# Patient Record
Sex: FEMALE | Race: WHITE | NOT HISPANIC OR LATINO | Employment: FULL TIME | URBAN - METROPOLITAN AREA
[De-identification: names, ages, dates, MRNs, and addresses within clinical notes are randomized per-mention and may not be internally consistent; named-entity substitution may affect disease eponyms.]

---

## 2020-05-27 RX ORDER — ASPIRIN 81 MG/1
81 TABLET ORAL DAILY
COMMUNITY
End: 2021-01-22 | Stop reason: HOSPADM

## 2020-05-27 RX ORDER — CARBAMAZEPINE 200 MG/1
200 TABLET, EXTENDED RELEASE ORAL DAILY
COMMUNITY
End: 2020-12-22

## 2020-05-27 RX ORDER — OMEPRAZOLE 20 MG/1
20 CAPSULE, DELAYED RELEASE ORAL 2 TIMES DAILY
COMMUNITY
End: 2021-01-22 | Stop reason: HOSPADM

## 2020-05-27 RX ORDER — ATORVASTATIN CALCIUM 40 MG/1
40 TABLET, FILM COATED ORAL DAILY
COMMUNITY

## 2020-05-27 RX ORDER — METOPROLOL TARTRATE 100 MG/1
100 TABLET ORAL 2 TIMES DAILY
COMMUNITY

## 2020-05-27 RX ORDER — ZOLPIDEM TARTRATE 5 MG/1
5 TABLET ORAL NIGHTLY PRN
COMMUNITY

## 2020-05-27 RX ORDER — CLOPIDOGREL BISULFATE 75 MG/1
75 TABLET ORAL DAILY
COMMUNITY
End: 2021-01-22 | Stop reason: HOSPADM

## 2020-05-27 RX ORDER — HYDROCODONE BITARTRATE AND ACETAMINOPHEN 7.5; 325 MG/1; MG/1
1 TABLET ORAL EVERY 6 HOURS PRN
COMMUNITY
End: 2021-01-22 | Stop reason: HOSPADM

## 2020-05-27 RX ORDER — ISOSORBIDE MONONITRATE 60 MG/1
60 TABLET, EXTENDED RELEASE ORAL DAILY
COMMUNITY
End: 2022-11-08

## 2020-05-27 RX ORDER — AMITRIPTYLINE HYDROCHLORIDE 10 MG/1
10 TABLET, FILM COATED ORAL NIGHTLY
COMMUNITY
End: 2020-06-02

## 2020-05-27 RX ORDER — FLUOXETINE HYDROCHLORIDE 40 MG/1
40 CAPSULE ORAL 2 TIMES DAILY
COMMUNITY

## 2020-05-27 RX ORDER — ROPINIROLE 2 MG/1
2 TABLET, FILM COATED ORAL NIGHTLY
COMMUNITY

## 2020-06-02 ENCOUNTER — OFFICE VISIT (OUTPATIENT)
Dept: BARIATRICS/WEIGHT MGMT | Facility: CLINIC | Age: 55
End: 2020-06-02

## 2020-06-02 VITALS
RESPIRATION RATE: 18 BRPM | DIASTOLIC BLOOD PRESSURE: 64 MMHG | WEIGHT: 220.5 LBS | TEMPERATURE: 97.8 F | OXYGEN SATURATION: 99 % | BODY MASS INDEX: 35.44 KG/M2 | HEIGHT: 66 IN | SYSTOLIC BLOOD PRESSURE: 108 MMHG | HEART RATE: 78 BPM

## 2020-06-02 DIAGNOSIS — Z87.891 FORMER SMOKER: ICD-10-CM

## 2020-06-02 DIAGNOSIS — E66.01 MORBID OBESITY (HCC): ICD-10-CM

## 2020-06-02 DIAGNOSIS — R10.13 DYSPEPSIA: ICD-10-CM

## 2020-06-02 DIAGNOSIS — K21.9 GASTROESOPHAGEAL REFLUX DISEASE, ESOPHAGITIS PRESENCE NOT SPECIFIED: ICD-10-CM

## 2020-06-02 DIAGNOSIS — E78.5 HYPERLIPIDEMIA, UNSPECIFIED HYPERLIPIDEMIA TYPE: ICD-10-CM

## 2020-06-02 DIAGNOSIS — I10 HYPERTENSION, UNSPECIFIED TYPE: Primary | ICD-10-CM

## 2020-06-02 DIAGNOSIS — E66.9 DIABETES MELLITUS TYPE 2 IN OBESE (HCC): ICD-10-CM

## 2020-06-02 DIAGNOSIS — E11.69 DIABETES MELLITUS TYPE 2 IN OBESE (HCC): ICD-10-CM

## 2020-06-02 DIAGNOSIS — R53.83 FATIGUE, UNSPECIFIED TYPE: ICD-10-CM

## 2020-06-02 PROCEDURE — 99204 OFFICE O/P NEW MOD 45 MIN: CPT | Performed by: PHYSICIAN ASSISTANT

## 2020-06-02 RX ORDER — LISINOPRIL 5 MG/1
5 TABLET ORAL DAILY
COMMUNITY

## 2020-06-02 RX ORDER — LORATADINE 10 MG/1
10 TABLET ORAL DAILY PRN
COMMUNITY

## 2020-06-02 RX ORDER — NITROGLYCERIN 0.4 MG/1
0.4 TABLET SUBLINGUAL
COMMUNITY

## 2020-06-02 RX ORDER — MONTELUKAST SODIUM 10 MG/1
10 TABLET ORAL NIGHTLY PRN
COMMUNITY

## 2020-06-02 NOTE — PROGRESS NOTES
[facility] BARIATRIC SURGERY  [address]  [address]  [facility] [id number] [phone]      Patient  Name:  Alee Wu  :  1965      Date of Visit: 2020      Chief Complaint:  weight gain; unable to maintain weight loss    History of Present Illness:  Alee Wu is a 54 y.o. female who presents today for evaluation, education and consultation regarding metabolic and bariatric surgery. The patient is interested in sleeve gastrectomy with Dr. Mcnamara.  Referred by Dr. Fernández.      Alee has been overweight for at least 30 years.  Previous diet attempts include: Low Carbohydrate, Low Fat and Cabbage Soup; Weight Watchers.  The most weight Alee lost was 13 pounds on a low fat diet but was unable to maintain that weight loss.  Her maximum lifetime weight is 237 pounds.    As above, patient has been overweight for many years, with numerous unsuccessful dietary/weight loss attempts.  She now has obesity related comorbidities and as such has decided to pursue metabolic and bariatric surgery.  All past medical, surgical, social and family history have been obtained and discussed today, as pertinent to bariatric surgery.     Past Medical History:   Diagnosis Date   • Aspirin long-term use    • CAD (coronary artery disease)     s/p stenting , on ASA + Plavix, follows w/ Dr. Walters   • Chronic back pain     on Norco daily, denies NSAIDS/steroids   • Chronic narcotic use     managed by PCP   • Depression    • Diabetes (CMS/[facility])     dx , never on insulin, A1C unknown   • Dyspepsia    • Dyspnea on exertion    • Edema    • Fatigue    • Fatty liver     follows w/ Dr. Fernández in [place]   • Former smoker     quit    • GERD (gastroesophageal reflux disease)     controlled on Omeprazole BID, recent EGD 3/2020 w/ Dr. Fernández   • Hyperlipidemia    • Hypertension    • Insomnia    • Joint pain     rheumatology eval (P)   • MI (myocardial infarction) (CMS/[facility])    • Morbid obesity  (CMS/[facility])    • Neuropathy     on Amitriptyline + Carbamazapine   • RLS (restless legs syndrome)    • Sleep apnea     CPAP compliant     Past Surgical History:   Procedure Laterality Date   •  SECTION     •  SECTION     • COLONOSCOPY     • CORONARY ANGIOPLASTY WITH STENT PLACEMENT     • CORONARY ARTERY BYPASS GRAFT     • ENDOMETRIAL ABLATION     • ENDOSCOPY      w/ Dr. Fernández   • KNEE SURGERY Right 2015   • LAPAROSCOPIC CHOLECYSTECTOMY  2016    dysfunction, no stones       No Known Allergies    Current Outpatient Medications:   •  aspirin 81 MG EC tablet, Take 81 mg by mouth Daily., Disp: , Rfl:   •  atorvastatin (LIPITOR) 40 MG tablet, Take 40 mg by mouth Daily., Disp: , Rfl:   •  carBAMazepine XR (TEGretol  XR) 200 MG 12 hr tablet, Take 200 mg by mouth Daily., Disp: , Rfl:   •  clopidogrel (PLAVIX) 75 MG tablet, Take 75 mg by mouth Daily., Disp: , Rfl:   •  FLUoxetine (PROzac) 40 MG capsule, Take 40 mg by mouth 2 (Two) Times a Day., Disp: , Rfl:   •  HYDROcodone-acetaminophen (NORCO) 7.5-325 MG per tablet, Take 1 tablet by mouth Every 6 (Six) Hours As Needed for Moderate Pain ., Disp: , Rfl:   •  isosorbide mononitrate (IMDUR) 60 MG 24 hr tablet, Take 60 mg by mouth Daily. For chest pain., Disp: , Rfl:   •  lisinopril (PRINIVIL,ZESTRIL) 5 MG tablet, Take 5 mg by mouth Daily., Disp: , Rfl:   •  loratadine (CLARITIN) 10 MG tablet, Take 10 mg by mouth Daily., Disp: , Rfl:   •  metFORMIN (GLUCOPHAGE) 1000 MG tablet, Take 1,000 mg by mouth 2 (Two) Times a Day With Meals., Disp: , Rfl:   •  metoprolol tartrate (LOPRESSOR) 100 MG tablet, Take 100 mg by mouth 2 (Two) Times a Day., Disp: , Rfl:   •  montelukast (SINGULAIR) 10 MG tablet, Take 10 mg by mouth Every Night., Disp: , Rfl:   •  omeprazole (priLOSEC) 20 MG capsule, Take 20 mg by mouth 2 (Two) Times a Day., Disp: , Rfl:   •  rOPINIRole (REQUIP) 2 MG tablet, Take 2 mg by mouth Every Night. Restless leg., Disp: , Rfl:   •   zolpidem (AMBIEN) 5 MG tablet, Take 5 mg by mouth At Night As Needed for Sleep., Disp: , Rfl:   •  nitroglycerin (NITROSTAT) 0.4 MG SL tablet, Place 0.4 mg under the tongue Every 5 (Five) Minutes As Needed for Chest Pain. Take no more than 3 doses in 15 minutes., Disp: , Rfl:     Social History     Socioeconomic History   • Marital status:      Spouse name: Not on file   • Number of children: Not on file   • Years of education: Not on file   • Highest education level: Not on file   Tobacco Use   • Smoking status: Former Smoker     Packs/day: 2.50     Years: 30.00     Pack years: 75.00     Last attempt to quit: 2018     Years since quittin.4   • Smokeless tobacco: Never Used   Substance and Sexual Activity   • Alcohol use: Never     Frequency: Never   • Drug use: Never   Social History Narrative    Lives in [place], [place].   w/ 2 children (34yo and 21yo).  Unemployed.     Family History   Problem Relation Age of Onset   • Obesity Mother    • Hypertension Mother    • Heart disease Mother    • Hypertension Father    • Heart disease Father    • Heart attack Father    • Obesity Father    • Obesity Sister    • Diabetes Sister    • Hypertension Sister    • COPD Maternal Grandmother    • Obesity Maternal Grandmother    • Hypertension Maternal Grandmother    • Stroke Maternal Grandmother    • Cirrhosis Maternal Grandfather    • Hypertension Paternal Grandmother    • Obesity Paternal Grandmother    • Cancer Paternal Grandfather        Review of Systems:  Constitutional:  reports fatigue, weight gain and denies fevers, chills.  HEENT:  denies headache, ear pain or loss of hearing, blurred or double vision, nasal discharge or sore throat.  Cardiovascular:  reports HTN, HLD, CAD, hx heart disease, hx MI and denies chest pain, palpitations, hx DVT.  Respiratory:  reports dyspnea on exertion, sleep apnea and denies cough , wheezing, asthma, hx PE.  Gastrointestinal:  reports heartburn, liver disease and  denies dysphagia, nausea, vomiting, abdominal pain, IBS.  Genitourinary:  denies history of  frequent UTI, incontinence, hematuria, dysuria, polyuria, polydipsia, renal insufficiency.    Musculoskeletal:  reports joint pain, back pain, arthritis and denies fibromyalgia and autoimmune disease.  Neurological:  denies migraines, numbness /tingling, dizziness, confusion, seizure.  Psychiatric:  denies depressed mood, feeling anxious, bipolar disorder.  Endocrine:  reports diabetes and denies thyroid disease.  Hematologic:  denies bruising, bleeding disorder, hx anemia.  Skin:  denies rashes, hx MRSA.    Physical Exam:  Vital Signs:  Weight: 100 kg (220 lb 8 oz)   Body mass index is 36.14 kg/m².  Temp: 97.8 °F (36.6 °C)   Heart Rate: 78   BP: 108/64     Physical Exam   Constitutional: She is oriented to person, place, and time. She appears well-developed and well-nourished. She is cooperative.   HENT:   Head: Normocephalic and atraumatic.   wearing a mask   Eyes: Conjunctivae are normal. No scleral icterus.   Neck: Neck supple. No thyromegaly present.   Cardiovascular: Normal rate and regular rhythm.   No murmur heard.  Pulmonary/Chest: Effort normal and breath sounds normal. No respiratory distress. She has no wheezes. She has no rales.   Abdominal: Soft. Bowel sounds are normal. She exhibits no distension and no mass. There is no tenderness. No hernia.   scars: lap jacklyn + lower transverse   Musculoskeletal: Normal range of motion. She exhibits no edema.   Neurological: She is alert and oriented to person, place, and time. Gait normal.   Skin: Skin is warm and dry. No rash noted.   Psychiatric: She has a normal mood and affect. Judgment normal.   Vitals reviewed.        Assessment:    Alee Wu is a 54 y.o. female with medically complicated obesity pursuing sleeve gastrectomy.    Patient Active Problem List   Diagnosis   • Fatigue   • CAD (coronary artery disease)   • MI (myocardial infarction) (CMS/HCC)   •  Hypertension   • Hyperlipidemia   • Edema   • Sleep apnea   • Diabetes (CMS/HCC)   • Fatty liver   • Joint pain   • RLS (restless legs syndrome)   • Former smoker   • Depression   • Aspirin long-term use   • GERD (gastroesophageal reflux disease)   • Chronic narcotic use   • Insomnia   • Neuropathy   • Chronic back pain   • Dyspepsia   • Dyspnea on exertion   • Morbid obesity (CMS/HCC)       Metabolic and bariatric surgery is deemed medically necessary given the following obesity related comorbidities including sleep apnea, diabetes, hypertension, dyslipidemia, cardiovascular disease, back pain, GERD and fatty liver with current Weight: 100 kg (220 lb 8 oz) and Body mass index is 36.14 kg/m².    Plan:  Patient understands that bariatric surgery is not cosmetic surgery but rather a tool to help make a lifelong commitment to lifestyle changes including diet, exercise, behavior modifications, and healthy habits.  The patient has been educated today on those expected postoperative lifestyle changes.  Psychological and Nutritional consultations will be arranged prior to surgery.  Instructions on how to access [employer] (an internet based site w/ educational surgical videos) were given to the patient.  Recommended vitamin supplementation was reviewed.  The importance of avoiding ASA/ NSAIDS/ steroids/ tobacco/ hormones/ immunomodulators perioperatively was discussed in detail.  All questions/concerns have been addressed.      Further evaluation will include: CBC, CMP, Lipids, TSH, HgA1C, H.Pylori Stool, EKG, CXR, Pulmonary Function Testing, Gastric Emptying Study and EGD.    Will request GI record from Dr. Fernández for review.      Additional clearances needed prior to surgery will include: Cardiology.       Further input to follow pending the above.           JANUARY Blankenship

## 2020-06-14 ENCOUNTER — APPOINTMENT (OUTPATIENT)
Dept: PREADMISSION TESTING | Facility: HOSPITAL | Age: 55
End: 2020-06-14

## 2020-06-14 PROCEDURE — U0002 COVID-19 LAB TEST NON-CDC: HCPCS

## 2020-06-14 PROCEDURE — U0004 COV-19 TEST NON-CDC HGH THRU: HCPCS

## 2020-06-14 PROCEDURE — C9803 HOPD COVID-19 SPEC COLLECT: HCPCS

## 2020-06-15 LAB
REF LAB TEST METHOD: NORMAL
SARS-COV-2 RNA RESP QL NAA+PROBE: NOT DETECTED

## 2020-06-17 ENCOUNTER — HOSPITAL ENCOUNTER (OUTPATIENT)
Dept: PULMONOLOGY | Facility: HOSPITAL | Age: 55
Discharge: HOME | End: 2020-06-17
Admitting: PHYSICIAN ASSISTANT

## 2020-06-17 DIAGNOSIS — Z87.891 FORMER SMOKER: ICD-10-CM

## 2020-06-17 PROCEDURE — 94729 DIFFUSING CAPACITY: CPT | Performed by: INTERNAL MEDICINE

## 2020-06-17 PROCEDURE — 94729 DIFFUSING CAPACITY: CPT

## 2020-06-17 PROCEDURE — 94010 BREATHING CAPACITY TEST: CPT | Performed by: INTERNAL MEDICINE

## 2020-06-17 PROCEDURE — 94727 GAS DIL/WSHOT DETER LNG VOL: CPT

## 2020-06-17 PROCEDURE — 94010 BREATHING CAPACITY TEST: CPT

## 2020-06-17 PROCEDURE — 94727 GAS DIL/WSHOT DETER LNG VOL: CPT | Performed by: INTERNAL MEDICINE

## 2020-08-02 ENCOUNTER — RESULTS ENCOUNTER (OUTPATIENT)
Dept: BARIATRICS/WEIGHT MGMT | Facility: CLINIC | Age: 55
End: 2020-08-02

## 2020-08-02 DIAGNOSIS — E78.5 HYPERLIPIDEMIA, UNSPECIFIED HYPERLIPIDEMIA TYPE: ICD-10-CM

## 2020-08-02 DIAGNOSIS — E11.69 DIABETES MELLITUS TYPE 2 IN OBESE (HCC): ICD-10-CM

## 2020-08-02 DIAGNOSIS — E66.9 DIABETES MELLITUS TYPE 2 IN OBESE (HCC): ICD-10-CM

## 2020-08-02 DIAGNOSIS — K21.9 GASTROESOPHAGEAL REFLUX DISEASE, ESOPHAGITIS PRESENCE NOT SPECIFIED: ICD-10-CM

## 2020-08-02 DIAGNOSIS — I10 HYPERTENSION, UNSPECIFIED TYPE: ICD-10-CM

## 2020-08-02 DIAGNOSIS — R10.13 DYSPEPSIA: ICD-10-CM

## 2020-08-02 DIAGNOSIS — R53.83 FATIGUE, UNSPECIFIED TYPE: ICD-10-CM

## 2020-08-10 ENCOUNTER — TELEPHONE (OUTPATIENT)
Dept: BARIATRICS/WEIGHT MGMT | Facility: CLINIC | Age: 55
End: 2020-08-10

## 2020-08-10 DIAGNOSIS — K76.0 FATTY LIVER: Primary | ICD-10-CM

## 2020-08-10 NOTE — TELEPHONE ENCOUNTER
Called and spoke to patient. Informed of new lab orders as ordered by provider. Pt voiced understanding. Stated she also wanted provider to be aware that she is still waiting on appt for cardiac clearance with [facility]. Stated they have had a covid outbreak and have been closed and keep rescheduling her appt.

## 2020-09-10 ENCOUNTER — LAB (OUTPATIENT)
Dept: LAB | Facility: HOSPITAL | Age: 55
End: 2020-09-10

## 2020-09-10 DIAGNOSIS — K76.0 FATTY LIVER: ICD-10-CM

## 2020-09-10 LAB
INR PPP: 1.14 (ref 0.85–1.16)
PROTHROMBIN TIME: 14.3 SECONDS (ref 11.5–14)

## 2020-09-10 PROCEDURE — 80053 COMPREHEN METABOLIC PANEL: CPT

## 2020-09-10 PROCEDURE — 85610 PROTHROMBIN TIME: CPT

## 2020-09-10 PROCEDURE — 36415 COLL VENOUS BLD VENIPUNCTURE: CPT

## 2020-09-11 LAB
ALBUMIN SERPL-MCNC: 4.1 G/DL (ref 3.5–5.2)
ALBUMIN/GLOB SERPL: 1 G/DL
ALP SERPL-CCNC: 133 U/L (ref 39–117)
ALT SERPL W P-5'-P-CCNC: 37 U/L (ref 1–33)
ANION GAP SERPL CALCULATED.3IONS-SCNC: 15.5 MMOL/L (ref 5–15)
AST SERPL-CCNC: 53 U/L (ref 1–32)
BILIRUB SERPL-MCNC: 0.5 MG/DL (ref 0–1.2)
BUN SERPL-MCNC: 10 MG/DL (ref 6–20)
BUN/CREAT SERPL: 13 (ref 7–25)
CALCIUM SPEC-SCNC: 9.4 MG/DL (ref 8.6–10.5)
CHLORIDE SERPL-SCNC: 100 MMOL/L (ref 98–107)
CO2 SERPL-SCNC: 20.5 MMOL/L (ref 22–29)
CREAT SERPL-MCNC: 0.77 MG/DL (ref 0.57–1)
GFR SERPL CREATININE-BSD FRML MDRD: 78 ML/MIN/1.73
GLOBULIN UR ELPH-MCNC: 4 GM/DL
GLUCOSE SERPL-MCNC: 332 MG/DL (ref 65–99)
POTASSIUM SERPL-SCNC: 3.8 MMOL/L (ref 3.5–5.2)
PROT SERPL-MCNC: 8.1 G/DL (ref 6–8.5)
SODIUM SERPL-SCNC: 136 MMOL/L (ref 136–145)

## 2020-10-02 LAB — TSH SERPL DL<=0.005 MIU/L-ACNC: 2.29 UIU/ML (ref 0.45–4.5)

## 2020-10-29 ENCOUNTER — DOCUMENTATION (OUTPATIENT)
Dept: BARIATRICS/WEIGHT MGMT | Facility: CLINIC | Age: 55
End: 2020-10-29

## 2020-10-29 NOTE — PROGRESS NOTES
"Metabolic and Bariatric Surgery  Presurgical Nutrition Assessment     Alee Wu  10/29/2020  [id number]  [id number]  1965  female    Surgery desired: Sleeve Gastrectomy   Ht 166.4 cm (65.5\")   Wt 100 kg (220 lb 8 oz)    Past Medical History:   Diagnosis Date   • Aspirin long-term use    • CAD (coronary artery disease)     s/p stenting , on ASA + Plavix, follows w/ Dr. Walters   • Chronic back pain     on Norco daily, denies NSAIDS/steroids   • Chronic narcotic use     managed by PCP   • Depression    • Diabetes (CMS/HCC)     dx , never on insulin, A1C unknown   • Dyspepsia    • Dyspnea on exertion    • Edema    • Fatigue    • Fatty liver     follows w/ Dr. Fernández in [place]   • Former smoker     quit    • GERD (gastroesophageal reflux disease)     controlled on Omeprazole BID, recent EGD 3/2020 w/ Dr. Fernández   • Hyperlipidemia    • Hypertension    • Insomnia    • Joint pain     rheumatology eval (P)   • MI (myocardial infarction) (CMS/HCC)    • Morbid obesity (CMS/HCC)    • Neuropathy     on Amitriptyline + Carbamazapine   • RLS (restless legs syndrome)    • Sleep apnea     CPAP compliant     Past Surgical History:   Procedure Laterality Date   •  SECTION     •  SECTION     • COLONOSCOPY     • CORONARY ANGIOPLASTY WITH STENT PLACEMENT     • CORONARY ARTERY BYPASS GRAFT     • ENDOMETRIAL ABLATION     • ENDOSCOPY      w/ Dr. Fernández   • KNEE SURGERY Right 2015   • LAPAROSCOPIC CHOLECYSTECTOMY  2016    dysfunction, no stones     No Known Allergies    Current Outpatient Medications:   •  aspirin 81 MG EC tablet, Take 81 mg by mouth Daily., Disp: , Rfl:   •  atorvastatin (LIPITOR) 40 MG tablet, Take 40 mg by mouth Daily., Disp: , Rfl:   •  carBAMazepine XR (TEGretol  XR) 200 MG 12 hr tablet, Take 200 mg by mouth Daily., Disp: , Rfl:   •  clopidogrel (PLAVIX) 75 MG tablet, Take 75 mg by mouth Daily., Disp: , Rfl:   •  FLUoxetine (PROzac) 40 MG capsule, Take " 40 mg by mouth 2 (Two) Times a Day., Disp: , Rfl:   •  HYDROcodone-acetaminophen (NORCO) 7.5-325 MG per tablet, Take 1 tablet by mouth Every 6 (Six) Hours As Needed for Moderate Pain ., Disp: , Rfl:   •  isosorbide mononitrate (IMDUR) 60 MG 24 hr tablet, Take 60 mg by mouth Daily. For chest pain., Disp: , Rfl:   •  lisinopril (PRINIVIL,ZESTRIL) 5 MG tablet, Take 5 mg by mouth Daily., Disp: , Rfl:   •  loratadine (CLARITIN) 10 MG tablet, Take 10 mg by mouth Daily., Disp: , Rfl:   •  metFORMIN (GLUCOPHAGE) 1000 MG tablet, Take 1,000 mg by mouth 2 (Two) Times a Day With Meals., Disp: , Rfl:   •  metoprolol tartrate (LOPRESSOR) 100 MG tablet, Take 100 mg by mouth 2 (Two) Times a Day., Disp: , Rfl:   •  montelukast (SINGULAIR) 10 MG tablet, Take 10 mg by mouth Every Night., Disp: , Rfl:   •  nitroglycerin (NITROSTAT) 0.4 MG SL tablet, Place 0.4 mg under the tongue Every 5 (Five) Minutes As Needed for Chest Pain. Take no more than 3 doses in 15 minutes., Disp: , Rfl:   •  omeprazole (priLOSEC) 20 MG capsule, Take 20 mg by mouth 2 (Two) Times a Day., Disp: , Rfl:   •  rOPINIRole (REQUIP) 2 MG tablet, Take 2 mg by mouth Every Night. Restless leg., Disp: , Rfl:   •  zolpidem (AMBIEN) 5 MG tablet, Take 5 mg by mouth At Night As Needed for Sleep., Disp: , Rfl:       Nutrition Assessment    Estimated energy needs: 1900    Estimated calories for weight loss: 1500    IBW (Pounds):  150      Excess body weight (Pounds): 70       Nutrition Recall  24 Hour recall: (B) (L) (D) -  Reviewed and discussed with patient  Breakfast:  Diet coke, chocolate covered donuts  Snack:  Caramel candy  Lunch:  Salad, pizza  Dinner:  Fish, armani slaw, corn  Diet coke, tea, coffee     Exercise  never      Education    Provided manual for Sleeve Gastrectomy and reviewed necessary vitamin and protein supplementation for surgery.     Provided follow-up options for support, including contact information for dietitians here, if desired.   Recommend that  team follow per protocol.      Nutrition Goals   Dietary Guidelines per manual  Protein goal:  grams per day   Eliminate soda    Exercise Goals  Add 15-30 minutes of activity per day as tolerated        Myriam Veliz RD  10/29/2020  12:52 EDT

## 2020-12-02 ENCOUNTER — OFFICE VISIT (OUTPATIENT)
Dept: PSYCHIATRY | Facility: CLINIC | Age: 55
End: 2020-12-02

## 2020-12-02 DIAGNOSIS — F41.1 GENERALIZED ANXIETY DISORDER: ICD-10-CM

## 2020-12-02 DIAGNOSIS — E66.9 OBESITY (BMI 30-39.9): Primary | ICD-10-CM

## 2020-12-02 DIAGNOSIS — F43.9 FEELING STRESSED OUT: ICD-10-CM

## 2020-12-02 DIAGNOSIS — Z71.89 ENCOUNTER FOR PSYCHOLOGICAL ASSESSMENT PRIOR TO BARIATRIC SURGERY: ICD-10-CM

## 2020-12-02 DIAGNOSIS — Z86.59 H/O: DEPRESSION: ICD-10-CM

## 2020-12-02 PROCEDURE — 90791 PSYCH DIAGNOSTIC EVALUATION: CPT | Performed by: PSYCHOLOGIST

## 2020-12-02 NOTE — PROGRESS NOTES
PROGRESS NOTE    Data:  Alee Wu is a 55 y.o. female who met with the undersigned for a scheduled individual outpatient therapy session from 8:12 - 8:49am.      Clinical Maneuvering/Intervention:      The pt talked about struggling with obesity for several years. Despite trying different weight loss plans and diets, the pt reported being unsuccessful in losing weight. A psychological evaluation was conducted in order to assess past and current level of functioning. Areas assessed included, but were not limited to: perception of social support, perception of ability to face and deal with challenges in life (positive functioning), anxiety symptoms, depressive symptoms, perspective on beliefs/belief system, coping skills for stress, intelligence level, addiction issues, etc. Therapeutic rapport was established. Interventions conducted today were geared towards assessing the pt's readiness for weight loss surgery and identifying and psychological contraindications for undergoing such a major life change. Social support was deemed strong (specific to weight loss surgery/weight loss in this manner and in a general sense): children, friends, physicians. Current psychological struggles were deemed low, but included anxiety. She expressed feeling more excited than nervous about weight loss surgery, but did admit to worrying about things often (primarily health). Coping skills for distress and related to undergoing a major life change such as weight loss surgery/weight loss were deemed strong and included being a very driven person, intelligent, knowing what she wants in life, and being a strong leader. She also has good insight into herself and has strong flako in God. A discussion took place, however, about areas to grow such as practicing turning more worry/control over to God and more often. She was reminded of rule she needs to follow in order to be successful with weight loss surgery such as wearing a mask at  medical visits/being with her medical team. Practicing the mindset of turning worry/control over to her medical team for weight loss surgery was conducted. The pt endorsed having characteristics of readiness to undergo major life changes inherent in the journey of weight loss surgery.        Mental Status Exam  Hygiene:  good  Dress: normal  Attitude:  cooperative and proactive  Motor Activity: normal  Speech: pressured  Mood:  tense  Affect:  congruent  Thought Processes: normal  Thought Content:  normal  Suicidal Thoughts:  not endorsed  Homicidal Thoughts:  not endorsed  Crisis Safety Plan: not needed   Hallucinations:  none      Patient's Support Network Includes:  family, friends      Progress toward goal: there is evidence to suggest that she is taking measures to improve the quality of her life including seeking weight loss surgery.      Functional Status: moderate to high      Prognosis: good    Assessment      The pt presented to be struggling with obesity, stress, and anxiety. She likely has a high energy level at baseline. The pt does have several coping skills for distress and tends to be a very driven and independent person. At the same time, and she seemed open to the feedback, it would benefit her to turn control over to others as the case may be. She seemed open to letting go of some control in life, whether to experts in medicine who treat her or just in flako, as she turns her will over to God.     From a psychological standpoint, the pt presents as a good candidate for bariatric surgery. She is motivated for the surgery, has showed readiness for the lifestyle change in terms of adjusting her eating habits, and seems to have appropriate expectations of how to prepare and how to live after surgery in order to lose weight successfully. Her speech is pressured related to anxiety and being a high energy person. She responds well with assertive statements and being interrupted often, even though others  may not handle this so well. She expressed highly disliking wearing a mask, but seemed to accept it that if she does not wear one in on hospital visits, she could be discharged from the practice.       Plan      In order to diminish symptoms of distress (anxiety and stress), prevent relapse of depression, and improve her quality of life, the pt is to follow up with her bariatric surgeon in order to receive weight loss surgery as soon as feasible/appropriate and based on success with compliance to adhering to the proper diet. She will also act on the plan discussed in detail today: purposefully turning worry and control over to God and her medical team as she goes through weight loss surgery.    Radha Long, PhD, LP

## 2020-12-15 DIAGNOSIS — R06.00 DYSPNEA, UNSPECIFIED TYPE: Primary | ICD-10-CM

## 2020-12-15 DIAGNOSIS — R53.83 FATIGUE, UNSPECIFIED TYPE: ICD-10-CM

## 2020-12-18 ENCOUNTER — HOSPITAL ENCOUNTER (OUTPATIENT)
Dept: GENERAL RADIOLOGY | Facility: HOSPITAL | Age: 55
Discharge: HOME | End: 2020-12-18

## 2020-12-18 ENCOUNTER — LAB (OUTPATIENT)
Dept: LAB | Facility: HOSPITAL | Age: 55
End: 2020-12-18

## 2020-12-18 DIAGNOSIS — R06.00 DYSPNEA, UNSPECIFIED TYPE: ICD-10-CM

## 2020-12-18 DIAGNOSIS — R53.83 FATIGUE, UNSPECIFIED TYPE: ICD-10-CM

## 2020-12-18 LAB
ALBUMIN SERPL-MCNC: 4.1 G/DL (ref 3.5–5.2)
ALBUMIN/GLOB SERPL: 1.1 G/DL
ALP SERPL-CCNC: 106 U/L (ref 39–117)
ALT SERPL W P-5'-P-CCNC: 26 U/L (ref 1–33)
ANION GAP SERPL CALCULATED.3IONS-SCNC: 11.4 MMOL/L (ref 5–15)
AST SERPL-CCNC: 36 U/L (ref 1–32)
BILIRUB SERPL-MCNC: 0.7 MG/DL (ref 0–1.2)
BUN SERPL-MCNC: 11 MG/DL (ref 6–20)
BUN/CREAT SERPL: 16.7 (ref 7–25)
CALCIUM SPEC-SCNC: 9.6 MG/DL (ref 8.6–10.5)
CHLORIDE SERPL-SCNC: 99 MMOL/L (ref 98–107)
CO2 SERPL-SCNC: 25.6 MMOL/L (ref 22–29)
CREAT SERPL-MCNC: 0.66 MG/DL (ref 0.57–1)
DEPRECATED RDW RBC AUTO: 37.4 FL (ref 37–54)
ERYTHROCYTE [DISTWIDTH] IN BLOOD BY AUTOMATED COUNT: 13.3 % (ref 12.3–15.4)
GFR SERPL CREATININE-BSD FRML MDRD: 93 ML/MIN/1.73
GLOBULIN UR ELPH-MCNC: 3.8 GM/DL
GLUCOSE SERPL-MCNC: 242 MG/DL (ref 65–99)
HCT VFR BLD AUTO: 36.8 % (ref 34–46.6)
HGB BLD-MCNC: 11.5 G/DL (ref 12–15.9)
MCH RBC QN AUTO: 24.6 PG (ref 26.6–33)
MCHC RBC AUTO-ENTMCNC: 31.3 G/DL (ref 31.5–35.7)
MCV RBC AUTO: 78.6 FL (ref 79–97)
PLATELET # BLD AUTO: 163 10*3/MM3 (ref 140–450)
PMV BLD AUTO: 9 FL (ref 6–12)
POTASSIUM SERPL-SCNC: 4.2 MMOL/L (ref 3.5–5.2)
PROT SERPL-MCNC: 7.9 G/DL (ref 6–8.5)
RBC # BLD AUTO: 4.68 10*6/MM3 (ref 3.77–5.28)
SODIUM SERPL-SCNC: 136 MMOL/L (ref 136–145)
WBC # BLD AUTO: 6.41 10*3/MM3 (ref 3.4–10.8)

## 2020-12-18 PROCEDURE — 36415 COLL VENOUS BLD VENIPUNCTURE: CPT

## 2020-12-18 PROCEDURE — 71046 X-RAY EXAM CHEST 2 VIEWS: CPT

## 2020-12-18 PROCEDURE — 85027 COMPLETE CBC AUTOMATED: CPT

## 2020-12-18 PROCEDURE — 80053 COMPREHEN METABOLIC PANEL: CPT

## 2020-12-22 ENCOUNTER — CONSULT (OUTPATIENT)
Dept: BARIATRICS/WEIGHT MGMT | Facility: CLINIC | Age: 55
End: 2020-12-22

## 2020-12-22 VITALS
SYSTOLIC BLOOD PRESSURE: 118 MMHG | OXYGEN SATURATION: 99 % | WEIGHT: 222.5 LBS | HEART RATE: 78 BPM | BODY MASS INDEX: 35.76 KG/M2 | TEMPERATURE: 97.8 F | HEIGHT: 66 IN | DIASTOLIC BLOOD PRESSURE: 76 MMHG | RESPIRATION RATE: 18 BRPM

## 2020-12-22 DIAGNOSIS — E66.01 MORBID EXOGENOUS OBESITY (HCC): Primary | ICD-10-CM

## 2020-12-22 PROCEDURE — 99214 OFFICE O/P EST MOD 30 MIN: CPT | Performed by: SURGERY

## 2020-12-22 RX ORDER — PANTOPRAZOLE SODIUM 40 MG/10ML
40 INJECTION, POWDER, LYOPHILIZED, FOR SOLUTION INTRAVENOUS ONCE
Status: CANCELLED | OUTPATIENT
Start: 2020-12-22 | End: 2020-12-22

## 2020-12-22 RX ORDER — ACETAMINOPHEN 500 MG
1000 TABLET ORAL ONCE
Status: CANCELLED | OUTPATIENT
Start: 2020-12-22 | End: 2020-12-22

## 2020-12-22 RX ORDER — SODIUM CHLORIDE 9 MG/ML
150 INJECTION, SOLUTION INTRAVENOUS CONTINUOUS
Status: CANCELLED | OUTPATIENT
Start: 2020-12-22

## 2020-12-22 RX ORDER — SODIUM CHLORIDE 0.9 % (FLUSH) 0.9 %
3-10 SYRINGE (ML) INJECTION AS NEEDED
Status: CANCELLED | OUTPATIENT
Start: 2020-12-22

## 2020-12-22 RX ORDER — SODIUM CHLORIDE 0.9 % (FLUSH) 0.9 %
3 SYRINGE (ML) INJECTION EVERY 12 HOURS SCHEDULED
Status: CANCELLED | OUTPATIENT
Start: 2020-12-22

## 2020-12-22 RX ORDER — SCOLOPAMINE TRANSDERMAL SYSTEM 1 MG/1
1 PATCH, EXTENDED RELEASE TRANSDERMAL ONCE
Status: CANCELLED | OUTPATIENT
Start: 2020-12-22 | End: 2020-12-22

## 2020-12-22 RX ORDER — GABAPENTIN 100 MG/1
600 CAPSULE ORAL ONCE
Status: CANCELLED | OUTPATIENT
Start: 2020-12-22 | End: 2020-12-22

## 2020-12-22 RX ORDER — CHLORHEXIDINE GLUCONATE 0.12 MG/ML
30 RINSE ORAL
Status: CANCELLED | OUTPATIENT
Start: 2020-12-22 | End: 2020-12-22

## 2021-01-05 ENCOUNTER — TELEPHONE (OUTPATIENT)
Dept: BARIATRICS/WEIGHT MGMT | Facility: CLINIC | Age: 56
End: 2021-01-05

## 2021-01-06 NOTE — TELEPHONE ENCOUNTER
Patient notified to stop plavix and asa 1 week before surgery, 1/13/2020. Patient verbalized understanding.

## 2021-01-17 NOTE — H&P (VIEW-ONLY)
[facility] GROUP BARIATRIC SURGERY  [address]  [address]  [facility] [address] [phone]      Patient  Name:  Alee Wu  :  1965      Date of Visit: 2020    Chief Complaint:  weight gain; unable to maintain weight loss.   Evaluate for possible metabolic and bariatric surgery    History of Present Illness:  Alee Wu is a 55 y.o. female who presents today for evaluation, education and consultation regarding metabolic and bariatric surgery (MBS).  Since last seen 2020 she has gained 2 pounds.The patient returns for final visit prior to metabolic and bariatric surgery specifically the sleeve gastrectomy.  Original intake evaluation Lorena Pacheco PA-C dated 2020 reviewed.    She noted at that time the patient's maximum lifetime weight was 237 pounds.      The patient has had issues with morbid obesity for years and only temporary success with non-surgical methods of weight loss.  The patient is seeking LSG to help with the morbid obesity related conditions of coronary artery disease with history of MI status CABG and later stenting, chronic back pain on chronic narcotics, depression, poorly controlled type II non-insulin-dependent diabetes mellitus with a hemoglobin A1c of 9.4 (2020), dyspnea exertion, peripheral edema, fatigue, NARANJO with possible cirrhosis and MELD score of 8 and splenic and peritoneal varices, severe tobacco use disorder in early remission, hyperlipidemia, hypertension, insomnia, joint pain, neuropathy, restless leg syndrome, obstructive sleep apnea with noncompliance with CPAP, microcytic anemia, degenerative spine changes on chest x-ray, hepatosplenomegaly, history gastric bezoar removal , COPD.    55-year-old morbidly obese female from [facility] but was spending a lot of time in [facility] near [place] taking care of her mother until she passed away.  She says she became upset after my talk because prior to that no one told  her about the potential contribution of high fructose corn syrup to her NARANJO.  She said she began gaining weight after she quit smoking a couple of years ago.  She says she was never told her A1c was elevated and that insulin has never been recommended.  She says she quit using her CPAP about 6 months ago because the cord broke and she felt she really did not need it.  It is my overall impression that she is not overly concerned with her own health -for example she continued to smoke heavily for years after diagnosed with a heart attack and requiring stents, noncompliance with CPAP, no interest in her diabetes management, extremely poor dietitian evaluation/eating habits, etc.  I discussed this with her and why she would even seek metabolic and bariatric surgery.  She became very tearful and acknowledged my concerns but very much wishes to proceed.  She is aware her surgery will have higher risk of morbidity and mortality given her cirrhosis and varices and nonetheless wishes to proceed.  Further increased risk with her poorly controlled diabetes, coronary artery disease, untreated obstructive sleep apnea, longstanding tobacco history, etc.  She says she is never been told prior to today that she had a heart murmur.  She is aware that if the varices are too large at the time of surgery that I would back out and not be able to safely perform her sleeve gastrectomy.        Past Medical History:   Diagnosis Date   • Aspirin long-term use    • CAD (coronary artery disease)     s/p stenting 2017, on ASA + Plavix, follows w/ Dr. Walters   • Chronic back pain     on Norco daily, denies NSAIDS/steroids   • Chronic narcotic use     managed by PCP   • COPD (chronic obstructive pulmonary disease) (CMS/[facility])    • Depression    • Diabetes (CMS/[facility])     dx 2017, never on insulin, A1C 9.4   • Dyspepsia    • Dyspnea on exertion    • Edema    • Elevated AST (SGOT)    • Fatigue    • Former smoker     quit 2018   • Gastric bezoar      History endoscopic removal Dr. Fernández 2019   • GERD (gastroesophageal reflux disease)     controlled on Omeprazole BID, recent EGD 3/2020 w/ Dr. Fernández   • Hepatosplenomegaly     By CT scan   • History of blood transfusion     With CABG    • Hyperlipidemia    • Hypertension    • Insomnia    • Joint pain     rheumatology eval (P)   • Liver cirrhosis secondary to NARANJO (CMS/HCC)     follows w/ Dr. Fernández in [place].  MELD 8   • MI (myocardial infarction) (CMS/HCC)    • Microcytic anemia    • Morbid obesity (CMS/HCC)    • Neuropathy     on Amitriptyline + Carbamazapine   • RLS (restless legs syndrome)    • Sleep apnea     CPAP noncompliant last 6 months as above   • Tobacco use disorder, severe, in early remission    • Varices of spleen     And peritoneum CT scan 2020     Past Surgical History:   Procedure Laterality Date   •  SECTION     •  SECTION     • COLONOSCOPY     • CORONARY ANGIOPLASTY WITH STENT PLACEMENT     • CORONARY ARTERY BYPASS GRAFT      2 units of packed red blood cells given   • ENDOMETRIAL ABLATION     • ENDOSCOPY      w/ Dr. Fernández   • KNEE SURGERY Right 2015   • LAPAROSCOPIC CHOLECYSTECTOMY  2016    dysfunction, no stones       No Known Allergies    Current Outpatient Medications:   •  aspirin 81 MG EC tablet, Take 81 mg by mouth Daily., Disp: , Rfl:   •  atorvastatin (LIPITOR) 40 MG tablet, Take 40 mg by mouth Daily., Disp: , Rfl:   •  clopidogrel (PLAVIX) 75 MG tablet, Take 75 mg by mouth Daily., Disp: , Rfl:   •  FLUoxetine (PROzac) 40 MG capsule, Take 40 mg by mouth 2 (Two) Times a Day., Disp: , Rfl:   •  HYDROcodone-acetaminophen (NORCO) 7.5-325 MG per tablet, Take 1 tablet by mouth Every 6 (Six) Hours As Needed for Moderate Pain ., Disp: , Rfl:   •  isosorbide mononitrate (IMDUR) 60 MG 24 hr tablet, Take 60 mg by mouth Daily. For chest pain., Disp: , Rfl:   •  lisinopril (PRINIVIL,ZESTRIL) 5 MG tablet, Take 5 mg by mouth Daily., Disp: ,  Rfl:   •  loratadine (CLARITIN) 10 MG tablet, Take 10 mg by mouth Daily., Disp: , Rfl:   •  metFORMIN (GLUCOPHAGE) 1000 MG tablet, Take 1,000 mg by mouth 2 (Two) Times a Day With Meals., Disp: , Rfl:   •  metoprolol tartrate (LOPRESSOR) 100 MG tablet, Take 100 mg by mouth 2 (Two) Times a Day., Disp: , Rfl:   •  montelukast (SINGULAIR) 10 MG tablet, Take 10 mg by mouth Every Night., Disp: , Rfl:   •  nitroglycerin (NITROSTAT) 0.4 MG SL tablet, Place 0.4 mg under the tongue Every 5 (Five) Minutes As Needed for Chest Pain. Take no more than 3 doses in 15 minutes., Disp: , Rfl:   •  omeprazole (priLOSEC) 20 MG capsule, Take 20 mg by mouth 2 (Two) Times a Day., Disp: , Rfl:   •  rOPINIRole (REQUIP) 2 MG tablet, Take 2 mg by mouth Every Night. Restless leg., Disp: , Rfl:   •  zolpidem (AMBIEN) 5 MG tablet, Take 5 mg by mouth At Night As Needed for Sleep., Disp: , Rfl:     Social History     Socioeconomic History   • Marital status:      Spouse name: Not on file   • Number of children: Not on file   • Years of education: Not on file   • Highest education level: Not on file   Tobacco Use   • Smoking status: Former Smoker     Packs/day: 2.50     Years: 30.00     Pack years: 75.00     Quit date: 2018     Years since quitting: 3.0   • Smokeless tobacco: Never Used   Substance and Sexual Activity   • Alcohol use: Never     Frequency: Never   • Drug use: Never   Social History Narrative    Lives in [place], [place].   w/ 2 children (32yo and 21yo).  Unemployed.     Family History   Problem Relation Age of Onset   • Obesity Mother    • Hypertension Mother    • Heart disease Mother    • Hypertension Father    • Heart disease Father    • Heart attack Father    • Obesity Father    • Obesity Sister    • Diabetes Sister    • Hypertension Sister    • COPD Maternal Grandmother    • Obesity Maternal Grandmother    • Hypertension Maternal Grandmother    • Stroke Maternal Grandmother    • Cirrhosis Maternal Grandfather     • Hypertension Paternal Grandmother    • Obesity Paternal Grandmother    • Cancer Paternal Grandfather        Review of Systems   Constitutional: Positive for fatigue and unexpected weight gain. Negative for chills, diaphoresis, fever and unexpected weight loss.   HENT: Negative for congestion and facial swelling.    Eyes: Negative for blurred vision, double vision and discharge.   Respiratory: Negative for chest tightness, shortness of breath and stridor.    Cardiovascular: Negative for chest pain, palpitations and leg swelling.   Gastrointestinal: Positive for GERD. Negative for blood in stool.   Endocrine: Negative for polydipsia.   Genitourinary: Negative for hematuria.   Musculoskeletal: Positive for arthralgias, back pain and myalgias.   Skin: Negative for color change.   Allergic/Immunologic: Negative for immunocompromised state.   Neurological: Negative for confusion.   Psychiatric/Behavioral: Negative for self-injury.       I have reviewed the ROS and confirm that it's accurate today.    Physical Exam:  Vital Signs:  Weight: 101 kg (222 lb 8 oz)   Body mass index is 36.46 kg/m².  Temp: 97.8 °F (36.6 °C)   Heart Rate: 78   BP: 118/76     Physical Exam  Vitals signs reviewed.   Constitutional:       Appearance: She is well-developed.   HENT:      Head: Normocephalic and atraumatic.      Nose:      Comments: mask  Eyes:      Conjunctiva/sclera: Conjunctivae normal.      Pupils: Pupils are equal, round, and reactive to light.   Neck:      Musculoskeletal: Normal range of motion and neck supple.      Thyroid: No thyromegaly.      Vascular: No carotid bruit.      Trachea: No tracheal deviation.   Cardiovascular:      Rate and Rhythm: Normal rate and regular rhythm.      Heart sounds: Murmur present. Systolic murmur present with a grade of 3/6.      Comments: Murmur heard left second intercostal space greater than right  Pulmonary:      Effort: Pulmonary effort is normal. No respiratory distress.      Breath  sounds: Normal breath sounds.   Chest:      Comments: Median sternotomy  Abdominal:      General: There is no distension.      Palpations: Abdomen is soft.      Tenderness: There is no abdominal tenderness.      Comments: Upper laparoscopy scars low transverse scar   Musculoskeletal: Normal range of motion.         General: No deformity.   Skin:     General: Skin is warm and dry.      Findings: No rash.   Neurological:      Mental Status: She is alert and oriented to person, place, and time.      Cranial Nerves: No cranial nerve deficit.      Coordination: Coordination normal.   Psychiatric:         Behavior: Behavior normal.         Thought Content: Thought content normal.         Judgment: Judgment normal.         Patient Active Problem List   Diagnosis   • Fatigue   • CAD (coronary artery disease)   • MI (myocardial infarction) (CMS/HCC)   • Hypertension   • Hyperlipidemia   • Edema   • Sleep apnea   • Diabetes (CMS/HCC)   • Fatty liver   • Joint pain   • RLS (restless legs syndrome)   • Former smoker   • Depression   • Aspirin long-term use   • GERD (gastroesophageal reflux disease)   • Chronic narcotic use   • Insomnia   • Neuropathy   • Chronic back pain   • Dyspepsia   • Dyspnea on exertion   • Morbid obesity (CMS/HCC)   • Morbid exogenous obesity (CMS/HCC)       Assessment:    Alee Wu is a 55 y.o. year old female with medically complicated obesity.    Metabolic and bariatric surgery is deemed medically necessary given the following obesity related comorbidities including coronary artery disease with history of MI status CABG and later stenting, chronic back pain on chronic narcotics, depression, poorly controlled type II non-insulin-dependent diabetes mellitus with a hemoglobin A1c of 9.4, dyspnea exertion, peripheral edema, fatigue, NARANJO with possible cirrhosis and MELD score of 8 and splenic and peritoneal varices, severe tobacco use disorder in early remission, hyperlipidemia, hypertension, insomnia,  joint pain, neuropathy, restless leg syndrome, obstructive sleep apnea with noncompliance with CPAP, microcytic anemia, degenerative spine changes on chest x-ray, hepatosplenomegaly, hx gastric bezoar removal 7/19, COPD with current Weight: 101 kg (222 lb 8 oz) and Body mass index is 36.46 kg/m²..    Encounter Diagnosis   Name Primary?   • Morbid exogenous obesity (CMS/HCC) Yes      Patient is aware that surgery is a tool, and that weight loss and improvement in comorbidities is not guaranteed but only seen in the context of appropriate use, follow up and physical activity.    The patient was present for an approximately a 2.5 hour discussion of the purpose of MBS, how MBS is a tool to assist in achieving weight loss goals, the most common complications and how best to avoid them, and the strategies for short and long term weight loss and improvement in comorbidities.  Ample opportunity to discuss questions was available both in group and during the time of individual examination.    I reviewed her Dane report which is negative despite being on chronic narcotics.  Pulmonary function test dated 6/18/2020 read by Aldair Middleton DO unremarkable.  Labs dated 12/18/2020 showing microcytic anemia with an H&H of 11.5 and 36.8 with microcytic indices normal white count.  Platelet count 163.  CMP normal except for glucose of 242 AST of 36.  3 months ago CMP showed a glucose of 332 CO2 of 20.5 ALT 37 AST 53 alkaline phosphatase 133.  Chest x-ray dated 12/18/2020 showing no active process.  Median sternotomy and CABG.  Degenerative spine changes.  Psychosocial evaluation dated 12/2/2020 Radha Long, PhD good candidate.  Dietitian evaluation dated 10/29/2020 Myriam Veliz RD.  She noted that day the patient had a diet Coke and chocolate covered doughnuts for breakfast, caramel candy snack, pizza for lunch.  EGD dated 3/2/2020  Be Fernández MD showing prominent gastritis and duodenitis, no significant hiatal hernia, no  "varices mentioned.  Biopsies the antrum showed mild chronic gastritis with mild reactive gastropathy duodenal biopsies showed mild chronic duodenitis.  No distal esophageal biopsies obtained.  Preoperative diagnosis was listed as \"abdominal\".  Postop diagnosis listed is gastritis.  Endoscopy Dr. Shirley dated 7/24/2019 where he removed a large gastric bezoar.  At that time he noted normal esophageal mucosa no hiatal hernia Z-line 39 cm mild antral gastritis.  Normal gastric emptying study dated 1/20/2020.  CT scan dated 1/30/2020 showing an enlarged fatty liver, splenomegaly, splenic and peritoneal varices with the peritoneal varices mostly on the left side no ascites.  Negative H. pylori stool antigen dated 6/23/2020.  Pulmonary function test dated 6/17/2020 showing an FVC of 86 FEV1 of 86 DLCO of 80 cardiology clearance dated 9/21/2020 signature illegible.  Cardiology clearance letter dated 9/11/2020 Horacio Miller MD. he noted she has a history of coronary artery bypass grafting surgery, hypertension, COPD and hypercholesterolemia and had a cardiac catheterization showing 2 out of 3 grafts were patent and that the SVG to the OM was occluded.  Also noted was occlusion of the distal LAD being managed medically.  Ejection fraction 60%.  He cleared her at low risk.  Nuclear medicine stress test dated 7/7/2020 showing moderate area of reversible lateral and apical ischemia ejection fraction 63%.  Echocardiogram dated 3/9/2020 showing no abnormalities ejection fraction 65% no valvular abnormalities.  Labs dated 9/10/2020 showing an elevated pro time at 14.3 with an INR normal at 1.14 normal TSH.  Labs dated 9/2/2020 showing an H&H of 11.4 and 35.2 low platelet count 122.  Labs dated 6/23/2020 showing a low platelet count 124 hemoglobin A1c 9.4.  Lipid panel showing triglycerides 315 cholesterol 237  VLDL 63 HDL 36 all abnormal.  Please see scanned records that I have reviewed and signed off on today.  All of " "this in addition to the patient's unique history and exam has been taken into consideration in determining their appropriate candidacy for MBS.    Complications  of laparoscopic/possible robotic gastric sleeve were discussed. The patient is well aware of the potential complications of surgery that include but not limited to bleeding, infections, deep venous thrombosis, pulmonary embolism, pulmonary complications such as pneumonia, cardiac events, hernias, small bowel obstruction, damage to the spleen or other organs, bowel injury, disfiguring scars, failure to lose weight, need for additional surgery, conversion to an open procedure, and death. Patient is also aware of complications which apply in this particular procedure that can include but are not limited to a \"leak\" at the staple line which in some instances may require conversion to gastric bypass.    The patient is aware if a hiatal hernia is encountered, it likely will be repaired.  R/B/A Rx to hiatal hernia repair were discussed as outlined in our long consent form.  Briefly risks in addition to those for LSG include recurrent hernia, MELODY, dysphagia, esophageal injury, pneumothorax, injury to the vagus nerves, injury to the thoracic duct, aorta or vena cava.    I discussed avoiding all tobacco products and second hand smoke at least 2 weeks pre-operatively and 6 weeks post-operatively to minimize the risk of sleeve leak.  This included discussing the importance of avoiding even secondhand smoke as the risk of leak is increased.  Examples discussed:  I made it very clear that the patient understands they should avoid even riding in a car where someone has previously smoked in the last 2 weeks, living in a house where someone smokes (even if it's in a separate room/patio/attached garage, etc.) we discussed that they should not have a conversation with a group of people who are smoking even if it's outside.  They can be around wood burning fires and barbecue.  " I told them I do not know if marijuana has a same effects but my overall recommendation is to avoid it for 2 weeks prior in 6 weeks after surgery.  They also are aware that nicotine may also increase the risk of leak and I strongly encouraged him to avoid that as well for 2 weeks prior in 6 weeks after surgery.    Discussed the risks, benefits and alternative therapies at great length as outlined in our extensive consent forms, consent videos, and educational teaching process under the direction of the center's .    A copy of the patient's signed informed consent is on file.    R/B/A Rx discussed to postop anticoagulation incl but not limited to bleeding, drug reaction, venothromboembolic events, etc. and the patient will continue on her Plavix and hold her aspirin 1 week prior in 1 week  (not 2 or the preferred 6 weeks) after surgery although she understands that this could increase her risk of delayed leak.      I explained that my usual practice with cardiac stent patients has been to hold the ASA and/or plavix 1 wk prior and instead of the 6 wks afterwards (safest to hopefully avoid a leak), resume at 2 wks post op with PPI coverage.  So far, this has not resulted in any leaks to date, but the sample size is relatively small.  The idea is to balance the risk of stent occlusion with the risk of leak and there's no perfect solution and there will be an increased risk of delayed leak.        Plan: After evaluation today I think the patient is a much higher but not prohibitive risk to proceed with laparoscopic sleeve gastrectomy and EGD.  Once again if her varices are too large I will abort as it cannot be performed safely.  Type and screen.  Increased risk of delayed leak on aspirin as above.  Other issues include coronary artery disease with history of MI status CABG and later stenting with occluded saphenous vein graft to her obtuse marginal and distal occlusion of her LAD, chronic back pain on  chronic narcotics, depression, poorly controlled type II non-insulin-dependent diabetes mellitus with a hemoglobin A1c of 9.4, dyspnea exertion, peripheral edema, fatigue, NARANJO cirrhosis and MELD score of 8 and splenic and peritoneal varices, severe tobacco use disorder in early remission, hyperlipidemia, hypertension, insomnia, joint pain, neuropathy, restless leg syndrome, obstructive sleep apnea with noncompliance with CPAP, microcytic anemia with increase risk for perioperative transfusion, degenerative spine changes on chest x-ray, hepatosplenomegaly, hx gastric bezoar removal 7/19, COPD, poor dietitian evaluation, heart murmur, intermittent thrombocytopenia.      Raymundo Mcnamara MD              Answers for HPI/ROS submitted by the patient on 12/16/2020   What is the primary reason for your visit?: Other  Please describe your symptoms.: Consultation for gastric sleeve surgery  Have you had these symptoms before?: No  How long have you been having these symptoms?: Greater than 2 weeks  Please list any medications you are currently taking for this condition.: See attached  Please describe any probable cause for these symptoms. : See attached

## 2021-01-17 NOTE — PROGRESS NOTES
[facility] GROUP BARIATRIC SURGERY  [address]  [address]  [facility] [id number] [phone]      Patient  Name:  Alee Wu  :  1965      Date of Visit: 2020    Chief Complaint:  weight gain; unable to maintain weight loss.   Evaluate for possible metabolic and bariatric surgery    History of Present Illness:  Alee Wu is a 55 y.o. female who presents today for evaluation, education and consultation regarding metabolic and bariatric surgery (MBS).  Since last seen 2020 she has gained 2 pounds.The patient returns for final visit prior to metabolic and bariatric surgery specifically the sleeve gastrectomy.  Original intake evaluation Lorena Pacheco PA-C dated 2020 reviewed.    She noted at that time the patient's maximum lifetime weight was 237 pounds.      The patient has had issues with morbid obesity for years and only temporary success with non-surgical methods of weight loss.  The patient is seeking LSG to help with the morbid obesity related conditions of coronary artery disease with history of MI status CABG and later stenting, chronic back pain on chronic narcotics, depression, poorly controlled type II non-insulin-dependent diabetes mellitus with a hemoglobin A1c of 9.4 (2020), dyspnea exertion, peripheral edema, fatigue, NARANJO with possible cirrhosis and MELD score of 8 and splenic and peritoneal varices, severe tobacco use disorder in early remission, hyperlipidemia, hypertension, insomnia, joint pain, neuropathy, restless leg syndrome, obstructive sleep apnea with noncompliance with CPAP, microcytic anemia, degenerative spine changes on chest x-ray, hepatosplenomegaly, history gastric bezoar removal , COPD.    55-year-old morbidly obese female from [facility] but was spending a lot of time in [facility] near [place] taking care of her mother until she passed away.  She says she became upset after my talk because prior to that no one told  her about the potential contribution of high fructose corn syrup to her NARANJO.  She said she began gaining weight after she quit smoking a couple of years ago.  She says she was never told her A1c was elevated and that insulin has never been recommended.  She says she quit using her CPAP about 6 months ago because the cord broke and she felt she really did not need it.  It is my overall impression that she is not overly concerned with her own health -for example she continued to smoke heavily for years after diagnosed with a heart attack and requiring stents, noncompliance with CPAP, no interest in her diabetes management, extremely poor dietitian evaluation/eating habits, etc.  I discussed this with her and why she would even seek metabolic and bariatric surgery.  She became very tearful and acknowledged my concerns but very much wishes to proceed.  She is aware her surgery will have higher risk of morbidity and mortality given her cirrhosis and varices and nonetheless wishes to proceed.  Further increased risk with her poorly controlled diabetes, coronary artery disease, untreated obstructive sleep apnea, longstanding tobacco history, etc.  She says she is never been told prior to today that she had a heart murmur.  She is aware that if the varices are too large at the time of surgery that I would back out and not be able to safely perform her sleeve gastrectomy.        Past Medical History:   Diagnosis Date   • Aspirin long-term use    • CAD (coronary artery disease)     s/p stenting 2017, on ASA + Plavix, follows w/ Dr. Walters   • Chronic back pain     on Norco daily, denies NSAIDS/steroids   • Chronic narcotic use     managed by PCP   • COPD (chronic obstructive pulmonary disease) (CMS/[facility])    • Depression    • Diabetes (CMS/[facility])     dx 2017, never on insulin, A1C 9.4   • Dyspepsia    • Dyspnea on exertion    • Edema    • Elevated AST (SGOT)    • Fatigue    • Former smoker     quit 2018   • Gastric bezoar      History endoscopic removal Dr. Fernández 2019   • GERD (gastroesophageal reflux disease)     controlled on Omeprazole BID, recent EGD 3/2020 w/ Dr. Fernández   • Hepatosplenomegaly     By CT scan   • History of blood transfusion     With CABG    • Hyperlipidemia    • Hypertension    • Insomnia    • Joint pain     rheumatology eval (P)   • Liver cirrhosis secondary to NARANJO (CMS/HCC)     follows w/ Dr. Fernández in [place].  MELD 8   • MI (myocardial infarction) (CMS/HCC)    • Microcytic anemia    • Morbid obesity (CMS/HCC)    • Neuropathy     on Amitriptyline + Carbamazapine   • RLS (restless legs syndrome)    • Sleep apnea     CPAP noncompliant last 6 months as above   • Tobacco use disorder, severe, in early remission    • Varices of spleen     And peritoneum CT scan 2020     Past Surgical History:   Procedure Laterality Date   •  SECTION     •  SECTION     • COLONOSCOPY     • CORONARY ANGIOPLASTY WITH STENT PLACEMENT     • CORONARY ARTERY BYPASS GRAFT      2 units of packed red blood cells given   • ENDOMETRIAL ABLATION     • ENDOSCOPY      w/ Dr. Fernández   • KNEE SURGERY Right 2015   • LAPAROSCOPIC CHOLECYSTECTOMY  2016    dysfunction, no stones       No Known Allergies    Current Outpatient Medications:   •  aspirin 81 MG EC tablet, Take 81 mg by mouth Daily., Disp: , Rfl:   •  atorvastatin (LIPITOR) 40 MG tablet, Take 40 mg by mouth Daily., Disp: , Rfl:   •  clopidogrel (PLAVIX) 75 MG tablet, Take 75 mg by mouth Daily., Disp: , Rfl:   •  FLUoxetine (PROzac) 40 MG capsule, Take 40 mg by mouth 2 (Two) Times a Day., Disp: , Rfl:   •  HYDROcodone-acetaminophen (NORCO) 7.5-325 MG per tablet, Take 1 tablet by mouth Every 6 (Six) Hours As Needed for Moderate Pain ., Disp: , Rfl:   •  isosorbide mononitrate (IMDUR) 60 MG 24 hr tablet, Take 60 mg by mouth Daily. For chest pain., Disp: , Rfl:   •  lisinopril (PRINIVIL,ZESTRIL) 5 MG tablet, Take 5 mg by mouth Daily., Disp: ,  Rfl:   •  loratadine (CLARITIN) 10 MG tablet, Take 10 mg by mouth Daily., Disp: , Rfl:   •  metFORMIN (GLUCOPHAGE) 1000 MG tablet, Take 1,000 mg by mouth 2 (Two) Times a Day With Meals., Disp: , Rfl:   •  metoprolol tartrate (LOPRESSOR) 100 MG tablet, Take 100 mg by mouth 2 (Two) Times a Day., Disp: , Rfl:   •  montelukast (SINGULAIR) 10 MG tablet, Take 10 mg by mouth Every Night., Disp: , Rfl:   •  nitroglycerin (NITROSTAT) 0.4 MG SL tablet, Place 0.4 mg under the tongue Every 5 (Five) Minutes As Needed for Chest Pain. Take no more than 3 doses in 15 minutes., Disp: , Rfl:   •  omeprazole (priLOSEC) 20 MG capsule, Take 20 mg by mouth 2 (Two) Times a Day., Disp: , Rfl:   •  rOPINIRole (REQUIP) 2 MG tablet, Take 2 mg by mouth Every Night. Restless leg., Disp: , Rfl:   •  zolpidem (AMBIEN) 5 MG tablet, Take 5 mg by mouth At Night As Needed for Sleep., Disp: , Rfl:     Social History     Socioeconomic History   • Marital status:      Spouse name: Not on file   • Number of children: Not on file   • Years of education: Not on file   • Highest education level: Not on file   Tobacco Use   • Smoking status: Former Smoker     Packs/day: 2.50     Years: 30.00     Pack years: 75.00     Quit date: 2018     Years since quitting: 3.0   • Smokeless tobacco: Never Used   Substance and Sexual Activity   • Alcohol use: Never     Frequency: Never   • Drug use: Never   Social History Narrative    Lives in [place], [place].   w/ 2 children (32yo and 21yo).  Unemployed.     Family History   Problem Relation Age of Onset   • Obesity Mother    • Hypertension Mother    • Heart disease Mother    • Hypertension Father    • Heart disease Father    • Heart attack Father    • Obesity Father    • Obesity Sister    • Diabetes Sister    • Hypertension Sister    • COPD Maternal Grandmother    • Obesity Maternal Grandmother    • Hypertension Maternal Grandmother    • Stroke Maternal Grandmother    • Cirrhosis Maternal Grandfather     • Hypertension Paternal Grandmother    • Obesity Paternal Grandmother    • Cancer Paternal Grandfather        Review of Systems   Constitutional: Positive for fatigue and unexpected weight gain. Negative for chills, diaphoresis, fever and unexpected weight loss.   HENT: Negative for congestion and facial swelling.    Eyes: Negative for blurred vision, double vision and discharge.   Respiratory: Negative for chest tightness, shortness of breath and stridor.    Cardiovascular: Negative for chest pain, palpitations and leg swelling.   Gastrointestinal: Positive for GERD. Negative for blood in stool.   Endocrine: Negative for polydipsia.   Genitourinary: Negative for hematuria.   Musculoskeletal: Positive for arthralgias, back pain and myalgias.   Skin: Negative for color change.   Allergic/Immunologic: Negative for immunocompromised state.   Neurological: Negative for confusion.   Psychiatric/Behavioral: Negative for self-injury.       I have reviewed the ROS and confirm that it's accurate today.    Physical Exam:  Vital Signs:  Weight: 101 kg (222 lb 8 oz)   Body mass index is 36.46 kg/m².  Temp: 97.8 °F (36.6 °C)   Heart Rate: 78   BP: 118/76     Physical Exam  Vitals signs reviewed.   Constitutional:       Appearance: She is well-developed.   HENT:      Head: Normocephalic and atraumatic.      Nose:      Comments: mask  Eyes:      Conjunctiva/sclera: Conjunctivae normal.      Pupils: Pupils are equal, round, and reactive to light.   Neck:      Musculoskeletal: Normal range of motion and neck supple.      Thyroid: No thyromegaly.      Vascular: No carotid bruit.      Trachea: No tracheal deviation.   Cardiovascular:      Rate and Rhythm: Normal rate and regular rhythm.      Heart sounds: Murmur present. Systolic murmur present with a grade of 3/6.      Comments: Murmur heard left second intercostal space greater than right  Pulmonary:      Effort: Pulmonary effort is normal. No respiratory distress.      Breath  sounds: Normal breath sounds.   Chest:      Comments: Median sternotomy  Abdominal:      General: There is no distension.      Palpations: Abdomen is soft.      Tenderness: There is no abdominal tenderness.      Comments: Upper laparoscopy scars low transverse scar   Musculoskeletal: Normal range of motion.         General: No deformity.   Skin:     General: Skin is warm and dry.      Findings: No rash.   Neurological:      Mental Status: She is alert and oriented to person, place, and time.      Cranial Nerves: No cranial nerve deficit.      Coordination: Coordination normal.   Psychiatric:         Behavior: Behavior normal.         Thought Content: Thought content normal.         Judgment: Judgment normal.         Patient Active Problem List   Diagnosis   • Fatigue   • CAD (coronary artery disease)   • MI (myocardial infarction) (CMS/HCC)   • Hypertension   • Hyperlipidemia   • Edema   • Sleep apnea   • Diabetes (CMS/HCC)   • Fatty liver   • Joint pain   • RLS (restless legs syndrome)   • Former smoker   • Depression   • Aspirin long-term use   • GERD (gastroesophageal reflux disease)   • Chronic narcotic use   • Insomnia   • Neuropathy   • Chronic back pain   • Dyspepsia   • Dyspnea on exertion   • Morbid obesity (CMS/HCC)   • Morbid exogenous obesity (CMS/HCC)       Assessment:    Alee Wu is a 55 y.o. year old female with medically complicated obesity.    Metabolic and bariatric surgery is deemed medically necessary given the following obesity related comorbidities including coronary artery disease with history of MI status CABG and later stenting, chronic back pain on chronic narcotics, depression, poorly controlled type II non-insulin-dependent diabetes mellitus with a hemoglobin A1c of 9.4, dyspnea exertion, peripheral edema, fatigue, NARANJO with possible cirrhosis and MELD score of 8 and splenic and peritoneal varices, severe tobacco use disorder in early remission, hyperlipidemia, hypertension, insomnia,  joint pain, neuropathy, restless leg syndrome, obstructive sleep apnea with noncompliance with CPAP, microcytic anemia, degenerative spine changes on chest x-ray, hepatosplenomegaly, hx gastric bezoar removal 7/19, COPD with current Weight: 101 kg (222 lb 8 oz) and Body mass index is 36.46 kg/m²..    Encounter Diagnosis   Name Primary?   • Morbid exogenous obesity (CMS/HCC) Yes      Patient is aware that surgery is a tool, and that weight loss and improvement in comorbidities is not guaranteed but only seen in the context of appropriate use, follow up and physical activity.    The patient was present for an approximately a 2.5 hour discussion of the purpose of MBS, how MBS is a tool to assist in achieving weight loss goals, the most common complications and how best to avoid them, and the strategies for short and long term weight loss and improvement in comorbidities.  Ample opportunity to discuss questions was available both in group and during the time of individual examination.    I reviewed her Dane report which is negative despite being on chronic narcotics.  Pulmonary function test dated 6/18/2020 read by Aldair Middleton DO unremarkable.  Labs dated 12/18/2020 showing microcytic anemia with an H&H of 11.5 and 36.8 with microcytic indices normal white count.  Platelet count 163.  CMP normal except for glucose of 242 AST of 36.  3 months ago CMP showed a glucose of 332 CO2 of 20.5 ALT 37 AST 53 alkaline phosphatase 133.  Chest x-ray dated 12/18/2020 showing no active process.  Median sternotomy and CABG.  Degenerative spine changes.  Psychosocial evaluation dated 12/2/2020 Radha Long, PhD good candidate.  Dietitian evaluation dated 10/29/2020 Myriam Veliz RD.  She noted that day the patient had a diet Coke and chocolate covered doughnuts for breakfast, caramel candy snack, pizza for lunch.  EGD dated 3/2/2020  Be Fernández MD showing prominent gastritis and duodenitis, no significant hiatal hernia, no  "varices mentioned.  Biopsies the antrum showed mild chronic gastritis with mild reactive gastropathy duodenal biopsies showed mild chronic duodenitis.  No distal esophageal biopsies obtained.  Preoperative diagnosis was listed as \"abdominal\".  Postop diagnosis listed is gastritis.  Endoscopy Dr. Shirley dated 7/24/2019 where he removed a large gastric bezoar.  At that time he noted normal esophageal mucosa no hiatal hernia Z-line 39 cm mild antral gastritis.  Normal gastric emptying study dated 1/20/2020.  CT scan dated 1/30/2020 showing an enlarged fatty liver, splenomegaly, splenic and peritoneal varices with the peritoneal varices mostly on the left side no ascites.  Negative H. pylori stool antigen dated 6/23/2020.  Pulmonary function test dated 6/17/2020 showing an FVC of 86 FEV1 of 86 DLCO of 80 cardiology clearance dated 9/21/2020 signature illegible.  Cardiology clearance letter dated 9/11/2020 Horacio Miller MD. he noted she has a history of coronary artery bypass grafting surgery, hypertension, COPD and hypercholesterolemia and had a cardiac catheterization showing 2 out of 3 grafts were patent and that the SVG to the OM was occluded.  Also noted was occlusion of the distal LAD being managed medically.  Ejection fraction 60%.  He cleared her at low risk.  Nuclear medicine stress test dated 7/7/2020 showing moderate area of reversible lateral and apical ischemia ejection fraction 63%.  Echocardiogram dated 3/9/2020 showing no abnormalities ejection fraction 65% no valvular abnormalities.  Labs dated 9/10/2020 showing an elevated pro time at 14.3 with an INR normal at 1.14 normal TSH.  Labs dated 9/2/2020 showing an H&H of 11.4 and 35.2 low platelet count 122.  Labs dated 6/23/2020 showing a low platelet count 124 hemoglobin A1c 9.4.  Lipid panel showing triglycerides 315 cholesterol 237  VLDL 63 HDL 36 all abnormal.  Please see scanned records that I have reviewed and signed off on today.  All of " "this in addition to the patient's unique history and exam has been taken into consideration in determining their appropriate candidacy for MBS.    Complications  of laparoscopic/possible robotic gastric sleeve were discussed. The patient is well aware of the potential complications of surgery that include but not limited to bleeding, infections, deep venous thrombosis, pulmonary embolism, pulmonary complications such as pneumonia, cardiac events, hernias, small bowel obstruction, damage to the spleen or other organs, bowel injury, disfiguring scars, failure to lose weight, need for additional surgery, conversion to an open procedure, and death. Patient is also aware of complications which apply in this particular procedure that can include but are not limited to a \"leak\" at the staple line which in some instances may require conversion to gastric bypass.    The patient is aware if a hiatal hernia is encountered, it likely will be repaired.  R/B/A Rx to hiatal hernia repair were discussed as outlined in our long consent form.  Briefly risks in addition to those for LSG include recurrent hernia, MELODY, dysphagia, esophageal injury, pneumothorax, injury to the vagus nerves, injury to the thoracic duct, aorta or vena cava.    I discussed avoiding all tobacco products and second hand smoke at least 2 weeks pre-operatively and 6 weeks post-operatively to minimize the risk of sleeve leak.  This included discussing the importance of avoiding even secondhand smoke as the risk of leak is increased.  Examples discussed:  I made it very clear that the patient understands they should avoid even riding in a car where someone has previously smoked in the last 2 weeks, living in a house where someone smokes (even if it's in a separate room/patio/attached garage, etc.) we discussed that they should not have a conversation with a group of people who are smoking even if it's outside.  They can be around wood burning fires and barbecue.  " I told them I do not know if marijuana has a same effects but my overall recommendation is to avoid it for 2 weeks prior in 6 weeks after surgery.  They also are aware that nicotine may also increase the risk of leak and I strongly encouraged him to avoid that as well for 2 weeks prior in 6 weeks after surgery.    Discussed the risks, benefits and alternative therapies at great length as outlined in our extensive consent forms, consent videos, and educational teaching process under the direction of the center's .    A copy of the patient's signed informed consent is on file.    R/B/A Rx discussed to postop anticoagulation incl but not limited to bleeding, drug reaction, venothromboembolic events, etc. and the patient will continue on her Plavix and hold her aspirin 1 week prior in 1 week  (not 2 or the preferred 6 weeks) after surgery although she understands that this could increase her risk of delayed leak.      I explained that my usual practice with cardiac stent patients has been to hold the ASA and/or plavix 1 wk prior and instead of the 6 wks afterwards (safest to hopefully avoid a leak), resume at 2 wks post op with PPI coverage.  So far, this has not resulted in any leaks to date, but the sample size is relatively small.  The idea is to balance the risk of stent occlusion with the risk of leak and there's no perfect solution and there will be an increased risk of delayed leak.        Plan: After evaluation today I think the patient is a much higher but not prohibitive risk to proceed with laparoscopic sleeve gastrectomy and EGD.  Once again if her varices are too large I will abort as it cannot be performed safely.  Type and screen.  Increased risk of delayed leak on aspirin as above.  Other issues include coronary artery disease with history of MI status CABG and later stenting with occluded saphenous vein graft to her obtuse marginal and distal occlusion of her LAD, chronic back pain on  chronic narcotics, depression, poorly controlled type II non-insulin-dependent diabetes mellitus with a hemoglobin A1c of 9.4, dyspnea exertion, peripheral edema, fatigue, NARANJO cirrhosis and MELD score of 8 and splenic and peritoneal varices, severe tobacco use disorder in early remission, hyperlipidemia, hypertension, insomnia, joint pain, neuropathy, restless leg syndrome, obstructive sleep apnea with noncompliance with CPAP, microcytic anemia with increase risk for perioperative transfusion, degenerative spine changes on chest x-ray, hepatosplenomegaly, hx gastric bezoar removal 7/19, COPD, poor dietitian evaluation, heart murmur, intermittent thrombocytopenia.      Raymundo Mcnamara MD              Answers for HPI/ROS submitted by the patient on 12/16/2020   What is the primary reason for your visit?: Other  Please describe your symptoms.: Consultation for gastric sleeve surgery  Have you had these symptoms before?: No  How long have you been having these symptoms?: Greater than 2 weeks  Please list any medications you are currently taking for this condition.: See attached  Please describe any probable cause for these symptoms. : See attached

## 2021-01-18 ENCOUNTER — APPOINTMENT (OUTPATIENT)
Dept: PREADMISSION TESTING | Facility: HOSPITAL | Age: 56
End: 2021-01-18

## 2021-01-18 DIAGNOSIS — E66.01 MORBID EXOGENOUS OBESITY (HCC): ICD-10-CM

## 2021-01-18 LAB
ABO GROUP BLD: NORMAL
BLD GP AB SCN SERPL QL: NEGATIVE
DEPRECATED RDW RBC AUTO: 43.9 FL (ref 37–54)
ERYTHROCYTE [DISTWIDTH] IN BLOOD BY AUTOMATED COUNT: 14.9 % (ref 12.3–15.4)
HBA1C MFR BLD: 8.6 % (ref 4.8–5.6)
HCT VFR BLD AUTO: 37.3 % (ref 34–46.6)
HGB BLD-MCNC: 11.5 G/DL (ref 12–15.9)
MCH RBC QN AUTO: 25.4 PG (ref 26.6–33)
MCHC RBC AUTO-ENTMCNC: 30.8 G/DL (ref 31.5–35.7)
MCV RBC AUTO: 82.5 FL (ref 79–97)
PLATELET # BLD AUTO: 148 10*3/MM3 (ref 140–450)
PMV BLD AUTO: 8.8 FL (ref 6–12)
POTASSIUM SERPL-SCNC: 4.1 MMOL/L (ref 3.5–5.2)
RBC # BLD AUTO: 4.52 10*6/MM3 (ref 3.77–5.28)
RH BLD: POSITIVE
T&S EXPIRATION DATE: NORMAL
WBC # BLD AUTO: 7.2 10*3/MM3 (ref 3.4–10.8)

## 2021-01-18 PROCEDURE — 86900 BLOOD TYPING SEROLOGIC ABO: CPT

## 2021-01-18 PROCEDURE — 85027 COMPLETE CBC AUTOMATED: CPT

## 2021-01-18 PROCEDURE — 86901 BLOOD TYPING SEROLOGIC RH(D): CPT

## 2021-01-18 PROCEDURE — 83036 HEMOGLOBIN GLYCOSYLATED A1C: CPT

## 2021-01-18 PROCEDURE — 86850 RBC ANTIBODY SCREEN: CPT

## 2021-01-18 PROCEDURE — 36415 COLL VENOUS BLD VENIPUNCTURE: CPT

## 2021-01-18 PROCEDURE — C9803 HOPD COVID-19 SPEC COLLECT: HCPCS

## 2021-01-18 PROCEDURE — 93005 ELECTROCARDIOGRAM TRACING: CPT

## 2021-01-18 PROCEDURE — 93010 ELECTROCARDIOGRAM REPORT: CPT | Performed by: INTERNAL MEDICINE

## 2021-01-18 PROCEDURE — 84132 ASSAY OF SERUM POTASSIUM: CPT

## 2021-01-18 PROCEDURE — U0004 COV-19 TEST NON-CDC HGH THRU: HCPCS

## 2021-01-18 RX ORDER — GLIPIZIDE 5 MG/1
5 TABLET ORAL
COMMUNITY
End: 2021-01-22 | Stop reason: HOSPADM

## 2021-01-18 NOTE — PAT
Patient to apply Chlorhexadine wipes  to surgical area (as instructed) the night before procedure and the AM of procedure. Wipes provided.    Patient instructed to drink 20 ounces (or until full) of Gatorade and it needs to be completed 1 hour before given arrival time for procedure (NO RED Gatorade)    Patient verbalized understanding.    Blood bank bracelet applied to patient during Pre Admission Testing visit.  Patient instructed not to remove from arm until after procedure and they are discharged from the hospital.  Explained to patient that they may shower and get the bracelet wet, but not to immerse under water for longer periods (bathing, swimming, hand dishwashing, etc).  Patient verbalized understanding.    Per Anesthesia Request, patient instructed not to take their ACE/ARB medications on the AM of surgery.

## 2021-01-19 ENCOUNTER — ANESTHESIA EVENT (OUTPATIENT)
Dept: PERIOP | Facility: HOSPITAL | Age: 56
End: 2021-01-19

## 2021-01-19 LAB
QT INTERVAL: 452 MS
QTC INTERVAL: 436 MS
SARS-COV-2 RNA RESP QL NAA+PROBE: NOT DETECTED

## 2021-01-19 RX ORDER — FAMOTIDINE 10 MG/ML
20 INJECTION, SOLUTION INTRAVENOUS ONCE
Status: CANCELLED | OUTPATIENT
Start: 2021-01-19 | End: 2021-01-19

## 2021-01-19 RX ORDER — SODIUM CHLORIDE 0.9 % (FLUSH) 0.9 %
10 SYRINGE (ML) INJECTION EVERY 12 HOURS SCHEDULED
Status: CANCELLED | OUTPATIENT
Start: 2021-01-19

## 2021-01-19 RX ORDER — FAMOTIDINE 20 MG/1
20 TABLET, FILM COATED ORAL ONCE
Status: CANCELLED | OUTPATIENT
Start: 2021-01-19 | End: 2021-01-19

## 2021-01-20 ENCOUNTER — HOSPITAL ENCOUNTER (INPATIENT)
Facility: HOSPITAL | Age: 56
LOS: 2 days | Discharge: HOME | End: 2021-01-22
Attending: SURGERY | Admitting: SURGERY

## 2021-01-20 ENCOUNTER — ANESTHESIA (OUTPATIENT)
Dept: PERIOP | Facility: HOSPITAL | Age: 56
End: 2021-01-20

## 2021-01-20 DIAGNOSIS — E66.01 MORBID EXOGENOUS OBESITY (HCC): ICD-10-CM

## 2021-01-20 LAB
ABO GROUP BLD: NORMAL
B-HCG UR QL: NEGATIVE
GLUCOSE BLDC GLUCOMTR-MCNC: 109 MG/DL (ref 70–130)
GLUCOSE BLDC GLUCOMTR-MCNC: 167 MG/DL (ref 70–130)
GLUCOSE BLDC GLUCOMTR-MCNC: 185 MG/DL (ref 70–130)
GLUCOSE BLDC GLUCOMTR-MCNC: 192 MG/DL (ref 70–130)
INTERNAL NEGATIVE CONTROL: NEGATIVE
INTERNAL POSITIVE CONTROL: POSITIVE
Lab: NORMAL
RH BLD: POSITIVE

## 2021-01-20 PROCEDURE — 81025 URINE PREGNANCY TEST: CPT | Performed by: SURGERY

## 2021-01-20 PROCEDURE — 47001 NDL BIOPSY LVR TM OTH MAJ PX: CPT | Performed by: SURGERY

## 2021-01-20 PROCEDURE — 0DJ08ZZ INSPECTION OF UPPER INTESTINAL TRACT, VIA NATURAL OR ARTIFICIAL OPENING ENDOSCOPIC: ICD-10-PCS | Performed by: SURGERY

## 2021-01-20 PROCEDURE — 88307 TISSUE EXAM BY PATHOLOGIST: CPT | Performed by: SURGERY

## 2021-01-20 PROCEDURE — 25010000002 DEXAMETHASONE SODIUM PHOSPHATE 10 MG/ML SOLUTION: Performed by: ANESTHESIOLOGY

## 2021-01-20 PROCEDURE — 63710000001 PROMETHAZINE PER 12.5 MG: Performed by: SURGERY

## 2021-01-20 PROCEDURE — 25010000002 CEFAZOLIN PER 500 MG: Performed by: SURGERY

## 2021-01-20 PROCEDURE — 82962 GLUCOSE BLOOD TEST: CPT

## 2021-01-20 PROCEDURE — 43775 LAP SLEEVE GASTRECTOMY: CPT | Performed by: SURGERY

## 2021-01-20 PROCEDURE — 86901 BLOOD TYPING SEROLOGIC RH(D): CPT

## 2021-01-20 PROCEDURE — 86900 BLOOD TYPING SEROLOGIC ABO: CPT

## 2021-01-20 PROCEDURE — 25010000002 FENTANYL CITRATE (PF) 100 MCG/2ML SOLUTION: Performed by: NURSE ANESTHETIST, CERTIFIED REGISTERED

## 2021-01-20 PROCEDURE — 88313 SPECIAL STAINS GROUP 2: CPT | Performed by: SURGERY

## 2021-01-20 PROCEDURE — 25010000002 ENOXAPARIN PER 10 MG: Performed by: SURGERY

## 2021-01-20 PROCEDURE — 63710000001 INSULIN LISPRO (HUMAN) PER 5 UNITS: Performed by: SURGERY

## 2021-01-20 PROCEDURE — 25010000003 CEFAZOLIN IN DEXTROSE 2-4 GM/100ML-% SOLUTION: Performed by: SURGERY

## 2021-01-20 PROCEDURE — 25010000002 PROPOFOL 10 MG/ML EMULSION: Performed by: NURSE ANESTHETIST, CERTIFIED REGISTERED

## 2021-01-20 PROCEDURE — 25010000002 ONDANSETRON PER 1 MG: Performed by: SURGERY

## 2021-01-20 PROCEDURE — 0FB04ZX EXCISION OF LIVER, PERCUTANEOUS ENDOSCOPIC APPROACH, DIAGNOSTIC: ICD-10-PCS | Performed by: SURGERY

## 2021-01-20 PROCEDURE — 25010000002 HYDROMORPHONE PER 4 MG: Performed by: NURSE ANESTHETIST, CERTIFIED REGISTERED

## 2021-01-20 PROCEDURE — 25010000002 HYDROMORPHONE 1 MG/ML SOLUTION: Performed by: SURGERY

## 2021-01-20 PROCEDURE — 25010000002 NEOSTIGMINE 10 MG/10ML SOLUTION: Performed by: NURSE ANESTHETIST, CERTIFIED REGISTERED

## 2021-01-20 PROCEDURE — 0DB64Z3 EXCISION OF STOMACH, PERCUTANEOUS ENDOSCOPIC APPROACH, VERTICAL: ICD-10-PCS | Performed by: SURGERY

## 2021-01-20 DEVICE — REINFORCED INTELLIGENT RELOAD, FOR USE WITH SIGNIA STAPLING SYSTEM
Type: IMPLANTABLE DEVICE | Site: ABDOMEN | Status: FUNCTIONAL
Brand: TRI-STAPLE 2.0

## 2021-01-20 DEVICE — BLACK REINFORCED INTELLIGENT RELOAD, FOR USE WITH SIGNIA STAPLING SYSTEM
Type: IMPLANTABLE DEVICE | Site: ABDOMEN | Status: FUNCTIONAL
Brand: TRI-STAPLE 2.0

## 2021-01-20 DEVICE — SEALANT WND FIBRIN TISSEEL PREFIL/SYR/PRIMAFZ 10ML: Type: IMPLANTABLE DEVICE | Site: ABDOMEN | Status: FUNCTIONAL

## 2021-01-20 RX ORDER — MAGNESIUM HYDROXIDE 1200 MG/15ML
LIQUID ORAL AS NEEDED
Status: DISCONTINUED | OUTPATIENT
Start: 2021-01-20 | End: 2021-01-20 | Stop reason: HOSPADM

## 2021-01-20 RX ORDER — ALPRAZOLAM 0.25 MG/1
0.25 TABLET ORAL ONCE AS NEEDED
Status: DISCONTINUED | OUTPATIENT
Start: 2021-01-20 | End: 2021-01-22 | Stop reason: HOSPADM

## 2021-01-20 RX ORDER — SODIUM CHLORIDE 9 MG/ML
150 INJECTION, SOLUTION INTRAVENOUS CONTINUOUS
Status: DISCONTINUED | OUTPATIENT
Start: 2021-01-20 | End: 2021-01-20 | Stop reason: HOSPADM

## 2021-01-20 RX ORDER — CEFAZOLIN SODIUM 2 G/100ML
2 INJECTION, SOLUTION INTRAVENOUS EVERY 8 HOURS
Status: COMPLETED | OUTPATIENT
Start: 2021-01-20 | End: 2021-01-21

## 2021-01-20 RX ORDER — ONDANSETRON 2 MG/ML
4 INJECTION INTRAMUSCULAR; INTRAVENOUS ONCE AS NEEDED
Status: DISCONTINUED | OUTPATIENT
Start: 2021-01-20 | End: 2021-01-20 | Stop reason: HOSPADM

## 2021-01-20 RX ORDER — SIMETHICONE 80 MG
80 TABLET,CHEWABLE ORAL 4 TIMES DAILY PRN
Status: DISCONTINUED | OUTPATIENT
Start: 2021-01-20 | End: 2021-01-22 | Stop reason: HOSPADM

## 2021-01-20 RX ORDER — CHLORHEXIDINE GLUCONATE 0.12 MG/ML
30 RINSE ORAL
Status: DISCONTINUED | OUTPATIENT
Start: 2021-01-20 | End: 2021-01-20

## 2021-01-20 RX ORDER — DIPHENHYDRAMINE HYDROCHLORIDE 50 MG/ML
25 INJECTION INTRAMUSCULAR; INTRAVENOUS EVERY 4 HOURS PRN
Status: DISCONTINUED | OUTPATIENT
Start: 2021-01-20 | End: 2021-01-22 | Stop reason: HOSPADM

## 2021-01-20 RX ORDER — MONTELUKAST SODIUM 10 MG/1
10 TABLET ORAL NIGHTLY PRN
Status: DISCONTINUED | OUTPATIENT
Start: 2021-01-20 | End: 2021-01-22 | Stop reason: HOSPADM

## 2021-01-20 RX ORDER — ALBUTEROL SULFATE 2.5 MG/3ML
2.5 SOLUTION RESPIRATORY (INHALATION)
Status: DISCONTINUED | OUTPATIENT
Start: 2021-01-20 | End: 2021-01-22 | Stop reason: HOSPADM

## 2021-01-20 RX ORDER — SODIUM CHLORIDE, SODIUM LACTATE, POTASSIUM CHLORIDE, CALCIUM CHLORIDE 600; 310; 30; 20 MG/100ML; MG/100ML; MG/100ML; MG/100ML
9 INJECTION, SOLUTION INTRAVENOUS CONTINUOUS
Status: DISCONTINUED | OUTPATIENT
Start: 2021-01-20 | End: 2021-01-20 | Stop reason: HOSPADM

## 2021-01-20 RX ORDER — NITROGLYCERIN 0.4 MG/1
0.4 TABLET SUBLINGUAL
Status: DISCONTINUED | OUTPATIENT
Start: 2021-01-20 | End: 2021-01-22 | Stop reason: HOSPADM

## 2021-01-20 RX ORDER — SODIUM CHLORIDE 0.9 % (FLUSH) 0.9 %
10 SYRINGE (ML) INJECTION AS NEEDED
Status: DISCONTINUED | OUTPATIENT
Start: 2021-01-20 | End: 2021-01-20 | Stop reason: HOSPADM

## 2021-01-20 RX ORDER — HYDRALAZINE HYDROCHLORIDE 20 MG/ML
10 INJECTION INTRAMUSCULAR; INTRAVENOUS
Status: DISCONTINUED | OUTPATIENT
Start: 2021-01-20 | End: 2021-01-22 | Stop reason: HOSPADM

## 2021-01-20 RX ORDER — ISOSORBIDE MONONITRATE 60 MG/1
60 TABLET, EXTENDED RELEASE ORAL DAILY
Status: DISCONTINUED | OUTPATIENT
Start: 2021-01-20 | End: 2021-01-22 | Stop reason: HOSPADM

## 2021-01-20 RX ORDER — METOCLOPRAMIDE HYDROCHLORIDE 5 MG/ML
10 INJECTION INTRAMUSCULAR; INTRAVENOUS EVERY 6 HOURS PRN
Status: DISCONTINUED | OUTPATIENT
Start: 2021-01-20 | End: 2021-01-22 | Stop reason: HOSPADM

## 2021-01-20 RX ORDER — SCOLOPAMINE TRANSDERMAL SYSTEM 1 MG/1
1 PATCH, EXTENDED RELEASE TRANSDERMAL ONCE
Status: DISCONTINUED | OUTPATIENT
Start: 2021-01-20 | End: 2021-01-20

## 2021-01-20 RX ORDER — PANTOPRAZOLE SODIUM 40 MG/10ML
40 INJECTION, POWDER, LYOPHILIZED, FOR SOLUTION INTRAVENOUS ONCE
Status: COMPLETED | OUTPATIENT
Start: 2021-01-20 | End: 2021-01-20

## 2021-01-20 RX ORDER — FIBRINOGEN HUMAN AND THROMBIN HUMAN 10 ML
KIT TOPICAL AS NEEDED
Status: DISCONTINUED | OUTPATIENT
Start: 2021-01-20 | End: 2021-01-20 | Stop reason: HOSPADM

## 2021-01-20 RX ORDER — CEFAZOLIN SODIUM 2 G/100ML
2 INJECTION, SOLUTION INTRAVENOUS ONCE
Status: COMPLETED | OUTPATIENT
Start: 2021-01-20 | End: 2021-01-20

## 2021-01-20 RX ORDER — DEXAMETHASONE SODIUM PHOSPHATE 10 MG/ML
INJECTION, SOLUTION INTRAMUSCULAR; INTRAVENOUS
Status: COMPLETED | OUTPATIENT
Start: 2021-01-20 | End: 2021-01-20

## 2021-01-20 RX ORDER — LABETALOL HYDROCHLORIDE 5 MG/ML
5 INJECTION, SOLUTION INTRAVENOUS
Status: DISCONTINUED | OUTPATIENT
Start: 2021-01-20 | End: 2021-01-20 | Stop reason: HOSPADM

## 2021-01-20 RX ORDER — LIDOCAINE HYDROCHLORIDE 10 MG/ML
INJECTION, SOLUTION EPIDURAL; INFILTRATION; INTRACAUDAL; PERINEURAL AS NEEDED
Status: DISCONTINUED | OUTPATIENT
Start: 2021-01-20 | End: 2021-01-20 | Stop reason: SURG

## 2021-01-20 RX ORDER — NEOSTIGMINE METHYLSULFATE 1 MG/ML
INJECTION, SOLUTION INTRAVENOUS AS NEEDED
Status: DISCONTINUED | OUTPATIENT
Start: 2021-01-20 | End: 2021-01-20 | Stop reason: SURG

## 2021-01-20 RX ORDER — GABAPENTIN 100 MG/1
100 CAPSULE ORAL 3 TIMES DAILY
Status: COMPLETED | OUTPATIENT
Start: 2021-01-20 | End: 2021-01-22

## 2021-01-20 RX ORDER — PANTOPRAZOLE SODIUM 40 MG/10ML
40 INJECTION, POWDER, LYOPHILIZED, FOR SOLUTION INTRAVENOUS
Status: DISCONTINUED | OUTPATIENT
Start: 2021-01-21 | End: 2021-01-22 | Stop reason: HOSPADM

## 2021-01-20 RX ORDER — METOPROLOL TARTRATE 100 MG/1
100 TABLET ORAL EVERY 12 HOURS SCHEDULED
Status: DISCONTINUED | OUTPATIENT
Start: 2021-01-20 | End: 2021-01-22 | Stop reason: HOSPADM

## 2021-01-20 RX ORDER — SODIUM CHLORIDE 9 MG/ML
100 INJECTION, SOLUTION INTRAVENOUS CONTINUOUS
Status: DISCONTINUED | OUTPATIENT
Start: 2021-01-20 | End: 2021-01-22 | Stop reason: HOSPADM

## 2021-01-20 RX ORDER — PROMETHAZINE HYDROCHLORIDE 25 MG/1
25 TABLET ORAL ONCE AS NEEDED
Status: DISCONTINUED | OUTPATIENT
Start: 2021-01-20 | End: 2021-01-20 | Stop reason: HOSPADM

## 2021-01-20 RX ORDER — NALOXONE HCL 0.4 MG/ML
0.4 VIAL (ML) INJECTION
Status: DISCONTINUED | OUTPATIENT
Start: 2021-01-20 | End: 2021-01-22 | Stop reason: HOSPADM

## 2021-01-20 RX ORDER — FLUOXETINE HYDROCHLORIDE 20 MG/1
40 CAPSULE ORAL 2 TIMES DAILY
Status: DISCONTINUED | OUTPATIENT
Start: 2021-01-20 | End: 2021-01-22 | Stop reason: HOSPADM

## 2021-01-20 RX ORDER — GABAPENTIN 300 MG/1
600 CAPSULE ORAL ONCE
Status: COMPLETED | OUTPATIENT
Start: 2021-01-20 | End: 2021-01-20

## 2021-01-20 RX ORDER — FENTANYL CITRATE 50 UG/ML
INJECTION, SOLUTION INTRAMUSCULAR; INTRAVENOUS AS NEEDED
Status: DISCONTINUED | OUTPATIENT
Start: 2021-01-20 | End: 2021-01-20 | Stop reason: SURG

## 2021-01-20 RX ORDER — PROCHLORPERAZINE MALEATE 10 MG
10 TABLET ORAL EVERY 6 HOURS PRN
Status: DISCONTINUED | OUTPATIENT
Start: 2021-01-20 | End: 2021-01-22 | Stop reason: HOSPADM

## 2021-01-20 RX ORDER — ACETAMINOPHEN 500 MG
1000 TABLET ORAL ONCE
Status: COMPLETED | OUTPATIENT
Start: 2021-01-20 | End: 2021-01-20

## 2021-01-20 RX ORDER — ONDANSETRON 4 MG/1
4 TABLET, FILM COATED ORAL EVERY 6 HOURS PRN
Status: DISCONTINUED | OUTPATIENT
Start: 2021-01-21 | End: 2021-01-22 | Stop reason: HOSPADM

## 2021-01-20 RX ORDER — GLYCOPYRROLATE 0.2 MG/ML
INJECTION INTRAMUSCULAR; INTRAVENOUS AS NEEDED
Status: DISCONTINUED | OUTPATIENT
Start: 2021-01-20 | End: 2021-01-20 | Stop reason: SURG

## 2021-01-20 RX ORDER — ROPINIROLE 2 MG/1
2 TABLET, FILM COATED ORAL NIGHTLY
Status: DISCONTINUED | OUTPATIENT
Start: 2021-01-20 | End: 2021-01-22 | Stop reason: HOSPADM

## 2021-01-20 RX ORDER — HYDROMORPHONE HYDROCHLORIDE 2 MG/1
2 TABLET ORAL EVERY 4 HOURS PRN
Status: DISCONTINUED | OUTPATIENT
Start: 2021-01-20 | End: 2021-01-22 | Stop reason: HOSPADM

## 2021-01-20 RX ORDER — HYDROMORPHONE HYDROCHLORIDE 1 MG/ML
0.5 INJECTION, SOLUTION INTRAMUSCULAR; INTRAVENOUS; SUBCUTANEOUS
Status: DISCONTINUED | OUTPATIENT
Start: 2021-01-20 | End: 2021-01-20 | Stop reason: HOSPADM

## 2021-01-20 RX ORDER — FENTANYL CITRATE 50 UG/ML
50 INJECTION, SOLUTION INTRAMUSCULAR; INTRAVENOUS
Status: DISCONTINUED | OUTPATIENT
Start: 2021-01-20 | End: 2021-01-20 | Stop reason: HOSPADM

## 2021-01-20 RX ORDER — LORAZEPAM 2 MG/ML
0.5 INJECTION INTRAMUSCULAR EVERY 12 HOURS PRN
Status: DISCONTINUED | OUTPATIENT
Start: 2021-01-20 | End: 2021-01-22 | Stop reason: HOSPADM

## 2021-01-20 RX ORDER — CETIRIZINE HYDROCHLORIDE 10 MG/1
10 TABLET ORAL DAILY
Status: DISCONTINUED | OUTPATIENT
Start: 2021-01-20 | End: 2021-01-22 | Stop reason: HOSPADM

## 2021-01-20 RX ORDER — MORPHINE SULFATE 4 MG/ML
4 INJECTION, SOLUTION INTRAMUSCULAR; INTRAVENOUS
Status: DISCONTINUED | OUTPATIENT
Start: 2021-01-20 | End: 2021-01-22 | Stop reason: HOSPADM

## 2021-01-20 RX ORDER — BUPIVACAINE HYDROCHLORIDE 2.5 MG/ML
INJECTION, SOLUTION EPIDURAL; INFILTRATION; INTRACAUDAL
Status: COMPLETED | OUTPATIENT
Start: 2021-01-20 | End: 2021-01-20

## 2021-01-20 RX ORDER — IPRATROPIUM BROMIDE AND ALBUTEROL SULFATE 2.5; .5 MG/3ML; MG/3ML
3 SOLUTION RESPIRATORY (INHALATION) ONCE AS NEEDED
Status: DISCONTINUED | OUTPATIENT
Start: 2021-01-20 | End: 2021-01-20 | Stop reason: HOSPADM

## 2021-01-20 RX ORDER — SODIUM CHLORIDE 9 MG/ML
INJECTION, SOLUTION INTRAVENOUS AS NEEDED
Status: DISCONTINUED | OUTPATIENT
Start: 2021-01-20 | End: 2021-01-20 | Stop reason: HOSPADM

## 2021-01-20 RX ORDER — HYDROCODONE BITARTRATE AND ACETAMINOPHEN 7.5; 325 MG/1; MG/1
1 TABLET ORAL EVERY 6 HOURS PRN
Status: DISCONTINUED | OUTPATIENT
Start: 2021-01-20 | End: 2021-01-22 | Stop reason: HOSPADM

## 2021-01-20 RX ORDER — EPHEDRINE SULFATE 50 MG/ML
INJECTION, SOLUTION INTRAVENOUS AS NEEDED
Status: DISCONTINUED | OUTPATIENT
Start: 2021-01-20 | End: 2021-01-20 | Stop reason: SURG

## 2021-01-20 RX ORDER — ONDANSETRON 2 MG/ML
4 INJECTION INTRAMUSCULAR; INTRAVENOUS EVERY 6 HOURS
Status: COMPLETED | OUTPATIENT
Start: 2021-01-20 | End: 2021-01-21

## 2021-01-20 RX ORDER — LORAZEPAM 1 MG/1
1 TABLET ORAL EVERY 12 HOURS PRN
Status: DISCONTINUED | OUTPATIENT
Start: 2021-01-20 | End: 2021-01-22 | Stop reason: HOSPADM

## 2021-01-20 RX ORDER — LISINOPRIL 5 MG/1
5 TABLET ORAL DAILY
Status: DISCONTINUED | OUTPATIENT
Start: 2021-01-20 | End: 2021-01-22 | Stop reason: HOSPADM

## 2021-01-20 RX ORDER — CYANOCOBALAMIN 1000 UG/ML
1000 INJECTION, SOLUTION INTRAMUSCULAR; SUBCUTANEOUS ONCE
Status: COMPLETED | OUTPATIENT
Start: 2021-01-21 | End: 2021-01-21

## 2021-01-20 RX ORDER — OXYCODONE HYDROCHLORIDE 5 MG/1
5 TABLET ORAL EVERY 6 HOURS PRN
Status: DISCONTINUED | OUTPATIENT
Start: 2021-01-20 | End: 2021-01-22 | Stop reason: HOSPADM

## 2021-01-20 RX ORDER — NALOXONE HCL 0.4 MG/ML
0.1 VIAL (ML) INJECTION
Status: DISCONTINUED | OUTPATIENT
Start: 2021-01-20 | End: 2021-01-22 | Stop reason: HOSPADM

## 2021-01-20 RX ORDER — SODIUM CHLORIDE AND POTASSIUM CHLORIDE 150; 450 MG/100ML; MG/100ML
50 INJECTION, SOLUTION INTRAVENOUS CONTINUOUS
Status: DISCONTINUED | OUTPATIENT
Start: 2021-01-21 | End: 2021-01-22 | Stop reason: HOSPADM

## 2021-01-20 RX ORDER — ROCURONIUM BROMIDE 10 MG/ML
INJECTION, SOLUTION INTRAVENOUS AS NEEDED
Status: DISCONTINUED | OUTPATIENT
Start: 2021-01-20 | End: 2021-01-20 | Stop reason: SURG

## 2021-01-20 RX ORDER — ZOLPIDEM TARTRATE 5 MG/1
5 TABLET ORAL NIGHTLY PRN
Status: DISCONTINUED | OUTPATIENT
Start: 2021-01-20 | End: 2021-01-22 | Stop reason: HOSPADM

## 2021-01-20 RX ORDER — ACETAMINOPHEN 500 MG
500 TABLET ORAL EVERY 8 HOURS SCHEDULED
Status: DISCONTINUED | OUTPATIENT
Start: 2021-01-20 | End: 2021-01-22 | Stop reason: HOSPADM

## 2021-01-20 RX ORDER — LIDOCAINE HYDROCHLORIDE 10 MG/ML
0.5 INJECTION, SOLUTION EPIDURAL; INFILTRATION; INTRACAUDAL; PERINEURAL ONCE AS NEEDED
Status: COMPLETED | OUTPATIENT
Start: 2021-01-20 | End: 2021-01-20

## 2021-01-20 RX ORDER — PROPOFOL 10 MG/ML
VIAL (ML) INTRAVENOUS AS NEEDED
Status: DISCONTINUED | OUTPATIENT
Start: 2021-01-20 | End: 2021-01-20 | Stop reason: SURG

## 2021-01-20 RX ORDER — ATORVASTATIN CALCIUM 40 MG/1
40 TABLET, FILM COATED ORAL DAILY
Status: DISCONTINUED | OUTPATIENT
Start: 2021-01-20 | End: 2021-01-22 | Stop reason: HOSPADM

## 2021-01-20 RX ORDER — PROMETHAZINE HYDROCHLORIDE 25 MG/1
25 SUPPOSITORY RECTAL ONCE AS NEEDED
Status: DISCONTINUED | OUTPATIENT
Start: 2021-01-20 | End: 2021-01-20 | Stop reason: HOSPADM

## 2021-01-20 RX ORDER — PROMETHAZINE HYDROCHLORIDE 12.5 MG/1
12.5 TABLET ORAL EVERY 6 HOURS PRN
Status: DISCONTINUED | OUTPATIENT
Start: 2021-01-20 | End: 2021-01-22 | Stop reason: HOSPADM

## 2021-01-20 RX ADMIN — FENTANYL CITRATE 50 MCG: 50 INJECTION, SOLUTION INTRAMUSCULAR; INTRAVENOUS at 11:39

## 2021-01-20 RX ADMIN — HYDROCODONE BITARTRATE AND ACETAMINOPHEN 1 TABLET: 7.5; 325 TABLET ORAL at 21:18

## 2021-01-20 RX ADMIN — SODIUM CHLORIDE 150 ML/HR: 9 INJECTION, SOLUTION INTRAVENOUS at 10:12

## 2021-01-20 RX ADMIN — ACETAMINOPHEN 1000 MG: 500 TABLET ORAL at 10:06

## 2021-01-20 RX ADMIN — NEOSTIGMINE 5 MG: 1 INJECTION INTRAVENOUS at 12:40

## 2021-01-20 RX ADMIN — GABAPENTIN 100 MG: 100 CAPSULE ORAL at 15:26

## 2021-01-20 RX ADMIN — HYDROMORPHONE HYDROCHLORIDE 0.5 MG: 1 INJECTION, SOLUTION INTRAMUSCULAR; INTRAVENOUS; SUBCUTANEOUS at 13:24

## 2021-01-20 RX ADMIN — SODIUM CHLORIDE, POTASSIUM CHLORIDE, SODIUM LACTATE AND CALCIUM CHLORIDE: 600; 310; 30; 20 INJECTION, SOLUTION INTRAVENOUS at 11:35

## 2021-01-20 RX ADMIN — BUPIVACAINE HYDROCHLORIDE 60 ML: 2.5 INJECTION, SOLUTION EPIDURAL; INFILTRATION; INTRACAUDAL; PERINEURAL at 11:45

## 2021-01-20 RX ADMIN — LIDOCAINE HYDROCHLORIDE 0.5 ML: 10 INJECTION, SOLUTION EPIDURAL; INFILTRATION; INTRACAUDAL; PERINEURAL at 09:46

## 2021-01-20 RX ADMIN — PANTOPRAZOLE SODIUM 40 MG: 40 INJECTION, POWDER, FOR SOLUTION INTRAVENOUS at 10:07

## 2021-01-20 RX ADMIN — ONDANSETRON 4 MG: 2 INJECTION INTRAMUSCULAR; INTRAVENOUS at 23:29

## 2021-01-20 RX ADMIN — ROCURONIUM BROMIDE 50 MG: 10 INJECTION INTRAVENOUS at 11:39

## 2021-01-20 RX ADMIN — GABAPENTIN 600 MG: 300 CAPSULE ORAL at 10:06

## 2021-01-20 RX ADMIN — LIDOCAINE HYDROCHLORIDE 50 MG: 10 INJECTION, SOLUTION EPIDURAL; INFILTRATION; INTRACAUDAL; PERINEURAL at 11:39

## 2021-01-20 RX ADMIN — CHLORHEXIDINE GLUCONATE 30 ML: 1.2 SOLUTION ORAL at 10:07

## 2021-01-20 RX ADMIN — CEFAZOLIN 2 G: 10 INJECTION, POWDER, FOR SOLUTION INTRAVENOUS at 20:27

## 2021-01-20 RX ADMIN — SCOPALAMINE 1 PATCH: 1 PATCH, EXTENDED RELEASE TRANSDERMAL at 10:07

## 2021-01-20 RX ADMIN — ZOLPIDEM TARTRATE 5 MG: 5 TABLET ORAL at 20:32

## 2021-01-20 RX ADMIN — GLYCOPYRROLATE 0.8 MG: 0.4 INJECTION INTRAMUSCULAR; INTRAVENOUS at 12:40

## 2021-01-20 RX ADMIN — INSULIN LISPRO 2 UNITS: 100 INJECTION, SOLUTION INTRAVENOUS; SUBCUTANEOUS at 17:24

## 2021-01-20 RX ADMIN — ISOSORBIDE MONONITRATE 60 MG: 60 TABLET, EXTENDED RELEASE ORAL at 15:26

## 2021-01-20 RX ADMIN — ONDANSETRON 4 MG: 2 INJECTION INTRAMUSCULAR; INTRAVENOUS at 17:25

## 2021-01-20 RX ADMIN — HYDROMORPHONE HYDROCHLORIDE 1 MG: 1 INJECTION, SOLUTION INTRAMUSCULAR; INTRAVENOUS; SUBCUTANEOUS at 14:34

## 2021-01-20 RX ADMIN — INSULIN LISPRO 2 UNITS: 100 INJECTION, SOLUTION INTRAVENOUS; SUBCUTANEOUS at 21:18

## 2021-01-20 RX ADMIN — ROCURONIUM BROMIDE 10 MG: 10 INJECTION INTRAVENOUS at 12:20

## 2021-01-20 RX ADMIN — EPHEDRINE SULFATE 10 MG: 50 INJECTION, SOLUTION INTRAVENOUS at 12:01

## 2021-01-20 RX ADMIN — PROMETHAZINE HYDROCHLORIDE 12.5 MG: 12.5 TABLET ORAL at 15:26

## 2021-01-20 RX ADMIN — SODIUM CHLORIDE 1000 ML: 9 INJECTION, SOLUTION INTRAVENOUS at 09:46

## 2021-01-20 RX ADMIN — ACETAMINOPHEN 500 MG: 500 TABLET ORAL at 17:25

## 2021-01-20 RX ADMIN — ROCURONIUM BROMIDE 10 MG: 10 INJECTION INTRAVENOUS at 12:10

## 2021-01-20 RX ADMIN — SUGAMMADEX 200 MG: 100 INJECTION, SOLUTION INTRAVENOUS at 12:49

## 2021-01-20 RX ADMIN — ATORVASTATIN CALCIUM 40 MG: 40 TABLET, FILM COATED ORAL at 15:26

## 2021-01-20 RX ADMIN — FLUOXETINE HYDROCHLORIDE 40 MG: 20 CAPSULE ORAL at 20:32

## 2021-01-20 RX ADMIN — PROPOFOL 25 MCG/KG/MIN: 10 INJECTION, EMULSION INTRAVENOUS at 11:45

## 2021-01-20 RX ADMIN — CEFAZOLIN SODIUM 2 G: 2 INJECTION, SOLUTION INTRAVENOUS at 11:35

## 2021-01-20 RX ADMIN — HYDROMORPHONE HYDROCHLORIDE 0.5 MG: 1 INJECTION, SOLUTION INTRAMUSCULAR; INTRAVENOUS; SUBCUTANEOUS at 13:39

## 2021-01-20 RX ADMIN — METOPROLOL TARTRATE 100 MG: 100 TABLET, FILM COATED ORAL at 20:31

## 2021-01-20 RX ADMIN — FENTANYL CITRATE 50 MCG: 50 INJECTION, SOLUTION INTRAMUSCULAR; INTRAVENOUS at 13:13

## 2021-01-20 RX ADMIN — ROPINIROLE HYDROCHLORIDE 2 MG: 2 TABLET, FILM COATED ORAL at 20:32

## 2021-01-20 RX ADMIN — GABAPENTIN 100 MG: 100 CAPSULE ORAL at 20:31

## 2021-01-20 RX ADMIN — PROPOFOL 150 MG: 10 INJECTION, EMULSION INTRAVENOUS at 11:39

## 2021-01-20 RX ADMIN — HYDROMORPHONE HYDROCHLORIDE 0.5 MG: 1 INJECTION, SOLUTION INTRAMUSCULAR; INTRAVENOUS; SUBCUTANEOUS at 14:05

## 2021-01-20 RX ADMIN — DEXAMETHASONE SODIUM PHOSPHATE 4 MG: 10 INJECTION, SOLUTION INTRAMUSCULAR; INTRAVENOUS at 11:45

## 2021-01-20 RX ADMIN — HYDROMORPHONE HYDROCHLORIDE 2 MG: 2 TABLET ORAL at 23:29

## 2021-01-20 RX ADMIN — LISINOPRIL 5 MG: 5 TABLET ORAL at 15:26

## 2021-01-20 RX ADMIN — FENTANYL CITRATE 50 MCG: 50 INJECTION, SOLUTION INTRAMUSCULAR; INTRAVENOUS at 13:02

## 2021-01-20 NOTE — PLAN OF CARE
Problem: Adult Inpatient Plan of Care  Goal: Plan of Care Review  Outcome: Ongoing, Progressing  Flowsheets (Taken 1/20/2021 1434)  Progress: no change  Plan of Care Reviewed With: patient  Outcome Summary: patient admitted from PACU, VSS, on 2l NC, IS and flutter encouraged, fall precautions in place will continue to monitor   Goal Outcome Evaluation:  Plan of Care Reviewed With: patient  Progress: no change  Outcome Summary: patient admitted from PACU, VSS, on 2l NC, IS and flutter encouraged, fall precautions in place will continue to monitor

## 2021-01-20 NOTE — ANESTHESIA POSTPROCEDURE EVALUATION
Patient: Alee Wu    Procedure Summary     Date: 01/20/21 Room / Location:  [facility] OR 03 /  [facility]    Anesthesia Start: 1135 Anesthesia Stop: 1302    Procedures:       GASTRIC SLEEVE LAPAROSCOPIC (N/A Abdomen)      ESOPHAGOGASTRODUODENOSCOPY (N/A Esophagus) Diagnosis:       Morbid exogenous obesity (CMS/HCC)      (Morbid exogenous obesity (CMS/HCC) [E66.01])    Surgeon: Raymundo Mcnamara MD Provider: Travis Gill MD    Anesthesia Type: general with block ASA Status: 3          Anesthesia Type: general with block    Vitals  Vitals Value Taken Time   /65 01/20/21 1302   Temp 97.2 °F (36.2 °C) 01/20/21 1302   Pulse 64 01/20/21 1302   Resp 14 01/20/21 1302   SpO2 98 % 01/20/21 1302           Post Anesthesia Care and Evaluation    Patient location during evaluation: PACU  Patient participation: complete - patient participated  Level of consciousness: awake and alert  Pain management: adequate  Airway patency: patent  Anesthetic complications: No anesthetic complications  PONV Status: none  Cardiovascular status: hemodynamically stable and acceptable  Respiratory status: nonlabored ventilation, acceptable and nasal cannula  Hydration status: acceptable

## 2021-01-20 NOTE — OP NOTE
Preoperative Diagnosis:   Morbid Obesity with Multiple Co-Morbidities, NARANJO cirrhosis    Postoperative Diagnosis:   Same    Procedure:                                                      Laparoscopic Sleeve Gastrectomy (85% subtotal vertical gastrectomy)                                                                         EGD                                    Laparoscopic Ranjit-Cut liver biopsy x2                                                                   Surgeon:                                                       AISLINN Mcnamara MD    Asst:                         Bhanu Knight MD PGY-4    Anesthesia:                                                   GETA    EBL:                                                              Minimal    Fluids:                                                           Crystalloid    Specimens:                                                   Subtotal gastrectomy,TC liver bx x 2    Drains:                                                           None    Counts:                                                          Correct    Complications:                                               None    Indications:   This is a 55 year-old morbidly obese female who presents for elective higher risk laparoscopic sleeve gastrectomy.  She is aware if the varices varices seen on CT scan are too large I may back out of the procedure.  She's undergone our extensive preoperative education teaching and consent process everything's in order and she wishes to proceed.      Operative technique:     The patient was brought to the operating room, and placed supine upon the operating room table.  SCD hose were placed, she underwent uneventful general endotracheal anesthesia per the anesthesiology staff, she received IV Ancef and subcutaneous Lovenox, the anesthesiology staff performed a tap block, and her abdomen was prepped and draped with ChloraPrep in a sterile fashion prepping her  insulin pumps in the lower abdomen out of the field, an Ioban was used as well, a Gonzalez catheter was not placed.    The peritoneal cavity was entered in the high in the left midclavicular line using a 5 mm trocar and an Optiview technique and the abdomen was insufflated to a pressure of 15 mmHg with CO2 gas.  Exploratory laparoscopy revealed no evidence of injury from the entrance technique, a enlarged firm cirrhotic liver, omental adhesions in the lower midline pelvis with distended veins, minimal ascites noted, status post cholecystectomy.  Later in the case splenomegaly noted. Remaining trocars were placed under direct visualization including a 5 mm trocar in the left subcostal position, a 12 mm fascial splitting trocar  above and to the right of the umbilicus and across from this to the left of midline a 15 mm trocar.  Through a stab incision in the epigastrium a Molina retractor was used to elevate the firm enlarged left lobe of the liver.  I inspected the short gastrics and the area around the spleen and no significant varices noted.  Beginning approximately two thirds of the way around the greater curvature the stomach, the gastrocolic vessels were divided with the LigaSure device.  This proceeded proximally taking down all the short gastric vessels and exposing the left britt.  Once again the short gastric vessels were not distended, no variceal change noted.  Some minimal ascites noted in the splenic fossa.  There were no visible anterior or posterior hernias or lipomas and the hiatus was photodocumented.  Gastrocolic vessels were then divided medially to a few cm proximal to the pylorus.  Filmy attachments of the posterior stomach to the pancreas and retroperitoneum were divided.  The previously placed orogastric tube was positioned into the distal antrum.  The stomach was marked with a Kitner saturated with a marking pen 1 cm lateral to the angle of His, 3 cm away from the angularis, and 6 cm from the  pylorus.  The 22 cm bariatric standard clamp was then positioned just inside these markings. The 85% subtotal vertical sleeve gastrectomy was then performed using a [employer] articulating electronic linear stapler with dual absorbable strips.  The first firing was a 60 mm black load, the next 3 firings were 60 mm purple loads, and the final firing was a 45 mm purple load.  The OG and then the Standard clamp were removed.  The sleeve was performed such that it was uniform in size, no hourglassing or narrowing, especially at the angularis, and the final firing was done a centimeter away from the angle of His to hopefully avoid incorporating esophageal fibers.  The subtotal gastrectomy specimen was retrieved through the 15 mm trocar site incision, inspected, and sent unopened to pathology for permanent section.  It was a slightly larger than average size specimen.  The sleeve was submerged under saline.  Upper endoscopy was performed, and the endoscope was advanced into the duodenal bulb.  No air bubbles or leak seen, no bleeding at the staple line, no narrowing at the angularis, no pyloric spasm or deformity, no gastritis, no hiatal hernia or Chu's esophagus, no varices appreciated, and the endoscope was withdrawn.   Irrigation fluid was suctioned free.  The sleeve was resting nicely and hemostatic.  Incidentally no excessive oozing noted during the procedure.  The sleeve staple line was treated with 10 cc of aerosolized Tisseel fibrin glue.  Photodocumentation of the sleeve was obtained before endoscopy and then after endoscopy and Tisseel placement.  Fascia at the 15 mm trocar site incision was closed with a horizontal mattress 0 Vicryl suture placed with a suture passer under direct visualization and tying the knot extracorporeally.  Remaining trocars were removed under direct visualization, no bleeding noted from their sites.  Skin in each incision was closed using 3-0 Monocryl plus in an interrupted  subcuticular stitch followed by skin-a-fix.  The patient tolerated the procedure well without complication, was taken to the recovery room in stable condition.

## 2021-01-20 NOTE — BRIEF OP NOTE
GASTRIC SLEEVE LAPAROSCOPIC, ESOPHAGOGASTRODUODENOSCOPY  Progress Note    Alee Wu  1/20/2021    Pre-op Diagnosis:   Morbid exogenous obesity (CMS/HCC) [E66.01]       Post-Op Diagnosis Codes:     * Morbid exogenous obesity (CMS/HCC) [E66.01]    Procedure/CPT® Codes:  NY LAP, MARIE RESTRICT PROC, LONGITUDINAL GASTRECTOMY [96563]  NY ESOPHAGOGASTRODUODENOSCOPY TRANSORAL DIAGNOSTIC [81947]      Procedure(s):  GASTRIC SLEEVE LAPAROSCOPIC  ESOPHAGOGASTRODUODENOSCOPY    Surgeon(s):  Raymundo Mcnamara MD     Asst:  Bhanu Knight MD PGY-4    Anesthesia: General with Block    Staff:   Circulator: Renae Hicks RN; Lory Carey RN  Scrub Person: Thanh Rust  Nursing Assistant: Lulu Doshi; Maria E Gonzalez PCT         Estimated Blood Loss: minimal    Urine Voided: * No values recorded between 1/20/2021 11:34 AM and 1/20/2021 12:43 PM *    Specimens:                Specimens     ID Source Type Tests Collected By Collected At Frozen?      A Stomach Tissue · TISSUE PATHOLOGY EXAM   Raymundo Mcnamara MD 1/20/21 1228 No     Description: SUB-TOTAL GASTRECTOMY    This specimen was not marked as sent.    B Liver Tissue · TISSUE PATHOLOGY EXAM   Raymundo Mcnamara MD 1/20/21 1236      Description: Liver biopsy for permanent    This specimen was not marked as sent.                Drains: * No LDAs found *    Findings:     Complications: none          Raymundo Mcnamara MD     Date: 1/20/2021  Time: 12:43 EST

## 2021-01-20 NOTE — ANESTHESIA PREPROCEDURE EVALUATION
Anesthesia Evaluation                  Airway   Mallampati: II  Dental      Pulmonary    Cardiovascular     (+) hypertension, past MI  >12 months, CABG,       Neuro/Psych  GI/Hepatic/Renal/Endo    (+) obesity,   diabetes mellitus,     Musculoskeletal     Abdominal    Substance History      OB/GYN          Other                      Anesthesia Plan    ASA 3     general with block     intravenous induction     Anesthetic plan, all risks, benefits, and alternatives have been provided, discussed and informed consent has been obtained with: patient.

## 2021-01-20 NOTE — ANESTHESIA PROCEDURE NOTES
Airway  Urgency: elective    Date/Time: 1/20/2021 11:41 AM  Airway not difficult    General Information and Staff    Patient location during procedure: OR  CRNA: Kat Way CRNA    Indications and Patient Condition  Indications for airway management: airway protection    Preoxygenated: yes  MILS not maintained throughout  Mask difficulty assessment: 1 - vent by mask    Final Airway Details  Final airway type: endotracheal airway      Successful airway: ETT  Cuffed: yes   Successful intubation technique: video laryngoscopy  Endotracheal tube insertion site: oral  Blade: Thompson  Blade size: 3  ETT size (mm): 7.5  Cormack-Lehane Classification: grade I - full view of glottis  Placement verified by: chest auscultation and capnometry   Measured from: lips  ETT/EBT  to lips (cm): 20  Number of attempts at approach: 1  Assessment: lips, teeth, and gum same as pre-op and atraumatic intubation    Additional Comments  Negative epigastric sounds, Breath sound equal bilaterally with symmetric chest rise and fall    Grade III view with mac 3

## 2021-01-20 NOTE — ANESTHESIA PROCEDURE NOTES
4quad TAPs      Patient reassessed immediately prior to procedure    Patient location during procedure: OR  Reason for block: at surgeon's request and post-op pain management  Performed by  CRNA: Violetta Tong CRNA  Assisted by: Eldon Engel MD  Preanesthetic Checklist  Completed: patient identified, site marked, surgical consent, pre-op evaluation, timeout performed, IV checked, risks and benefits discussed and monitors and equipment checked  Prep:  Pt Position: supine  Sterile barriers:cap, gloves, sterile barriers and mask  Prep: ChloraPrep  Patient monitoring: blood pressure monitoring, continuous pulse oximetry and EKG  Procedure  Sedation:yes  Performed under: general  Guidance:ultrasound guided  Images:still images obtained, printed/placed on chart    Laterality:Bilateral  Block Type:TAP  Injection Technique:single-shot  Needle Type:short-bevel and echogenic  Needle Gauge:20 G  Resistance on Injection: none    Medications Used: dexamethasone sodium phosphate injection, 4 mg  bupivacaine PF (MARCAINE) 0.25 % injection, 60 mL  Med admintered at 1/20/2021 11:45 AM      Medications  Preservative Free Saline:10ml  Comment:Block Injection:  LA dose divided between Right and Left block        Post Assessment  Injection Assessment: negative aspiration for heme, incremental injection and no paresthesia on injection  Patient Tolerance:comfortable throughout block  Complications:no  Additional Notes      Under Ultrasound guidance, a BBraun 4inch 360 degree needle was advanced with Normal Saline hydro dissection of tissue.  The Internal Oblique and Transversus Abdominus muscles where visualized.  At or before the aponeurosis of Internal Oblique, local anesthetic spread was visualized in the Transversus Abdominus Plane. Injection was made incrementally with aspiration every 5 mls.  There was no  intravascular injection,  injection pressure was normal, there was no neural injection, and the procedure was completed  without difficulty.  Thank You.

## 2021-01-21 ENCOUNTER — APPOINTMENT (OUTPATIENT)
Dept: GENERAL RADIOLOGY | Facility: HOSPITAL | Age: 56
End: 2021-01-21

## 2021-01-21 LAB
ALBUMIN SERPL-MCNC: 3.6 G/DL (ref 3.5–5.2)
ALBUMIN/GLOB SERPL: 1.2 G/DL
ALP SERPL-CCNC: 84 U/L (ref 39–117)
ALT SERPL W P-5'-P-CCNC: 19 U/L (ref 1–33)
ANION GAP SERPL CALCULATED.3IONS-SCNC: 10 MMOL/L (ref 5–15)
AST SERPL-CCNC: 26 U/L (ref 1–32)
BASOPHILS # BLD AUTO: 0.02 10*3/MM3 (ref 0–0.2)
BASOPHILS NFR BLD AUTO: 0.2 % (ref 0–1.5)
BILIRUB SERPL-MCNC: 0.4 MG/DL (ref 0–1.2)
BUN SERPL-MCNC: 12 MG/DL (ref 6–20)
BUN/CREAT SERPL: 17.1 (ref 7–25)
CALCIUM SPEC-SCNC: 8.7 MG/DL (ref 8.6–10.5)
CHLORIDE SERPL-SCNC: 103 MMOL/L (ref 98–107)
CO2 SERPL-SCNC: 25 MMOL/L (ref 22–29)
CREAT SERPL-MCNC: 0.7 MG/DL (ref 0.57–1)
CYTO UR: NORMAL
DEPRECATED RDW RBC AUTO: 45.8 FL (ref 37–54)
EOSINOPHIL # BLD AUTO: 0.02 10*3/MM3 (ref 0–0.4)
EOSINOPHIL NFR BLD AUTO: 0.2 % (ref 0.3–6.2)
ERYTHROCYTE [DISTWIDTH] IN BLOOD BY AUTOMATED COUNT: 15.1 % (ref 12.3–15.4)
GFR SERPL CREATININE-BSD FRML MDRD: 87 ML/MIN/1.73
GLOBULIN UR ELPH-MCNC: 3 GM/DL
GLUCOSE BLDC GLUCOMTR-MCNC: 104 MG/DL (ref 70–130)
GLUCOSE BLDC GLUCOMTR-MCNC: 106 MG/DL (ref 70–130)
GLUCOSE BLDC GLUCOMTR-MCNC: 115 MG/DL (ref 70–130)
GLUCOSE BLDC GLUCOMTR-MCNC: 133 MG/DL (ref 70–130)
GLUCOSE SERPL-MCNC: 120 MG/DL (ref 65–99)
HCT VFR BLD AUTO: 34 % (ref 34–46.6)
HGB BLD-MCNC: 10.2 G/DL (ref 12–15.9)
IMM GRANULOCYTES # BLD AUTO: 0.02 10*3/MM3 (ref 0–0.05)
IMM GRANULOCYTES NFR BLD AUTO: 0.2 % (ref 0–0.5)
IRON 24H UR-MRATE: 47 MCG/DL (ref 37–145)
LAB AP CASE REPORT: NORMAL
LAB AP CLINICAL INFORMATION: NORMAL
LYMPHOCYTES # BLD AUTO: 1.8 10*3/MM3 (ref 0.7–3.1)
LYMPHOCYTES NFR BLD AUTO: 21.9 % (ref 19.6–45.3)
MCH RBC QN AUTO: 25.4 PG (ref 26.6–33)
MCHC RBC AUTO-ENTMCNC: 30 G/DL (ref 31.5–35.7)
MCV RBC AUTO: 84.6 FL (ref 79–97)
MONOCYTES # BLD AUTO: 0.45 10*3/MM3 (ref 0.1–0.9)
MONOCYTES NFR BLD AUTO: 5.5 % (ref 5–12)
NEUTROPHILS NFR BLD AUTO: 5.9 10*3/MM3 (ref 1.7–7)
NEUTROPHILS NFR BLD AUTO: 72 % (ref 42.7–76)
NRBC BLD AUTO-RTO: 0 /100 WBC (ref 0–0.2)
PATH REPORT.FINAL DX SPEC: NORMAL
PATH REPORT.GROSS SPEC: NORMAL
PLATELET # BLD AUTO: 136 10*3/MM3 (ref 140–450)
PMV BLD AUTO: 8.7 FL (ref 6–12)
POTASSIUM SERPL-SCNC: 3.9 MMOL/L (ref 3.5–5.2)
PROT SERPL-MCNC: 6.6 G/DL (ref 6–8.5)
RBC # BLD AUTO: 4.02 10*6/MM3 (ref 3.77–5.28)
SODIUM SERPL-SCNC: 138 MMOL/L (ref 136–145)
WBC # BLD AUTO: 8.21 10*3/MM3 (ref 3.4–10.8)

## 2021-01-21 PROCEDURE — 0 DIATRIZOATE MEGLUMINE & SODIUM PER 1 ML: Performed by: SURGERY

## 2021-01-21 PROCEDURE — 25010000002 ONDANSETRON PER 1 MG: Performed by: SURGERY

## 2021-01-21 PROCEDURE — 94799 UNLISTED PULMONARY SVC/PX: CPT

## 2021-01-21 PROCEDURE — 25010000002 NA FERRIC GLUC CPLX PER 12.5 MG: Performed by: SURGERY

## 2021-01-21 PROCEDURE — 80053 COMPREHEN METABOLIC PANEL: CPT | Performed by: SURGERY

## 2021-01-21 PROCEDURE — 25010000002 MORPHINE PER 10 MG: Performed by: SURGERY

## 2021-01-21 PROCEDURE — 99024 POSTOP FOLLOW-UP VISIT: CPT | Performed by: SURGERY

## 2021-01-21 PROCEDURE — 25010000002 HYDROMORPHONE 1 MG/ML SOLUTION: Performed by: SURGERY

## 2021-01-21 PROCEDURE — 83540 ASSAY OF IRON: CPT | Performed by: SURGERY

## 2021-01-21 PROCEDURE — 25010000002 ENOXAPARIN PER 10 MG: Performed by: SURGERY

## 2021-01-21 PROCEDURE — 74240 X-RAY XM UPR GI TRC 1CNTRST: CPT

## 2021-01-21 PROCEDURE — 85025 COMPLETE CBC W/AUTO DIFF WBC: CPT | Performed by: SURGERY

## 2021-01-21 PROCEDURE — 25010000002 CEFAZOLIN PER 500 MG: Performed by: SURGERY

## 2021-01-21 PROCEDURE — 25010000003 POTASSIUM CHLORIDE PER 2 MEQ: Performed by: SURGERY

## 2021-01-21 PROCEDURE — 82962 GLUCOSE BLOOD TEST: CPT

## 2021-01-21 PROCEDURE — 94660 CPAP INITIATION&MGMT: CPT

## 2021-01-21 PROCEDURE — 25010000002 CYANOCOBALAMIN PER 1000 MCG: Performed by: SURGERY

## 2021-01-21 RX ADMIN — ONDANSETRON 4 MG: 2 INJECTION INTRAMUSCULAR; INTRAVENOUS at 12:21

## 2021-01-21 RX ADMIN — ACETAMINOPHEN 500 MG: 500 TABLET ORAL at 14:40

## 2021-01-21 RX ADMIN — HYDROMORPHONE HYDROCHLORIDE 2 MG: 2 TABLET ORAL at 03:51

## 2021-01-21 RX ADMIN — GABAPENTIN 100 MG: 100 CAPSULE ORAL at 16:21

## 2021-01-21 RX ADMIN — HYDROMORPHONE HYDROCHLORIDE 2 MG: 2 TABLET ORAL at 08:50

## 2021-01-21 RX ADMIN — ISOSORBIDE MONONITRATE 60 MG: 60 TABLET, EXTENDED RELEASE ORAL at 10:20

## 2021-01-21 RX ADMIN — SODIUM CHLORIDE 125 MG: 9 INJECTION, SOLUTION INTRAVENOUS at 14:51

## 2021-01-21 RX ADMIN — ROPINIROLE HYDROCHLORIDE 2 MG: 2 TABLET, FILM COATED ORAL at 20:36

## 2021-01-21 RX ADMIN — POTASSIUM CHLORIDE AND SODIUM CHLORIDE 125 ML/HR: 450; 150 INJECTION, SOLUTION INTRAVENOUS at 12:21

## 2021-01-21 RX ADMIN — ONDANSETRON 4 MG: 2 INJECTION INTRAMUSCULAR; INTRAVENOUS at 05:41

## 2021-01-21 RX ADMIN — CEFAZOLIN 2 G: 10 INJECTION, POWDER, FOR SOLUTION INTRAVENOUS at 03:07

## 2021-01-21 RX ADMIN — GABAPENTIN 100 MG: 100 CAPSULE ORAL at 20:36

## 2021-01-21 RX ADMIN — ENOXAPARIN SODIUM 40 MG: 40 INJECTION SUBCUTANEOUS at 10:18

## 2021-01-21 RX ADMIN — Medication 30 ML: at 09:33

## 2021-01-21 RX ADMIN — FLUOXETINE HYDROCHLORIDE 40 MG: 20 CAPSULE ORAL at 10:20

## 2021-01-21 RX ADMIN — HYDROCODONE BITARTRATE AND ACETAMINOPHEN 1 TABLET: 7.5; 325 TABLET ORAL at 14:40

## 2021-01-21 RX ADMIN — MORPHINE SULFATE 4 MG: 4 INJECTION, SOLUTION INTRAMUSCULAR; INTRAVENOUS at 20:36

## 2021-01-21 RX ADMIN — LISINOPRIL 5 MG: 5 TABLET ORAL at 10:20

## 2021-01-21 RX ADMIN — GABAPENTIN 100 MG: 100 CAPSULE ORAL at 08:50

## 2021-01-21 RX ADMIN — CYANOCOBALAMIN 1000 MCG: 1000 INJECTION, SOLUTION INTRAMUSCULAR; SUBCUTANEOUS at 10:19

## 2021-01-21 RX ADMIN — ACETAMINOPHEN 500 MG: 500 TABLET ORAL at 20:36

## 2021-01-21 RX ADMIN — METOPROLOL TARTRATE 100 MG: 100 TABLET, FILM COATED ORAL at 10:20

## 2021-01-21 RX ADMIN — HYDROMORPHONE HYDROCHLORIDE 2 MG: 2 TABLET ORAL at 12:31

## 2021-01-21 RX ADMIN — FLUOXETINE HYDROCHLORIDE 40 MG: 20 CAPSULE ORAL at 20:36

## 2021-01-21 RX ADMIN — ATORVASTATIN CALCIUM 40 MG: 40 TABLET, FILM COATED ORAL at 10:20

## 2021-01-21 RX ADMIN — PANTOPRAZOLE SODIUM 40 MG: 40 INJECTION, POWDER, FOR SOLUTION INTRAVENOUS at 05:41

## 2021-01-21 RX ADMIN — HYDROMORPHONE HYDROCHLORIDE 1 MG: 1 INJECTION, SOLUTION INTRAMUSCULAR; INTRAVENOUS; SUBCUTANEOUS at 16:21

## 2021-01-21 RX ADMIN — ZOLPIDEM TARTRATE 5 MG: 5 TABLET ORAL at 20:36

## 2021-01-21 RX ADMIN — ACETAMINOPHEN 500 MG: 500 TABLET ORAL at 05:41

## 2021-01-21 RX ADMIN — HYDROCODONE BITARTRATE AND ACETAMINOPHEN 1 TABLET: 7.5; 325 TABLET ORAL at 03:07

## 2021-01-21 NOTE — PROGRESS NOTES
Discharge Planning Assessment  [facility]     Patient Name: Alee Wu  MRN: [id number]  Today's Date: 1/21/2021    Admit Date: 1/20/2021    Discharge Needs Assessment     Row Name 01/21/21 1249       Living Environment    Lives With  significant other pt resides in [place]    Name(s) of Who Lives With Patient  CHAIM Delvalle    Current Living Arrangements  home/apartment/condo    Primary Care Provided by  self    Provides Primary Care For  no one    Family Caregiver if Needed  significant other;child(salo), adult    Family Caregiver Names  S.BETO Lezama and daughters    Quality of Family Relationships  helpful;involved;supportive    Able to Return to Prior Arrangements  yes       Transition Planning    Patient/Family Anticipates Transition to  home with family    Patient/Family Anticipated Services at Transition  none    Transportation Anticipated  family or friend will provide       Discharge Needs Assessment    Readmission Within the Last 30 Days  no previous admission in last 30 days    Equipment Currently Used at Home  cpap;glucometer    Concerns to be Addressed  denies needs/concerns at this time;discharge planning    Anticipated Changes Related to Illness  none    Equipment Needed After Discharge  none    Provided Post Acute Provider List?  N/A    Provided Post Acute Provider Quality & Resource List?  N/A        Discharge Plan     Row Name 01/21/21 1250       Plan    Plan  home    Patient/Family in Agreement with Plan  yes    Plan Comments  CM spoke with pt at bedside. Pt resides in [place] with her CHAIM Lezama. Pt reports she is independent of adls and has a CPAP and glucometer at home. Pt reports she is not current with home health or outpatient medical services. Pt plans to retrurn home and denies needs at this time. CM will continue to follow.    Final Discharge Disposition Code  01 - home or self-care        Continued Care and Services - Admitted Since 1/20/2021    Coordination has not been started  for this encounter.         Demographic Summary     Row Name 01/21/21 1247       General Information    Referral Source  admission list    Reason for Consult  discharge planning    Preferred Language  English     Used During This Interaction  no    General Information Comments  PCP- Curt Michaud       Contact Information    Permission Granted to Share Info With          Functional Status     Row Name 01/21/21 1248       Functional Status    Usual Activity Tolerance  good    Current Activity Tolerance  moderate       Functional Status, IADL    Medications  independent    Meal Preparation  independent    Housekeeping  independent    Laundry  independent    Shopping  independent       Mental Status Summary    Recent Changes in Mental Status/Cognitive Functioning  no changes       Employment/    Employment/ Comments  Pt has [place] [employer], denies concerns or disruption in coverage. Pt has prescription drug coverage and denies issues obtaining or affording current medications.        Psychosocial    No documentation.       Abuse/Neglect    No documentation.       Legal    No documentation.       Substance Abuse    No documentation.       Patient Forms    No documentation.           Kika Jenkins RN

## 2021-01-21 NOTE — PROGRESS NOTES
"Cc: POD#1 LSG  \"sore\"    Her  is in the room.  She complains of a lot of pain not well controlled with oral medication which does not last long enough.  Otherwise she is doing well.  She is tolerating her protein shake with minimal nausea.  She says she is ambulating well in the halls and voiding a lot.  No pulmonary complaints, spirometer 2000.  She had a bowel movement with no blood, not passing flatus.  No fever or tachycardia pulse 52 respirations 16 blood pressure 119/63 UO 1675 - 400 she is in no apparent distress.  Lungs clear to auscultation.  Heart regular rate and rhythm with loud murmur.  Abdomen soft, nontender/appropriate, nondistended, bowel sounds present.  Wounds look okay.  No ecchymosis  CMP normal except for glucose of 120.  Iron normal at 47.  White blood count normal at 8.21 with a normal differential.  H&H 10.2 and 34 with low MCH and MCHC.  Hemoglobin A1c elevated 8.60 upper GI images reviewed and unremarkable, report pending    Impression: Doing okay postop day #1 laparoscopic sleeve gastrectomy and liver biopsy.  Pain not well controlled with oral medication.  Poorly controlled non-insulin-dependent diabetes mellitus with improved hemoglobin A1c from 6/23/2020    Plan: Continue liquids, out of bed, pulmonary toilet, VTE prophylaxis.  Decrease IV fluids.  Anticipate discharge tomorrow.  "

## 2021-01-21 NOTE — PLAN OF CARE
Goal Outcome Evaluation:  Plan of Care Reviewed With: patient  Progress: no change   A/Ox4; VSS; currently on room air; and NSR on the monitor. Pt chewing gum, using IS, and ambulating per ERAS protocol. Pt C/O pain and prn medication administered per order. Will continue to monitor.

## 2021-01-22 VITALS
SYSTOLIC BLOOD PRESSURE: 108 MMHG | WEIGHT: 222.5 LBS | OXYGEN SATURATION: 92 % | DIASTOLIC BLOOD PRESSURE: 57 MMHG | BODY MASS INDEX: 35.76 KG/M2 | HEIGHT: 66 IN | HEART RATE: 66 BPM | RESPIRATION RATE: 16 BRPM | TEMPERATURE: 99.1 F

## 2021-01-22 LAB
ALBUMIN SERPL-MCNC: 3.6 G/DL (ref 3.5–5.2)
ALBUMIN/GLOB SERPL: 1 G/DL
ALP SERPL-CCNC: 92 U/L (ref 39–117)
ALT SERPL W P-5'-P-CCNC: 16 U/L (ref 1–33)
ANION GAP SERPL CALCULATED.3IONS-SCNC: 12 MMOL/L (ref 5–15)
AST SERPL-CCNC: 26 U/L (ref 1–32)
BASOPHILS # BLD AUTO: 0.02 10*3/MM3 (ref 0–0.2)
BASOPHILS NFR BLD AUTO: 0.2 % (ref 0–1.5)
BILIRUB SERPL-MCNC: 0.7 MG/DL (ref 0–1.2)
BUN SERPL-MCNC: 16 MG/DL (ref 6–20)
BUN/CREAT SERPL: 21.3 (ref 7–25)
CALCIUM SPEC-SCNC: 9 MG/DL (ref 8.6–10.5)
CHLORIDE SERPL-SCNC: 101 MMOL/L (ref 98–107)
CO2 SERPL-SCNC: 24 MMOL/L (ref 22–29)
CREAT SERPL-MCNC: 0.75 MG/DL (ref 0.57–1)
DEPRECATED RDW RBC AUTO: 46.8 FL (ref 37–54)
EOSINOPHIL # BLD AUTO: 0.2 10*3/MM3 (ref 0–0.4)
EOSINOPHIL NFR BLD AUTO: 2.1 % (ref 0.3–6.2)
ERYTHROCYTE [DISTWIDTH] IN BLOOD BY AUTOMATED COUNT: 15.9 % (ref 12.3–15.4)
GFR SERPL CREATININE-BSD FRML MDRD: 81 ML/MIN/1.73
GLOBULIN UR ELPH-MCNC: 3.5 GM/DL
GLUCOSE BLDC GLUCOMTR-MCNC: 104 MG/DL (ref 70–130)
GLUCOSE BLDC GLUCOMTR-MCNC: 112 MG/DL (ref 70–130)
GLUCOSE SERPL-MCNC: 127 MG/DL (ref 65–99)
HCT VFR BLD AUTO: 34.7 % (ref 34–46.6)
HGB BLD-MCNC: 10.2 G/DL (ref 12–15.9)
IMM GRANULOCYTES # BLD AUTO: 0.03 10*3/MM3 (ref 0–0.05)
IMM GRANULOCYTES NFR BLD AUTO: 0.3 % (ref 0–0.5)
LYMPHOCYTES # BLD AUTO: 1.86 10*3/MM3 (ref 0.7–3.1)
LYMPHOCYTES NFR BLD AUTO: 19.6 % (ref 19.6–45.3)
MCH RBC QN AUTO: 24.3 PG (ref 26.6–33)
MCHC RBC AUTO-ENTMCNC: 29.4 G/DL (ref 31.5–35.7)
MCV RBC AUTO: 82.6 FL (ref 79–97)
MONOCYTES # BLD AUTO: 0.61 10*3/MM3 (ref 0.1–0.9)
MONOCYTES NFR BLD AUTO: 6.4 % (ref 5–12)
NEUTROPHILS NFR BLD AUTO: 6.77 10*3/MM3 (ref 1.7–7)
NEUTROPHILS NFR BLD AUTO: 71.4 % (ref 42.7–76)
NRBC BLD AUTO-RTO: 0 /100 WBC (ref 0–0.2)
PLATELET # BLD AUTO: 153 10*3/MM3 (ref 140–450)
PMV BLD AUTO: 9.1 FL (ref 6–12)
POTASSIUM SERPL-SCNC: 3.7 MMOL/L (ref 3.5–5.2)
PROT SERPL-MCNC: 7.1 G/DL (ref 6–8.5)
RBC # BLD AUTO: 4.2 10*6/MM3 (ref 3.77–5.28)
SODIUM SERPL-SCNC: 137 MMOL/L (ref 136–145)
WBC # BLD AUTO: 9.49 10*3/MM3 (ref 3.4–10.8)

## 2021-01-22 PROCEDURE — 25010000002 ENOXAPARIN PER 10 MG: Performed by: SURGERY

## 2021-01-22 PROCEDURE — 85025 COMPLETE CBC W/AUTO DIFF WBC: CPT | Performed by: SURGERY

## 2021-01-22 PROCEDURE — 99024 POSTOP FOLLOW-UP VISIT: CPT | Performed by: SURGERY

## 2021-01-22 PROCEDURE — 80053 COMPREHEN METABOLIC PANEL: CPT | Performed by: SURGERY

## 2021-01-22 PROCEDURE — 25010000002 MORPHINE PER 10 MG: Performed by: SURGERY

## 2021-01-22 PROCEDURE — 82962 GLUCOSE BLOOD TEST: CPT

## 2021-01-22 RX ORDER — OXYCODONE HYDROCHLORIDE 10 MG/1
10 TABLET ORAL EVERY 6 HOURS PRN
Qty: 20 TABLET | Refills: 0 | Status: SHIPPED | OUTPATIENT
Start: 2021-01-22 | End: 2021-04-19

## 2021-01-22 RX ORDER — OMEPRAZOLE 40 MG/1
40 CAPSULE, DELAYED RELEASE ORAL DAILY
Qty: 60 CAPSULE | Refills: 0 | Status: SHIPPED | OUTPATIENT
Start: 2021-01-22 | End: 2021-03-23

## 2021-01-22 RX ORDER — ONDANSETRON 4 MG/1
4 TABLET, ORALLY DISINTEGRATING ORAL EVERY 8 HOURS PRN
Qty: 10 TABLET | Refills: 0 | Status: SHIPPED | OUTPATIENT
Start: 2021-01-22 | End: 2021-04-19

## 2021-01-22 RX ADMIN — ENOXAPARIN SODIUM 40 MG: 40 INJECTION SUBCUTANEOUS at 09:48

## 2021-01-22 RX ADMIN — PANTOPRAZOLE SODIUM 40 MG: 40 INJECTION, POWDER, FOR SOLUTION INTRAVENOUS at 05:15

## 2021-01-22 RX ADMIN — OXYCODONE 5 MG: 5 TABLET ORAL at 11:58

## 2021-01-22 RX ADMIN — ACETAMINOPHEN 500 MG: 500 TABLET ORAL at 14:08

## 2021-01-22 RX ADMIN — ISOSORBIDE MONONITRATE 60 MG: 60 TABLET, EXTENDED RELEASE ORAL at 09:47

## 2021-01-22 RX ADMIN — GABAPENTIN 100 MG: 100 CAPSULE ORAL at 09:46

## 2021-01-22 RX ADMIN — HYDROCODONE BITARTRATE AND ACETAMINOPHEN 1 TABLET: 7.5; 325 TABLET ORAL at 02:21

## 2021-01-22 RX ADMIN — ACETAMINOPHEN 500 MG: 500 TABLET ORAL at 05:15

## 2021-01-22 RX ADMIN — METOPROLOL TARTRATE 100 MG: 100 TABLET, FILM COATED ORAL at 09:47

## 2021-01-22 RX ADMIN — LISINOPRIL 5 MG: 5 TABLET ORAL at 09:47

## 2021-01-22 RX ADMIN — HYDROCODONE BITARTRATE AND ACETAMINOPHEN 1 TABLET: 7.5; 325 TABLET ORAL at 09:54

## 2021-01-22 RX ADMIN — CETIRIZINE HYDROCHLORIDE 10 MG: 10 TABLET, FILM COATED ORAL at 09:46

## 2021-01-22 RX ADMIN — FLUOXETINE HYDROCHLORIDE 40 MG: 20 CAPSULE ORAL at 09:46

## 2021-01-22 RX ADMIN — ATORVASTATIN CALCIUM 40 MG: 40 TABLET, FILM COATED ORAL at 09:46

## 2021-01-22 RX ADMIN — MORPHINE SULFATE 4 MG: 4 INJECTION, SOLUTION INTRAMUSCULAR; INTRAVENOUS at 07:18

## 2021-01-22 RX ADMIN — HYDROMORPHONE HYDROCHLORIDE 2 MG: 2 TABLET ORAL at 05:15

## 2021-01-22 NOTE — PROGRESS NOTES
"Bariatric Surgery     LOS: 2 days   Patient Care Team:  Curt Michaud DO as PCP - General (Family Medicine)    Chief Complaint:  POD #2    Subjective     Interval History:  Doing well.  No complaints.  Tolerating PO. Denies N/V.  No fevers.  Pain controlled.  Ambulating.  Voiding.  IS 1700 observed with technique corrected to take a slower breath.  Feels ready for discharge.  Normoglycemic.    Objective     Vital Signs  Blood pressure 130/58, pulse 68, temperature 99 °F (37.2 °C), temperature source Oral, resp. rate 16, height 166.4 cm (65.5\"), weight 101 kg (222 lb 8 oz), SpO2 92 %.    Physical Exam:  General: Alert, NAD  Lungs: Clear  Heart: RRR  Abdomen: soft, appropriate, incisions okay  Extremities: warm, (+) SCDs     Results Review:     I reviewed the patient's new clinical results.    Labs:  Lab Results (last 24 hours)     Procedure Component Value Units Date/Time    POC Glucose Once [[id number]]  (Normal) Collected: 01/22/21 1148    Specimen: Blood Updated: 01/22/21 1204     Glucose 104 mg/dL     POC Glucose Once [[id number]]  (Normal) Collected: 01/22/21 0700    Specimen: Blood Updated: 01/22/21 0701     Glucose 112 mg/dL     Comprehensive Metabolic Panel [[id number]]  (Abnormal) Collected: 01/22/21 0236    Specimen: Blood Updated: 01/22/21 0412     Glucose 127 mg/dL      BUN 16 mg/dL      Creatinine 0.75 mg/dL      Sodium 137 mmol/L      Potassium 3.7 mmol/L      Chloride 101 mmol/L      CO2 24.0 mmol/L      Calcium 9.0 mg/dL      Total Protein 7.1 g/dL      Albumin 3.60 g/dL      ALT (SGPT) 16 U/L      AST (SGOT) 26 U/L      Alkaline Phosphatase 92 U/L      Total Bilirubin 0.7 mg/dL      eGFR Non African Amer 81 mL/min/1.73      Globulin 3.5 gm/dL      A/G Ratio 1.0 g/dL      BUN/Creatinine Ratio 21.3     Anion Gap 12.0 mmol/L     Narrative:      GFR Normal >60  Chronic Kidney Disease <60  Kidney Failure <15      CBC & Differential [[id number]]  (Abnormal) Collected: 01/22/21 0237    Specimen: Blood " Updated: 01/22/21 0356    Narrative:      The following orders were created for panel order CBC & Differential.  Procedure                               Abnormality         Status                     ---------                               -----------         ------                     CBC Auto Differential[[id number]]        Abnormal            Final result                 Please view results for these tests on the individual orders.    CBC Auto Differential [[id number]]  (Abnormal) Collected: 01/22/21 0237    Specimen: Blood Updated: 01/22/21 0356     WBC 9.49 10*3/mm3      RBC 4.20 10*6/mm3      Hemoglobin 10.2 g/dL      Hematocrit 34.7 %      MCV 82.6 fL      MCH 24.3 pg      MCHC 29.4 g/dL      RDW 15.9 %      RDW-SD 46.8 fl      MPV 9.1 fL      Platelets 153 10*3/mm3      Neutrophil % 71.4 %      Lymphocyte % 19.6 %      Monocyte % 6.4 %      Eosinophil % 2.1 %      Basophil % 0.2 %      Immature Grans % 0.3 %      Neutrophils, Absolute 6.77 10*3/mm3      Lymphocytes, Absolute 1.86 10*3/mm3      Monocytes, Absolute 0.61 10*3/mm3      Eosinophils, Absolute 0.20 10*3/mm3      Basophils, Absolute 0.02 10*3/mm3      Immature Grans, Absolute 0.03 10*3/mm3      nRBC 0.0 /100 WBC     POC Glucose Once [[id number]]  (Normal) Collected: 01/21/21 2029    Specimen: Blood Updated: 01/21/21 2031     Glucose 106 mg/dL     Tissue Pathology Exam [[id number]] Collected: 01/20/21 1228    Specimen: Tissue from Stomach, Tissue from Liver Updated: 01/21/21 1645     Case Report --     Surgical Pathology Report                         Case: [id number]                                  Authorizing Provider:  Raymundo Mcnamara MD    Collected:           01/20/2021 12:28 PM          Ordering Location:     [facility]   Received:            01/20/2021 01:27 PM                                 OR                                                                           Pathologist:           Holly Flores MD                                                         Specimens:   1) - Stomach, SUB-TOTAL GASTRECTOMY                                                                 2) - Liver, Liver biopsy for permanent                                                      Clinical Information --     Morbid exogenous obesity.       Final Diagnosis --     1.  STOMACH, PARTIAL GASTRECTOMY:                      Mild reactive gastropathy with foveolar hyperplasia and superficial vascular congestion        Proton pump inhibitor effect                Negative for Helicobacter pylori organisms on H&E stain                      Negative for intestinal metaplasia, dysplasia and malignancy  2.  LIVER, CORE NEEDLE BIOPSY:        Cirrhosis        Steatohepatitis; grade 1, stage 4 (Brunt's)       Gross Description --     Specimen one is received in formalin labeled sub-total gastrectomy and consists of a 22.3 x 4 x 3.1 cm intact, unoriented partial gastrectomy specimen with a scant amount of attached fat and a staple line along the entire length of the specimen.  The serosa is tan-pink finely vascularized and moderately hemorrhagic.  The lumen contains an abundant amount of gelatinous, hemorrhagic material and the wall averages 0.2 cm in thickness.  The mucosa is tan-red, mildly erythematous, and displays a grossly normal rugal folding without notable mass-forming lesions or ulcerations.  Representative sections are submitted in 3 cassettes.       Specimen 2 is received in formalin labeled liver biopsy and consists of 2 cylindrical fragments of tan soft tissue averaging 1.2 cm in length by less than 0.1 cm in diameter which are submitted entirely and intact in block 2A.         Microscopic Description --     The slides are reviewed and demonstrate histopathologic features supporting the above rendered diagnosis.  Core needle biopsy of the liver shows 12 portal tracts available for review.  The portal tracts are widened by bands of fibrosis and scattered  inflammation.  No significant interface activity.  There is ductular proliferation present as well.  There are bridging bands of fibrosis.  Cirrhotic nodules present in the core needle biopsy.  The inflammatory infiltrate present in the bands consists of predominantly small lymphocytes, a rare plasma cell; no significant eosinophils are seen.  The lobules contain a mixed steatosis (variable 40-60%).  There is ballooning/feathery degeneration.  No Carlee bodies are seen.  No acidophil bodies are appreciated.  Special stains, were performed, with adequate controls.  There is no increased iron deposition by Prussian blue iron stain.  Trichrome reveals thick fibers bands between nodules.      POC Glucose Once [[id number]]  (Normal) Collected: 01/21/21 1616    Specimen: Blood Updated: 01/21/21 1618     Glucose 104 mg/dL           Imaging:  Imaging Results (Last 24 Hours)     Procedure Component Value Units Date/Time    FL Upper GI Single Contrast With KUB [[id number]] Collected: 01/21/21 1612     Updated: 01/21/21 1631    Narrative:      EXAMINATION: FL UPPER GI SINGLE CONTRAST W KUB-     INDICATION: sleeve water soluble; E66.01-Morbid (severe) obesity due to  excess calories     TECHNIQUE: 20 seconds of fluoroscopic time was used for this exam. 7  associated fluoroscopic series were saved.  imaging reveals a  nonobstructive bowel gas pattern. There is a suture line visible in the  left upper quadrant. Surgical clips are visible in the gallbladder  fossa.     COMPARISON: NONE     FINDINGS: Under fluoroscopic observation, the patient ingested  water-soluble contrast. The oral phase of deglutition appeared normal.  The esophageal mucosa appeared grossly normal. There was no evidence of  a focal esophageal stricture. Examination of the stomach demonstrated  postoperative changes that are consistent with a vertical sleeve  gastrectomy. No extravasation of contrast was seen. The gastric folds  and gastric mucosa appeared  grossly normal. There was no evidence of a  gastric or duodenal ulcer. There was no delay in gastric emptying. The  duodenal bulb and duodenal C-loop appeared grossly normal.          Impression:      Status post vertical sleeve gastrectomy. There was no  evidence of extraluminal contrast. There was no delay in gastric  emptying.         This report was finalized on 1/21/2021 4:28 PM by Dr. Tracey Pino MD.               Assessment/Plan     POD # 2 s/p LSG/ liver bx.  Hx notable for cirrhosis.    Doing well.  Hgb stable. Patient feels well and has met discharge criteria.  Will discharge home with follow up in 1 week with PA.  Rx given for oxycodone 10, Zofran, PPI.  Hold home antihyperglycemics.  Restart antiplatelets 2 weeks post op as discussed preop with Dr. Mcnamara for hx of CAD with stents.   Discharge instructions reviewed with patient and significant other and all questions answered.      Of note, on Norco 7.5 3x daily at home for chronic pain.  She is out of this prescription.  I counseled her that I could rx 3-4 days of oxycodone only, but that she must contact the doctor who regularly prescribes her the [facility] for further narcotics.  She and significant other both voiced agreement and understanding.          Nica Huertas MD  01/22/21  13:37 EST

## 2021-01-22 NOTE — PLAN OF CARE
Goal Outcome Evaluation:  Plan of Care Reviewed With: patient  Progress: no change  Outcome Summary: Pain improved per pt. VSS. RA. Tolerating protein and completing all goals. Will discharge home.

## 2021-01-22 NOTE — PLAN OF CARE
Problem: Adult Inpatient Plan of Care  Goal: Plan of Care Review  Outcome: Ongoing, Progressing  Flowsheets (Taken 1/21/2021 1859)  Progress: improving  Plan of Care Reviewed With: patient  Outcome Summary: Pt VSS. Some nausea with po intake of protein. Has improved as day has progress. Moderate to severe pain during day, requiring iv pain meds this evening.  Goal: Patient-Specific Goal (Individualized)  Outcome: Ongoing, Progressing  Goal: Absence of Hospital-Acquired Illness or Injury  Outcome: Ongoing, Progressing  Intervention: Identify and Manage Fall Risk  Recent Flowsheet Documentation  Taken 1/21/2021 1800 by Chantal Angelo RN  Safety Promotion/Fall Prevention:   activity supervised   fall prevention program maintained   nonskid shoes/slippers when out of bed   safety round/check completed  Taken 1/21/2021 1600 by Chantal Angelo RN  Safety Promotion/Fall Prevention:   activity supervised   fall prevention program maintained   nonskid shoes/slippers when out of bed   safety round/check completed  Taken 1/21/2021 1400 by Chantal Angelo RN  Safety Promotion/Fall Prevention:   activity supervised   fall prevention program maintained   nonskid shoes/slippers when out of bed   safety round/check completed  Taken 1/21/2021 1200 by Chantal Angelo RN  Safety Promotion/Fall Prevention:   activity supervised   fall prevention program maintained   nonskid shoes/slippers when out of bed   safety round/check completed  Taken 1/21/2021 1015 by Chantal Angelo RN  Safety Promotion/Fall Prevention:   activity supervised   fall prevention program maintained   nonskid shoes/slippers when out of bed   safety round/check completed  Taken 1/21/2021 0800 by Chantal Angelo RN  Safety Promotion/Fall Prevention:   activity supervised   fall prevention program maintained   nonskid shoes/slippers when out of bed   safety round/check completed  Intervention: Prevent Skin Injury  Recent Flowsheet Documentation  Taken 1/21/2021 1800 by  Chantal Angelo RN  Body Position: sitting up in bed  Taken 1/21/2021 1600 by Chantal Angelo RN  Body Position: sitting up in bed  Taken 1/21/2021 1400 by Chantal Angelo RN  Body Position: sitting up in bed  Taken 1/21/2021 1200 by Chantal Angelo RN  Body Position: sitting up in bed  Taken 1/21/2021 1015 by Chantal Angelo RN  Body Position: sitting up in bed  Taken 1/21/2021 0800 by Chantal Angelo RN  Body Position: sitting up in bed  Intervention: Prevent and Manage VTE (venous thromboembolism) Risk  Recent Flowsheet Documentation  Taken 1/21/2021 0800 by Chantal Angelo RN  VTE Prevention/Management:   bilateral   dorsiflexion/plantar flexion performed   sequential compression devices on  Goal: Optimal Comfort and Wellbeing  Outcome: Ongoing, Progressing  Intervention: Provide Person-Centered Care  Recent Flowsheet Documentation  Taken 1/21/2021 0800 by Chantal Angelo RN  Trust Relationship/Rapport:   care explained   thoughts/feelings acknowledged  Goal: Readiness for Transition of Care  Outcome: Ongoing, Progressing   Goal Outcome Evaluation:  Plan of Care Reviewed With: patient  Progress: improving  Outcome Summary: Pt VSS. Some nausea with po intake of protein. Has improved as day has progress. Moderate to severe pain during day, requiring iv pain meds this evening.

## 2021-01-22 NOTE — PLAN OF CARE
Goal Outcome Evaluation:  Plan of Care Reviewed With: patient  Progress: no change   A/Ox4; VSS; NSR to sinus bradycardia on the monitor; and currently on room air while awake and CPAP when sleeping. Pt C/O pain and PRN medications administered. Pt chewing gum, using IS, and ambulating per ERAS protocol. Protein intake improved this shift per pt (Approximately 1 and half containers). Will continue to monitor.

## 2021-01-23 ENCOUNTER — READMISSION MANAGEMENT (OUTPATIENT)
Dept: CALL CENTER | Facility: HOSPITAL | Age: 56
End: 2021-01-23

## 2021-01-23 NOTE — OUTREACH NOTE
Prep Survey      Responses   [facility] facility patient discharged from?  [place]   Is LACE score < 7 ?  No   Emergency Room discharge w/ pulse ox?  No   Eligibility  Readm Mgmt   Discharge diagnosis  Morbid exogenous obesity    Does the patient have one of the following disease processes/diagnoses(primary or secondary)?  General Surgery   Does the patient have Home health ordered?  No   Is there a DME ordered?  No   Prep survey completed?  Yes          Sandra Sanders RN

## 2021-01-25 ENCOUNTER — TELEPHONE (OUTPATIENT)
Dept: BARIATRICS/WEIGHT MGMT | Facility: CLINIC | Age: 56
End: 2021-01-25

## 2021-01-25 NOTE — TELEPHONE ENCOUNTER
Pt called in stating that one of her incisions is leaking, (forgot to ask which one). Pt is going to send in picture as well. Pt stated that it is leaking yellowish, blood tinged. Kind of painful, but not as bad as sat and sun. Pt stated that on sat and sun, it was red, hard, painful, and felt hot to the touch. Pt stated that she has an appt on Wed, but needs to see if she needs to be seen sooner.  Please advise, thank you.

## 2021-01-25 NOTE — TELEPHONE ENCOUNTER
Notified pt that you looked at her pictures, I let her know that you said they look okay.  I told her to keep incisions clean and dry, and it is ok to cover w/ a bandage if leaking.  Told pt that she is welcome to come into office sooner than scheduled if desired, and to let us know if worsens.  Pt verbalized understanding, and stated that she will keep her appt for Wednesday, and contact us if her incisions worsen.

## 2021-01-26 ENCOUNTER — READMISSION MANAGEMENT (OUTPATIENT)
Dept: CALL CENTER | Facility: HOSPITAL | Age: 56
End: 2021-01-26

## 2021-01-26 NOTE — OUTREACH NOTE
General Surgery Week 1 Survey      Responses   [facility] operated by [facility] patient discharged from?  [place]   Does the patient have one of the following disease processes/diagnoses(primary or secondary)?  General Surgery   Week 1 attempt successful?  No   Unsuccessful attempts  Attempt 1          Eldon Sullivan RN

## 2021-01-26 NOTE — DISCHARGE SUMMARY
Admission Diagnosis:   Morbid Obesity with Multiple Co-morbidities, Barker cirrhosis    Discharge Diagnosis:  Same    Principal Procedure Performed:   Laparoscopic sleeve gastrectomy,  EGD, Ranjit-Cut liver biopsy x2    Other procedures performed: Upper GI    Complications: None    Consultations: None    History of present illness:  This is a 55-year-old morbidly obese patient who presents for elective laparoscopic sleeve gastrectomy.  The preoperative testing and evaluation is in order and the patient is admitted for elective surgery.    Hospital Course:  The patient was admitted and underwent uneventful surgery as described.  Postoperatively the patient was transferred to the bariatric telemetry unit. Upper GI on postoperative day #1 was unremarkable.  A lot of pain not controlled with oral narcotics on POD#1.  Hemoglobin A1c 8.60.  At the time of discharge on postoperative day #2 the patient is tolerating a diet and pain is controlled with oral medication.  The patient is afebrile and abdominal exam is appropriate, wounds look okay.  Pathology of the liver return cirrhosis and steatohepatitis grade 1 stage IV (Brunt's).  The patient is discharged home in good condition.  Discharge instructions were discussed.  We will send a copy of the path to the patient's GI Dr. Fernández.  Patient instructed to avoid high fructose corn syrup.  The medication reconciliation has been completed.  The patient is to follow-up in 1-2 weeks in the office.

## 2021-01-27 ENCOUNTER — OFFICE VISIT (OUTPATIENT)
Dept: BARIATRICS/WEIGHT MGMT | Facility: CLINIC | Age: 56
End: 2021-01-27

## 2021-01-27 ENCOUNTER — TELEPHONE (OUTPATIENT)
Dept: BARIATRICS/WEIGHT MGMT | Facility: CLINIC | Age: 56
End: 2021-01-27

## 2021-01-27 ENCOUNTER — READMISSION MANAGEMENT (OUTPATIENT)
Dept: CALL CENTER | Facility: HOSPITAL | Age: 56
End: 2021-01-27

## 2021-01-27 VITALS
DIASTOLIC BLOOD PRESSURE: 58 MMHG | WEIGHT: 211.5 LBS | RESPIRATION RATE: 18 BRPM | HEIGHT: 66 IN | OXYGEN SATURATION: 99 % | SYSTOLIC BLOOD PRESSURE: 118 MMHG | TEMPERATURE: 97.6 F | HEART RATE: 74 BPM | BODY MASS INDEX: 33.99 KG/M2

## 2021-01-27 DIAGNOSIS — R10.11 RUQ ABDOMINAL PAIN: ICD-10-CM

## 2021-01-27 DIAGNOSIS — Z98.84 S/P BARIATRIC SURGERY: Primary | ICD-10-CM

## 2021-01-27 DIAGNOSIS — K74.60 CIRRHOSIS OF LIVER WITHOUT ASCITES, UNSPECIFIED HEPATIC CIRRHOSIS TYPE (HCC): ICD-10-CM

## 2021-01-27 DIAGNOSIS — R10.11 RUQ ABDOMINAL PAIN: Primary | ICD-10-CM

## 2021-01-27 PROCEDURE — 99024 POSTOP FOLLOW-UP VISIT: CPT | Performed by: PHYSICIAN ASSISTANT

## 2021-01-27 RX ORDER — CYCLOBENZAPRINE HCL 10 MG
10 TABLET ORAL 3 TIMES DAILY PRN
Qty: 15 TABLET | Refills: 0 | Status: SHIPPED | OUTPATIENT
Start: 2021-01-27 | End: 2021-02-01

## 2021-01-27 NOTE — TELEPHONE ENCOUNTER
Go ahead and check labs and a CT scan abd/pelvis IV and water soluble po contrast - just want to make sure no hematoma, etc.  // GDW

## 2021-01-27 NOTE — PROGRESS NOTES
"[facility] Bariatric Surgery  [address]  [address]  [facility] [id number] [phone]      Patient Name:  Alee Wu  :  1965      Date of Visit: 2021      Reason for Visit:  POD #7      HPI:  Alee Wu is a 55 y.o. female s/p LSG/liver bx (steatohepatitis/cirrhosis) 21 GDW    Discharged on POD#2.  Doing okay - feeling tired and discouraged (thought she should be losing weight faster at this point).  Getting only 50-60g prot/day - struggling to \"get it all in.\"  Taking Zofran 2-3x/day.  Had one episode of emesis after her ride home from the hospital, none since.  Having episodic RUQ \"pains\" - cannot describe it, maybe like a cramp, but feels \"really internal\"  - tries to stand up, stretch and \"breath through it\" - lasts 30-45 seconds, but happens >20x/day, waking her in the night as well.  Denies fevers.  Bowels moving.  s/p remote jacklyn.       Typically takes Norco 7.5mg TID for chronic arthritis/back pain, but ran out prior to surgery and has not seen PCP for refill.  Has been managing pain w/ Oxy 10mg prn (postop RX) - was discharged w/ #20, says has 9 pills left.  Does not have muscle relaxer RX.      Taking Omeprazole 40mg daily.  Not yet taking vitamins.  Holding ASA  and Plavix.  Ambulating frequently.     Presurgery weight: 222 pounds.  Today's weight is 95.9 kg (211 lb 8 oz) pounds, today's  Body mass index is 34.66 kg/m²., and weight loss since surgery is 11 pounds.       Final Diagnosis   1.  STOMACH, PARTIAL GASTRECTOMY:                      Mild reactive gastropathy with foveolar hyperplasia and superficial vascular congestion        Proton pump inhibitor effect                Negative for Helicobacter pylori organisms on H&E stain                      Negative for intestinal metaplasia, dysplasia and malignancy  2.  LIVER, CORE NEEDLE BIOPSY:        Cirrhosis        Steatohepatitis; grade 1, stage 4 (Brunt's)    Microscopic Description    The " slides are reviewed and demonstrate histopathologic features supporting the above rendered diagnosis.  Core needle biopsy of the liver shows 12 portal tracts available for review.  The portal tracts are widened by bands of fibrosis and scattered inflammation.  No significant interface activity.  There is ductular proliferation present as well.  There are bridging bands of fibrosis.  Cirrhotic nodules present in the core needle biopsy.  The inflammatory infiltrate present in the bands consists of predominantly small lymphocytes, a rare plasma cell; no significant eosinophils are seen.  The lobules contain a mixed steatosis (variable 40-60%).  There is ballooning/feathery degeneration.  No Carlee bodies are seen.  No acidophil bodies are appreciated.  Special stains, were performed, with adequate controls.  There is no increased iron deposition by Prussian blue iron stain.  Trichrome reveals thick fibers bands between nodules.            Past Medical History:   Diagnosis Date   • Anxiety    • CAD (coronary artery disease)     s/p stenting 2017, on ASA + Plavix, follows w/ Dr. Walters   • Chronic back pain     on Norco daily, denies NSAIDS/steroids   • Chronic narcotic use     managed by PCP   • COPD (chronic obstructive pulmonary disease) (CMS/HCC)    • Depression    • Diabetes (CMS/HCC)     dx 2017, never on insulin, A1C 9.4   • Dyspepsia    • Dyspnea on exertion    • Edema    • Fatigue    • Former smoker     quit 2018   • Gastric bezoar     History endoscopic removal Dr. Fernández 7/24/2019   • GERD (gastroesophageal reflux disease)     controlled on Omeprazole BID, recent EGD 3/2020 w/ Dr. Fernández   • Hepatosplenomegaly     By CT scan   • History of blood transfusion     With CABG    • Hyperlipidemia    • Hypertension    • Insomnia    • Joint pain     rheumatology eval (P)   • Liver cirrhosis secondary to NARANJO (CMS/HCC)     follows w/ Dr. Fernández in [place].  MELD 8   • MI (myocardial infarction) (CMS/HCC) 2010   •  Microcytic anemia    • Morbid obesity (CMS/HCC)    • Neuropathy     on Amitriptyline + Carbamazapine   • RLS (restless legs syndrome)    • Sleep apnea     CPAP noncompliant last 6 months as above   • Tobacco use disorder, severe, in early remission    • Varices of spleen     And peritoneum CT scan 2020     Past Surgical History:   Procedure Laterality Date   •  SECTION     •  SECTION     • COLONOSCOPY     • CORONARY ANGIOPLASTY WITH STENT PLACEMENT     • CORONARY ARTERY BYPASS GRAFT      2 units of packed red blood cells given   • ENDOMETRIAL ABLATION     • ENDOSCOPY      w/ Dr. Fernández   • ENDOSCOPY N/A 2021    Procedure: ESOPHAGOGASTRODUODENOSCOPY;  Surgeon: Raymundo Mcnamara MD;  Location:  [facility] OR;  Service: Bariatric;  Laterality: N/A;   • GASTRIC SLEEVE LAPAROSCOPIC N/A 2021    Procedure: GASTRIC SLEEVE LAPAROSCOPIC;  Surgeon: Raymundo Mcnamara MD;  Location:  [facility] OR;  Service: Bariatric;  Laterality: N/A;   • KNEE SURGERY Right 2015    Laparoscopic meniscus repair   • LAPAROSCOPIC CHOLECYSTECTOMY  2016    dysfunction, no stones     Outpatient Medications Marked as Taking for the 21 encounter (Office Visit) with Lorena Maldonado PA   Medication Sig Dispense Refill   • atorvastatin (LIPITOR) 40 MG tablet Take 40 mg by mouth Daily.     • FLUoxetine (PROzac) 40 MG capsule Take 40 mg by mouth 2 (Two) Times a Day.     • isosorbide mononitrate (IMDUR) 60 MG 24 hr tablet Take 60 mg by mouth Daily. For chest pain.     • lisinopril (PRINIVIL,ZESTRIL) 5 MG tablet Take 5 mg by mouth Daily.     • loratadine (CLARITIN) 10 MG tablet Take 10 mg by mouth Daily As Needed for Allergies.     • metoprolol tartrate (LOPRESSOR) 100 MG tablet Take 100 mg by mouth 2 (Two) Times a Day.     • montelukast (SINGULAIR) 10 MG tablet Take 10 mg by mouth At Night As Needed (allergies).     • omeprazole (priLOSEC) 40 MG capsule Take 1 capsule by mouth Daily for 60  "days. 60 capsule 0   • ondansetron ODT (Zofran ODT) 4 MG disintegrating tablet Place 1 tablet on the tongue Every 8 (Eight) Hours As Needed for Nausea or Vomiting. 10 tablet 0   • oxyCODONE (ROXICODONE) 10 MG tablet Take 1 tablet by mouth Every 6 (Six) Hours As Needed for Moderate Pain . 20 tablet 0   • rOPINIRole (REQUIP) 2 MG tablet Take 2 mg by mouth Every Night. Restless leg.     • zolpidem (AMBIEN) 5 MG tablet Take 5 mg by mouth At Night As Needed for Sleep.       No Known Allergies    Social History     Socioeconomic History   • Marital status:      Spouse name: Not on file   • Number of children: Not on file   • Years of education: Not on file   • Highest education level: Not on file   Tobacco Use   • Smoking status: Former Smoker     Packs/day: 2.50     Years: 30.00     Pack years: 75.00     Quit date:      Years since quittin.0   • Smokeless tobacco: Never Used   Substance and Sexual Activity   • Alcohol use: Never     Frequency: Never   • Drug use: Never   • Sexual activity: Defer   Social History Narrative    Lives in [place], [place].   w/ 2 children (32yo and 23yo).  Unemployed.       /58 (BP Location: Left arm, Patient Position: Sitting, Cuff Size: Large Adult)   Pulse 74   Temp 97.6 °F (36.4 °C) (Temporal)   Resp 18   Ht 166.4 cm (65.5\")   Wt 95.9 kg (211 lb 8 oz)   SpO2 99%   BMI 34.66 kg/m²   Physical Exam  Constitutional:       Appearance: She is well-developed.      Comments: wearing a mask, does not her nose   Eyes:      General: No scleral icterus.     Conjunctiva/sclera: Conjunctivae normal.   Cardiovascular:      Rate and Rhythm: Normal rate.   Pulmonary:      Effort: Pulmonary effort is normal.   Abdominal:      General: Bowel sounds are normal.      Palpations: Abdomen is soft.      Tenderness: There is no abdominal tenderness.      Comments: Incisions healing well   Musculoskeletal: Normal range of motion.   Skin:     General: Skin is warm and dry. "      Findings: No rash.   Neurological:      Mental Status: She is alert.   Psychiatric:         Behavior: Behavior is cooperative.         Judgment: Judgment normal.         Assessment:   POD #7 s/p LSG/liver bx (steatohepatitis/cirrhosis) 1/20/21 GDW    Patient's Body mass index is 34.66 kg/m². BMI is above normal parameters. Recommendations include: exercise counseling and nutrition counseling.      Plan:  Discussed w/ Dr. Mcnamara.  Will order labs and a CT scan abd/pelvis IV and water soluble po contrast to r/o hematoma, intraabdominal issue.  Will RX Flexeril 10mg TID prn x 5 days.  Use heat + supportive garments prn.      Discussed importance of adequate protein intake for healing and weight loss.  Advised 100g prot/day.  Continue to advance diet per manual.  Increase exercise/activity as tolerated.  Continue w/ lifting restrictions, nothing >25 lbs x 2 more weeks.  Start vitamins as discussed.  Continue PPI.  Hold ASA/Plavix x 2 weeks and then resume.  Continue to avoid NSAIDS/tobacco x 6 weeks postop, steroids x 8 weeks postop.  Call w/ problems/concerns.    Discussed liver bx results.  Patient plans to continue to follow w/ Dr. Fernández - will fax results for his records.      The patient was instructed to follow up in 3 weeks, sooner if needed.

## 2021-01-27 NOTE — OUTREACH NOTE
General Surgery Week 1 Survey      Responses   [facility] patient discharged from?  [place]   Does the patient have one of the following disease processes/diagnoses(primary or secondary)?  General Surgery   Week 1 attempt successful?  No   Unsuccessful attempts  Attempt 2          Eldon Sullivan RN

## 2021-01-28 ENCOUNTER — READMISSION MANAGEMENT (OUTPATIENT)
Dept: CALL CENTER | Facility: HOSPITAL | Age: 56
End: 2021-01-28

## 2021-01-28 NOTE — OUTREACH NOTE
General Surgery Week 1 Survey      Responses   [facility] [place] patient discharged from?  [place]   Does the patient have one of the following disease processes/diagnoses(primary or secondary)?  General Surgery   Week 1 attempt successful?  No   Unsuccessful attempts  Attempt 3   Rescheduled  Revoked   Revoke  Decline to participate          Sindy Tello RN

## 2021-02-01 ENCOUNTER — HOSPITAL ENCOUNTER (OUTPATIENT)
Dept: CT IMAGING | Facility: HOSPITAL | Age: 56
Discharge: HOME | End: 2021-02-01
Admitting: PHYSICIAN ASSISTANT

## 2021-02-01 DIAGNOSIS — R10.11 RUQ ABDOMINAL PAIN: ICD-10-CM

## 2021-02-01 DIAGNOSIS — K74.60 CIRRHOSIS OF LIVER WITHOUT ASCITES, UNSPECIFIED HEPATIC CIRRHOSIS TYPE (HCC): ICD-10-CM

## 2021-02-01 LAB — CREAT BLDA-MCNC: 0.6 MG/DL (ref 0.6–1.3)

## 2021-02-01 PROCEDURE — 0 DIATRIZOATE MEGLUMINE & SODIUM PER 1 ML: Performed by: PHYSICIAN ASSISTANT

## 2021-02-01 PROCEDURE — 25010000002 IOPAMIDOL 61 % SOLUTION: Performed by: PHYSICIAN ASSISTANT

## 2021-02-01 PROCEDURE — 82565 ASSAY OF CREATININE: CPT

## 2021-02-01 PROCEDURE — 74177 CT ABD & PELVIS W/CONTRAST: CPT

## 2021-02-01 RX ADMIN — DIATRIZOATE MEGLUMINE AND DIATRIZOATE SODIUM 15 ML: 660; 100 LIQUID ORAL; RECTAL at 16:19

## 2021-02-01 RX ADMIN — IOPAMIDOL 85 ML: 612 INJECTION, SOLUTION INTRAVENOUS at 16:19

## 2021-02-02 ENCOUNTER — TELEPHONE (OUTPATIENT)
Dept: BARIATRICS/WEIGHT MGMT | Facility: CLINIC | Age: 56
End: 2021-02-02

## 2021-02-02 NOTE — TELEPHONE ENCOUNTER
"----- Message from Raymundo Mcnamara MD sent at 2/2/2021  9:11 AM EST -----    I looked at her films - interesting finding - she has a diaphragmatic hernia above her liver on the right - could be incisional from prev lap jacklyn.  I didn't make any umbilical incisions - I noted at the time of surgery (pics in epic) that she had adhesions along the midline pelvis that I left undisturbed.  Let her know from a sleeve standpoint, nothing to explain her pain.  Refer to general surgeon (maybe whoever did her jacklyn?) for evaluation and possible hernia repairs.      Also - no repair until at least 6 weeks out and remind her to not let others treat her with NSAIDs or ultram until at least 6 weeks post op.  Thanks!      ----- Message -----  From: Lorena Maldonado PA  Sent: 2/2/2021   8:56 AM EST  To: Raymundo Mcnamara MD    Please review.      POD#13 - 55 y.o. female s/p LSG/liver bx (steatohepatitis/cirrhosis) 1/20/21 GDW    Saw her in the office last week.  AVSS.  Abd. exam benign.    c/o episodic RUQ \"pains\" - cannot describe it, maybe like a cramp, but feels \"really internal\" - \"maybe from the liver bx?\" she says - tries to stand up, stretch and \"breath through it\" - lasts 30-45 seconds, but happens >20x/day, waking her in the night as well.  s/p remote jacklyn     Typically takes Norco 7.5mg TID for chronic arthritis/back pain, but ran out prior to surgery and has not seen PCP for refill.  Has been managing pain w/ Oxy 10mg prn (postop RX) - was discharged w/ #20.  Gave her Flexeril RX - not helping.     CT ab/pel w/ IV/PO 2/1/21 @[facility] - possibly a small postoperative hematoma or seroma measuring 3.7 x 1.9 cm at the port sites along the umbilicus.    Symptoms persist, unchanged.  Any other recommendations at this time?      ----- Message -----  From: Interface, Rad Results Cheesh-Na In  Sent: 2/1/2021   4:43 PM EST  To: JANUARY Blankenship          "

## 2021-02-05 ENCOUNTER — HOSPITAL ENCOUNTER (OUTPATIENT)
Dept: CT IMAGING | Facility: HOSPITAL | Age: 56
End: 2021-02-05

## 2021-03-23 ENCOUNTER — BULK ORDERING (OUTPATIENT)
Dept: CASE MANAGEMENT | Facility: OTHER | Age: 56
End: 2021-03-23

## 2021-03-23 DIAGNOSIS — Z23 IMMUNIZATION DUE: ICD-10-CM

## 2021-04-19 ENCOUNTER — OFFICE VISIT (OUTPATIENT)
Dept: BARIATRICS/WEIGHT MGMT | Facility: CLINIC | Age: 56
End: 2021-04-19

## 2021-04-19 VITALS
BODY MASS INDEX: 31.1 KG/M2 | DIASTOLIC BLOOD PRESSURE: 62 MMHG | RESPIRATION RATE: 18 BRPM | OXYGEN SATURATION: 99 % | HEART RATE: 69 BPM | TEMPERATURE: 97.1 F | SYSTOLIC BLOOD PRESSURE: 112 MMHG | WEIGHT: 193.5 LBS | HEIGHT: 66 IN

## 2021-04-19 DIAGNOSIS — E66.9 OBESITY, CLASS I, BMI 30-34.9: ICD-10-CM

## 2021-04-19 DIAGNOSIS — K91.2 POSTGASTRECTOMY MALABSORPTION: ICD-10-CM

## 2021-04-19 DIAGNOSIS — I10 HYPERTENSION, UNSPECIFIED TYPE: ICD-10-CM

## 2021-04-19 DIAGNOSIS — E11.69 TYPE 2 DIABETES MELLITUS WITH OTHER SPECIFIED COMPLICATION, UNSPECIFIED WHETHER LONG TERM INSULIN USE (HCC): ICD-10-CM

## 2021-04-19 DIAGNOSIS — Z90.3 POSTGASTRECTOMY MALABSORPTION: ICD-10-CM

## 2021-04-19 DIAGNOSIS — E55.9 HYPOVITAMINOSIS D: ICD-10-CM

## 2021-04-19 DIAGNOSIS — E78.5 HYPERLIPIDEMIA, UNSPECIFIED HYPERLIPIDEMIA TYPE: ICD-10-CM

## 2021-04-19 DIAGNOSIS — Z98.84 STATUS POST BARIATRIC SURGERY: Primary | ICD-10-CM

## 2021-04-19 DIAGNOSIS — Z13.0 SCREENING, IRON DEFICIENCY ANEMIA: ICD-10-CM

## 2021-04-19 DIAGNOSIS — K43.9 ABDOMINAL WALL HERNIA: ICD-10-CM

## 2021-04-19 DIAGNOSIS — R53.83 FATIGUE, UNSPECIFIED TYPE: ICD-10-CM

## 2021-04-19 PROCEDURE — 99024 POSTOP FOLLOW-UP VISIT: CPT | Performed by: PHYSICIAN ASSISTANT

## 2021-04-19 RX ORDER — OMEPRAZOLE 40 MG/1
40 CAPSULE, DELAYED RELEASE ORAL DAILY
Qty: 30 CAPSULE | Refills: 3 | Status: SHIPPED | OUTPATIENT
Start: 2021-04-19 | End: 2021-07-20 | Stop reason: SDUPTHER

## 2021-04-19 NOTE — PROGRESS NOTES
"[facility] Bariatric Surgery  [address]  [address]  [facility] [id number] [phone]        Patient Name:  Alee Wu.  :  1965      Reason for Visit:   3 months postop      HPI: Alee Wu is a 55 y.o. female  s/p LSG/liver bx (steatohepatitis/cirrhosis) 21 GDW    LOV PO#7 with RUQ pain, Dr. Mcnamara reviewed CT and notes diaphragmatic hernia on R above liver, CT read with abd wall hernia fat containing anterior to liver. Dr. Mcnamara advised general surgery referral. Had previous lap jacklyn. Not seen for 1 month visit.     Doing ok. Has nausea on regular basis, has had \"violently ill\" episodes of vomiting such as after 3 onion rings and 3 chicken tenders recently. After discussing, she thinks it is noted when overeating. concerned about slow weightloss. Acknowledges she does feel better. Able to cross her legs. Off her DM meds. Is interested in how her lipids look.    No other issues/concerns. Denies dysphagia, reflux, abdominal pain, pulmonary issues and fevers. Not tracking protein, not using supplements, not tracking calories, suspected 800-1000. overal feels that she is undereating.   Atkins bar for breakfast, tomato soup for lunch with cheese. Dinner- salad or  [place] with  chicken. Drinking  Zero sugar lemonade. Drinking 64+ fluid oz/day.  1 month labs were  Taking MVI, not taking others faithfully.  On Omeprazole .  Exercising- walking 2-3 times a week.       Continues to note intermittent RUQ pain, described as sore deep muscle pain.  Is not interested in seeing the surgeon who did her lap jacklyn in [place], prefers someone in [facility].      Presurgery weight: 222 pounds.  Today's weight is 87.8 kg (193 lb 8 oz) pounds, today's  Body mass index is 31.71 kg/m²., and@ weight loss since surgery is 29 pounds.      Past Medical History:   Diagnosis Date   • Anxiety    • CAD (coronary artery disease)     s/p stenting , on ASA + Plavix, follows w/ Dr. Walters   • Chronic " back pain     on Norco daily, denies NSAIDS/steroids   • Chronic narcotic use     managed by PCP   • COPD (chronic obstructive pulmonary disease) (CMS/HCC)    • Depression    • Diabetes (CMS/HCC)     dx 2017, never on insulin, A1C 9.4   • Dyspepsia    • Dyspnea on exertion    • Edema    • Fatigue    • Former smoker     quit 2018   • Gastric bezoar     History endoscopic removal Dr. Fernández 2019   • GERD (gastroesophageal reflux disease)     controlled on Omeprazole BID, recent EGD 3/2020 w/ Dr. Fernández   • Hepatosplenomegaly     By CT scan   • History of blood transfusion     With CABG    • Hyperlipidemia    • Hypertension    • Insomnia    • Joint pain     rheumatology eval (P)   • Liver cirrhosis secondary to NARANJO (CMS/HCC)     follows w/ Dr. Fernández in [place].  MELD 8   • MI (myocardial infarction) (CMS/HCC)    • Microcytic anemia    • Morbid obesity (CMS/HCC)    • Neuropathy     on Amitriptyline + Carbamazapine   • RLS (restless legs syndrome)    • Sleep apnea     CPAP noncompliant last 6 months as above   • Tobacco use disorder, severe, in early remission    • Varices of spleen     And peritoneum CT scan 2020     Past Surgical History:   Procedure Laterality Date   •  SECTION     •  SECTION     • COLONOSCOPY     • CORONARY ANGIOPLASTY WITH STENT PLACEMENT     • CORONARY ARTERY BYPASS GRAFT      2 units of packed red blood cells given   • ENDOMETRIAL ABLATION     • ENDOSCOPY      w/ Dr. Fernández   • ENDOSCOPY N/A 2021    Procedure: ESOPHAGOGASTRODUODENOSCOPY;  Surgeon: Raymundo Mcnamara MD;  Location:  [facility] OR;  Service: Bariatric;  Laterality: N/A;   • GASTRIC SLEEVE LAPAROSCOPIC N/A 2021    Procedure: GASTRIC SLEEVE LAPAROSCOPIC;  Surgeon: Raymundo Mcnamara MD;  Location:  [facility] OR;  Service: Bariatric;  Laterality: N/A;   • KNEE SURGERY Right     Laparoscopic meniscus repair   • LAPAROSCOPIC CHOLECYSTECTOMY  2016    dysfunction, no  "stones     Outpatient Medications Marked as Taking for the 21 encounter (Office Visit) with Kika Alexander PA-C   Medication Sig Dispense Refill   • atorvastatin (LIPITOR) 40 MG tablet Take 40 mg by mouth Daily.     • FLUoxetine (PROzac) 40 MG capsule Take 40 mg by mouth 2 (Two) Times a Day.     • isosorbide mononitrate (IMDUR) 60 MG 24 hr tablet Take 60 mg by mouth Daily. For chest pain.     • lisinopril (PRINIVIL,ZESTRIL) 5 MG tablet Take 5 mg by mouth Daily.     • loratadine (CLARITIN) 10 MG tablet Take 10 mg by mouth Daily As Needed for Allergies.     • metoprolol tartrate (LOPRESSOR) 100 MG tablet Take 100 mg by mouth 2 (Two) Times a Day.     • montelukast (SINGULAIR) 10 MG tablet Take 10 mg by mouth At Night As Needed (allergies).     • rOPINIRole (REQUIP) 2 MG tablet Take 2 mg by mouth Every Night. Restless leg.     • zolpidem (AMBIEN) 5 MG tablet Take 5 mg by mouth At Night As Needed for Sleep.         No Known Allergies    Social History     Socioeconomic History   • Marital status:      Spouse name: Not on file   • Number of children: Not on file   • Years of education: Not on file   • Highest education level: Not on file   Tobacco Use   • Smoking status: Former Smoker     Packs/day: 2.50     Years: 30.00     Pack years: 75.00     Quit date:      Years since quittin.2   • Smokeless tobacco: Never Used   Vaping Use   • Vaping Use: Never used   Substance and Sexual Activity   • Alcohol use: Never   • Drug use: Never   • Sexual activity: Defer       /62 (BP Location: Left arm, Patient Position: Sitting, Cuff Size: Large Adult)   Pulse 69   Temp 97.1 °F (36.2 °C) (Temporal)   Resp 18   Ht 166.4 cm (65.5\")   Wt 87.8 kg (193 lb 8 oz)   SpO2 99%   BMI 31.71 kg/m²     Physical Exam  Constitutional:       Appearance: She is well-developed. She is obese.   HENT:      Head: Normocephalic and atraumatic.   Cardiovascular:      Rate and Rhythm: Normal rate and regular rhythm. "   Pulmonary:      Effort: Pulmonary effort is normal.      Breath sounds: Normal breath sounds.   Abdominal:      General: Bowel sounds are normal.      Palpations: Abdomen is soft.      Comments: Incisions well healed   Skin:     General: Skin is warm and dry.   Neurological:      Mental Status: She is alert and oriented to person, place, and time.   Psychiatric:         Behavior: Behavior normal.         Thought Content: Thought content normal.         Judgment: Judgment normal.           Assessment:  3 months s/p LSG/liver bx (steatohepatitis/cirrhosis) 1/20/21 GDW      ICD-10-CM ICD-9-CM   1. Status post bariatric surgery  Z98.84 V45.86   2. Obesity, Class I, BMI 30-34.9  E66.9 278.00   3. Fatigue, unspecified type  R53.83 780.79   4. Screening, iron deficiency anemia  Z13.0 V78.0   5. Postgastrectomy malabsorption  K91.2 579.3    Z90.3    6. Hypovitaminosis D  E55.9 268.9   7. Hypertension, unspecified type  I10 401.9   8. Hyperlipidemia, unspecified hyperlipidemia type  E78.5 272.4   9. Type 2 diabetes mellitus with other specified complication, unspecified whether long term insulin use (CMS/[facility])  E11.69 250.80         Plan:  Doing well. Omeprazole 40mg daily Rx sent. Gave dietary handouts for guidance, advised f/u with Myriam in a week or so after starting to track intake. Goal 70-100g protien daily, adding supplements, regular meals. Continue w/ good food choices and healthy habits. Continue fluids 64oz daily. encouraged routine exercise.  Routine bariatric labs ordered, added lipids and A1C to share with PCP.  Continue vitamins w/ adjustments pending lab results.  Call w/ problems/concerns.     Will refer to general surgery for hernia noted on CT.     Patient's Body mass index is 31.71 kg/m². BMI is above normal parameters. Recommendations include: exercise counseling and nutrition counseling.        The patient was instructed to follow up in 3 months, sooner if needed.

## 2021-07-20 ENCOUNTER — OFFICE VISIT (OUTPATIENT)
Dept: BARIATRICS/WEIGHT MGMT | Facility: CLINIC | Age: 56
End: 2021-07-20

## 2021-07-20 VITALS
OXYGEN SATURATION: 98 % | HEART RATE: 63 BPM | BODY MASS INDEX: 31.5 KG/M2 | RESPIRATION RATE: 18 BRPM | WEIGHT: 196 LBS | TEMPERATURE: 98.1 F | HEIGHT: 66 IN | DIASTOLIC BLOOD PRESSURE: 62 MMHG | SYSTOLIC BLOOD PRESSURE: 122 MMHG

## 2021-07-20 DIAGNOSIS — Z13.0 SCREENING, IRON DEFICIENCY ANEMIA: ICD-10-CM

## 2021-07-20 DIAGNOSIS — R53.83 FATIGUE, UNSPECIFIED TYPE: ICD-10-CM

## 2021-07-20 DIAGNOSIS — E66.9 OBESITY, CLASS I, BMI 30-34.9: Primary | ICD-10-CM

## 2021-07-20 DIAGNOSIS — Z90.3 POSTGASTRECTOMY MALABSORPTION: ICD-10-CM

## 2021-07-20 DIAGNOSIS — K91.2 POSTGASTRECTOMY MALABSORPTION: ICD-10-CM

## 2021-07-20 DIAGNOSIS — Z13.21 MALNUTRITION SCREEN: ICD-10-CM

## 2021-07-20 DIAGNOSIS — E55.9 HYPOVITAMINOSIS D: ICD-10-CM

## 2021-07-20 PROCEDURE — 99214 OFFICE O/P EST MOD 30 MIN: CPT | Performed by: PHYSICIAN ASSISTANT

## 2021-07-20 RX ORDER — OMEPRAZOLE 40 MG/1
40 CAPSULE, DELAYED RELEASE ORAL 2 TIMES DAILY
Qty: 60 CAPSULE | Refills: 3 | Status: SHIPPED | OUTPATIENT
Start: 2021-07-20

## 2021-07-20 RX ORDER — ASPIRIN 81 MG/1
81 TABLET ORAL DAILY
COMMUNITY

## 2021-07-20 NOTE — PROGRESS NOTES
"[facility] Bariatric Surgery   [address]  [address]  [facility] [id number] [phone]        Patient Name:  Alee Wu.  :  1965        Reason for Visit:   6 months postop      HPI: Alee Wu is a 55 y.o. female  s/p LSG/liver bx (steatohepatitis/cirrhosis) 21 GDW    LOV referred to general surgery for hernia noted on past CT.     Doing fine although dissatified with weight gain.  Would like to know how to change things to see results.  Seeing Dr. Fernández liver specialist and knows she needs to lose weight due to liver disease,  \"life or death\". Taking omeprazole 40mg  daily to control reflux, occ wakes up strangled at night- twice a week. Feels stomach is getting bigger, denies LE edema.  Denies dysphagia, nausea, vomiting, abdominal pain, pulmonary issues and fevers. Eating nearly all cream cheese and multi grain crackers, most of her diet.  Nibbles on them all day, keeps them at her desk.   No protein supplements. Getting 40g- 60g (?) with occ chicken or chicken salad.  Drinking 64+ fluid oz/day.post op labs not completed, zero sugar lemonade. Not on vtamins, hasn't had bariatric labs since surgery. On Omeprazole .  Exercising- walks in evening.          Presurgery weight: 222 pounds.  Today's weight is 88.9 kg (196 lb) pounds, today's  Body mass index is 32.12 kg/m²., and@ weight loss since surgery is 26 pounds.      Past Medical History:   Diagnosis Date   • Anxiety    • CAD (coronary artery disease)     s/p stenting 2017, on ASA + Plavix, follows w/ Dr. Walters   • Chronic back pain     on Norco daily, denies NSAIDS/steroids   • Chronic narcotic use     managed by PCP   • COPD (chronic obstructive pulmonary disease) (CMS/HCC)    • Depression    • Diabetes (CMS/HCC)     dx 2017, never on insulin, A1C 9.4   • Dyspepsia    • Dyspnea on exertion    • Edema    • Fatigue    • Former smoker     quit    • Gastric bezoar     History endoscopic removal Dr. Fernández 2019 "   • GERD (gastroesophageal reflux disease)     controlled on Omeprazole BID, recent EGD 3/2020 w/ Dr. Fernández   • Hepatosplenomegaly     By CT scan   • History of blood transfusion     With CABG    • Hyperlipidemia    • Hypertension    • Insomnia    • Joint pain     rheumatology eval (P)   • Liver cirrhosis secondary to NARANJO (CMS/HCC)     follows w/ Dr. Fernández in [place].  MELD 8   • MI (myocardial infarction) (CMS/HCC)    • Microcytic anemia    • Morbid obesity (CMS/HCC)    • Neuropathy     on Amitriptyline + Carbamazapine   • RLS (restless legs syndrome)    • Sleep apnea     CPAP noncompliant last 6 months as above   • Tobacco use disorder, severe, in early remission    • Varices of spleen     And peritoneum CT scan 2020     Past Surgical History:   Procedure Laterality Date   •  SECTION     •  SECTION     • COLONOSCOPY     • CORONARY ANGIOPLASTY WITH STENT PLACEMENT     • CORONARY ARTERY BYPASS GRAFT      2 units of packed red blood cells given   • ENDOMETRIAL ABLATION     • ENDOSCOPY      w/ Dr. Fernández   • ENDOSCOPY N/A 2021    Procedure: ESOPHAGOGASTRODUODENOSCOPY;  Surgeon: Raymundo Mcnamara MD;  Location:  [facility] OR;  Service: Bariatric;  Laterality: N/A;   • GASTRIC SLEEVE LAPAROSCOPIC N/A 2021    Procedure: GASTRIC SLEEVE LAPAROSCOPIC;  Surgeon: Raymundo Mcnamara MD;  Location:  [facility] OR;  Service: Bariatric;  Laterality: N/A;   • KNEE SURGERY Right 2015    Laparoscopic meniscus repair   • LAPAROSCOPIC CHOLECYSTECTOMY  2016    dysfunction, no stones     Outpatient Medications Marked as Taking for the 21 encounter (Office Visit) with Kika Alexander PA-C   Medication Sig Dispense Refill   • atorvastatin (LIPITOR) 40 MG tablet Take 40 mg by mouth Daily.     • FLUoxetine (PROzac) 40 MG capsule Take 40 mg by mouth 2 (Two) Times a Day.     • isosorbide mononitrate (IMDUR) 60 MG 24 hr tablet Take 60 mg by mouth Daily. For chest pain.    "  • lisinopril (PRINIVIL,ZESTRIL) 5 MG tablet Take 5 mg by mouth Daily.     • metoprolol tartrate (LOPRESSOR) 100 MG tablet Take 100 mg by mouth 2 (Two) Times a Day.     • nitroglycerin (NITROSTAT) 0.4 MG SL tablet Place 0.4 mg under the tongue Every 5 (Five) Minutes As Needed for Chest Pain. Take no more than 3 doses in 15 minutes.     • omeprazole (priLOSEC) 40 MG capsule Take 1 capsule by mouth 2 (two) times a day. 60 capsule 3   • rOPINIRole (REQUIP) 2 MG tablet Take 2 mg by mouth Every Night. Restless leg.     • [DISCONTINUED] omeprazole (priLOSEC) 40 MG capsule Take 1 capsule by mouth Daily. 30 capsule 3       No Known Allergies    Social History     Socioeconomic History   • Marital status:      Spouse name: Not on file   • Number of children: Not on file   • Years of education: Not on file   • Highest education level: Not on file   Tobacco Use   • Smoking status: Former Smoker     Packs/day: 2.50     Years: 30.00     Pack years: 75.00     Quit date:      Years since quittin.5   • Smokeless tobacco: Never Used   Vaping Use   • Vaping Use: Never used   Substance and Sexual Activity   • Alcohol use: Never   • Drug use: Never   • Sexual activity: Defer       /62 (BP Location: Left arm, Patient Position: Sitting, Cuff Size: Adult)   Pulse 63   Temp 98.1 °F (36.7 °C) (Temporal)   Resp 18   Ht 166.4 cm (65.5\")   Wt 88.9 kg (196 lb)   SpO2 98%   BMI 32.12 kg/m²     Physical Exam  Constitutional:       Appearance: She is well-developed. She is obese.   HENT:      Head: Normocephalic and atraumatic.   Cardiovascular:      Rate and Rhythm: Normal rate and regular rhythm.   Pulmonary:      Effort: Pulmonary effort is normal.      Breath sounds: Normal breath sounds.   Abdominal:      General: Bowel sounds are normal.      Palpations: Abdomen is soft.   Skin:     General: Skin is warm and dry.   Neurological:      Mental Status: She is alert.   Psychiatric:         Mood and Affect: Mood " normal.         Behavior: Behavior normal.         Thought Content: Thought content normal.         Judgment: Judgment normal.           Assessment:  6 months s/p LSG/liver bx (steatohepatitis/cirrhosis) 1/20/21 GDW    ICD-10-CM ICD-9-CM   1. Obesity, Class I, BMI 30-34.9  E66.9 278.00   2. Fatigue, unspecified type  R53.83 780.79   3. Hypovitaminosis D  E55.9 268.9   4. Screening, iron deficiency anemia  Z13.0 V78.0   5. Malnutrition screen  Z13.21 V77.2   6. Postgastrectomy malabsorption  K91.2 579.3    Z90.3          Plan:  Increase PPI to BID. Discussed dietary goal, stop crackers, start healthy, high protein, whole foods in regular meals. Will follow up with dietician.   Focus on high protein, low carb.  Start tracking intake, goal of protein 100g daily.  Start routine exercise, sent referral to .  Routine bariatric labs ordered.  Start MVI with further vitamins recs pending lab results.  Call w/ problems/concerns.     Patient's Body mass index is 32.12 kg/m². indicating that she is morbidly obese (BMI > 40 or > 35 with obesity - related health condition). Obesity-related health conditions include the following: as above. Obesity is improving with treatment. BMI is is above average; BMI management plan is completed. We discussed low calorie, low carb based diet program, portion control and increasing exercise..        The patient was instructed to follow up in 3 months, sooner if needed.

## 2021-08-02 ENCOUNTER — OFFICE VISIT (OUTPATIENT)
Dept: SURGERY | Facility: CLINIC | Age: 56
End: 2021-08-02

## 2021-08-02 VITALS
DIASTOLIC BLOOD PRESSURE: 80 MMHG | SYSTOLIC BLOOD PRESSURE: 122 MMHG | WEIGHT: 202 LBS | HEIGHT: 66 IN | BODY MASS INDEX: 32.47 KG/M2

## 2021-08-02 DIAGNOSIS — K76.0 FATTY LIVER: Primary | ICD-10-CM

## 2021-08-02 DIAGNOSIS — K43.2 INCISIONAL HERNIA, WITHOUT OBSTRUCTION OR GANGRENE: ICD-10-CM

## 2021-08-02 PROCEDURE — 99213 OFFICE O/P EST LOW 20 MIN: CPT | Performed by: SURGERY

## 2021-08-02 RX ORDER — MIRTAZAPINE 15 MG/1
15 TABLET, FILM COATED ORAL NIGHTLY
COMMUNITY

## 2021-08-02 NOTE — PROGRESS NOTES
Subjective   Alee Wu is a 55 y.o. female.     Chief Complaint: incisional hernia    History of Present Illness She had a gastric sleeve surgery about 5 months ago and was noted during surgery to have a small hernia in the RUQ. She had had a prior lap jacklyn years ago and a open heart surgery. She had not noticed any bulge, but has occasional mild twinge in the RUQ. A CT shows a small amount of fat in a defect in the inner muscle layer. No bowel.     The following portions of the patient's history were reviewed and updated as appropriate: current medications, past family history, past medical history, past social history, past surgical history and problem list.    Review of Systems   Constitutional: Negative for activity change, appetite change, chills, fever and unexpected weight change.   HENT: Negative for congestion, facial swelling and sore throat.    Eyes: Negative for photophobia and visual disturbance.   Respiratory: Negative for chest tightness, shortness of breath and wheezing.    Cardiovascular: Negative for chest pain, palpitations and leg swelling.   Gastrointestinal: Positive for abdominal pain. Negative for abdominal distention, anal bleeding, blood in stool, constipation, diarrhea, nausea, rectal pain and vomiting.   Endocrine: Negative for cold intolerance, heat intolerance, polydipsia and polyuria.   Genitourinary: Negative for difficulty urinating, dysuria, flank pain and urgency.   Musculoskeletal: Negative for back pain and myalgias.   Skin: Negative for rash and wound.   Allergic/Immunologic: Negative for immunocompromised state.   Neurological: Negative for dizziness, seizures, syncope, light-headedness, numbness and headaches.   Hematological: Negative for adenopathy. Does not bruise/bleed easily.   Psychiatric/Behavioral: Negative for behavioral problems and confusion. The patient is not nervous/anxious.        Objective   Physical Exam  Vitals reviewed.   Constitutional:       General:  She is not in acute distress.     Appearance: She is well-developed. She is not ill-appearing.       HENT:      Head: Normocephalic. No laceration. Hair is normal.      Right Ear: Hearing and ear canal normal.      Left Ear: Hearing and ear canal normal.      Nose: Nose normal.      Right Sinus: No maxillary sinus tenderness or frontal sinus tenderness.      Left Sinus: No maxillary sinus tenderness or frontal sinus tenderness.   Eyes:      General: Lids are normal.      Conjunctiva/sclera: Conjunctivae normal.      Pupils: Pupils are equal, round, and reactive to light.   Neck:      Thyroid: No thyroid mass or thyromegaly.      Vascular: No JVD.      Trachea: No tracheal tenderness or tracheal deviation.   Cardiovascular:      Rate and Rhythm: Normal rate and regular rhythm.      Heart sounds: No murmur heard.   No gallop.    Pulmonary:      Effort: Pulmonary effort is normal.      Breath sounds: Normal breath sounds. No stridor. No wheezing.   Chest:      Chest wall: No tenderness.   Abdominal:      General: Bowel sounds are normal. There is no distension.      Palpations: Abdomen is soft. There is no mass.      Tenderness: There is no abdominal tenderness. There is no guarding or rebound.      Hernia: No hernia is present.   Musculoskeletal:         General: No deformity.      Cervical back: Normal range of motion.   Lymphadenopathy:      Cervical: No cervical adenopathy.      Upper Body:      Right upper body: No supraclavicular adenopathy.      Left upper body: No supraclavicular adenopathy.   Skin:     General: Skin is warm and dry.      Coloration: Skin is not pale.      Findings: No erythema or rash.   Neurological:      Mental Status: She is alert and oriented to person, place, and time.      Motor: No abnormal muscle tone.   Psychiatric:         Behavior: Behavior normal.         Thought Content: Thought content normal.         Past Medical History:   Diagnosis Date   • Anxiety    • CAD (coronary artery  disease)     s/p stenting 2017, on ASA + Plavix, follows w/ Dr. Walters   • Chronic back pain     on Norco daily, denies NSAIDS/steroids   • Chronic narcotic use     managed by PCP   • COPD (chronic obstructive pulmonary disease) (CMS/HCC)    • Depression    • Diabetes (CMS/HCC)     dx 2017, never on insulin, A1C 9.4   • Dyspepsia    • Dyspnea on exertion    • Edema    • Fatigue    • Former smoker     quit 2018   • Gastric bezoar     History endoscopic removal Dr. Fernández 7/24/2019   • GERD (gastroesophageal reflux disease)     controlled on Omeprazole BID, recent EGD 3/2020 w/ Dr. Fernández   • Hepatosplenomegaly     By CT scan   • History of blood transfusion     With CABG    • Hyperlipidemia    • Hypertension    • Insomnia    • Joint pain     rheumatology eval (P)   • Liver cirrhosis secondary to NARANJO (CMS/HCC)     follows w/ Dr. Fernández in [place].  MELD 8   • MI (myocardial infarction) (CMS/HCC) 2010   • Microcytic anemia    • Morbid obesity (CMS/HCC)    • Neuropathy     on Amitriptyline + Carbamazapine   • RLS (restless legs syndrome)    • Sleep apnea     CPAP noncompliant last 6 months as above   • Tobacco use disorder, severe, in early remission    • Varices of spleen     And peritoneum CT scan January 2020       Family History   Problem Relation Age of Onset   • Obesity Mother    • Hypertension Mother    • Heart disease Mother    • Hypertension Father    • Heart disease Father    • Heart attack Father    • Obesity Father    • Obesity Sister    • Diabetes Sister    • Hypertension Sister    • COPD Maternal Grandmother    • Obesity Maternal Grandmother    • Hypertension Maternal Grandmother    • Stroke Maternal Grandmother    • Cirrhosis Maternal Grandfather    • Hypertension Paternal Grandmother    • Obesity Paternal Grandmother    • Cancer Paternal Grandfather        Social History     Tobacco Use   • Smoking status: Former Smoker     Packs/day: 2.50     Years: 30.00     Pack years: 75.00     Quit date: 2017      Years since quittin.5   • Smokeless tobacco: Never Used   Vaping Use   • Vaping Use: Never used   Substance Use Topics   • Alcohol use: Never   • Drug use: Never       Past Surgical History:   Procedure Laterality Date   •  SECTION     •  SECTION     • COLONOSCOPY     • CORONARY ANGIOPLASTY WITH STENT PLACEMENT     • CORONARY ARTERY BYPASS GRAFT      2 units of packed red blood cells given   • ENDOMETRIAL ABLATION     • ENDOSCOPY      w/ Dr. Fernández   • ENDOSCOPY N/A 2021    Procedure: ESOPHAGOGASTRODUODENOSCOPY;  Surgeon: Raymundo Mcnamara MD;  Location:  [facility];  Service: Bariatric;  Laterality: N/A;   • GASTRIC SLEEVE LAPAROSCOPIC N/A 2021    Procedure: GASTRIC SLEEVE LAPAROSCOPIC;  Surgeon: Raymundo Mcnamara MD;  Location:  [facility];  Service: Bariatric;  Laterality: N/A;   • KNEE SURGERY Right     Laparoscopic meniscus repair   • LAPAROSCOPIC CHOLECYSTECTOMY  2016    dysfunction, no stones       Current Outpatient Medications   Medication Instructions   • aspirin 81 mg, Oral, Daily   • atorvastatin (LIPITOR) 40 mg, Oral, Daily   • FLUoxetine (PROZAC) 40 mg, Oral, 2 Times Daily   • isosorbide mononitrate (IMDUR) 60 mg, Oral, Daily, For chest pain.   • lisinopril (PRINIVIL,ZESTRIL) 5 mg, Oral, Daily   • loratadine (CLARITIN) 10 mg, Oral, Daily PRN   • metoprolol tartrate (LOPRESSOR) 100 mg, Oral, 2 Times Daily   • mirtazapine (REMERON) 15 mg, Oral, Nightly   • montelukast (SINGULAIR) 10 mg, Oral, Nightly PRN   • nitroglycerin (NITROSTAT) 0.4 mg, Sublingual, Every 5 Minutes PRN, Take no more than 3 doses in 15 minutes.   • omeprazole (PRILOSEC) 40 mg, Oral, 2 times daily   • rOPINIRole (REQUIP) 2 mg, Oral, Nightly, Restless leg.   • zolpidem (AMBIEN) 5 mg, Oral, Nightly PRN         Assessment/Plan   Diagnoses and all orders for this visit:    1. Fatty liver (Primary)    2. Incisional hernia, without obstruction or gangrene    The hernia is very  small and not causing her much trouble, and as she is continuing to work on weight loss, I would hold off on any repair as it is not urgent.

## 2021-10-28 NOTE — PROGRESS NOTES
Continued Stay Note  [facility]     Patient Name: Alee Wu  MRN: [id number]  Today's Date: 1/22/2021    Admit Date: 1/20/2021    Discharge Plan     Row Name 01/22/21 1411       Plan    Plan Comments  CM spoke with pt at bedside. Pt resting and still plans to return home with assitance from S.O. Pt denies needs at this time and will have private transportation home at discharge. CM will continue to follow.    Final Discharge Disposition Code  01 - home or self-care        Discharge Codes    No documentation.             Kika Jenkins RN     Per , wound flushed with water and 4 steristrips applied with mastisol.  Care instructions given verbally.

## 2022-06-08 ENCOUNTER — OFFICE VISIT (OUTPATIENT)
Dept: BARIATRICS/WEIGHT MGMT | Facility: CLINIC | Age: 57
End: 2022-06-08

## 2022-06-08 VITALS
HEART RATE: 67 BPM | RESPIRATION RATE: 14 BRPM | TEMPERATURE: 97.5 F | WEIGHT: 222 LBS | DIASTOLIC BLOOD PRESSURE: 70 MMHG | BODY MASS INDEX: 35.68 KG/M2 | HEIGHT: 66 IN | OXYGEN SATURATION: 97 % | SYSTOLIC BLOOD PRESSURE: 110 MMHG

## 2022-06-08 DIAGNOSIS — E55.9 HYPOVITAMINOSIS D: ICD-10-CM

## 2022-06-08 DIAGNOSIS — Z90.3 POSTGASTRECTOMY MALABSORPTION: ICD-10-CM

## 2022-06-08 DIAGNOSIS — E66.9 OBESITY, CLASS II, BMI 35-39.9: ICD-10-CM

## 2022-06-08 DIAGNOSIS — Z13.0 SCREENING, IRON DEFICIENCY ANEMIA: ICD-10-CM

## 2022-06-08 DIAGNOSIS — Z98.84 STATUS POST BARIATRIC SURGERY: Primary | ICD-10-CM

## 2022-06-08 DIAGNOSIS — R53.83 FATIGUE, UNSPECIFIED TYPE: ICD-10-CM

## 2022-06-08 DIAGNOSIS — I25.10 CORONARY ARTERY DISEASE INVOLVING NATIVE HEART, UNSPECIFIED VESSEL OR LESION TYPE, UNSPECIFIED WHETHER ANGINA PRESENT: ICD-10-CM

## 2022-06-08 DIAGNOSIS — K74.60 HEPATIC CIRRHOSIS, UNSPECIFIED HEPATIC CIRRHOSIS TYPE, UNSPECIFIED WHETHER ASCITES PRESENT: ICD-10-CM

## 2022-06-08 DIAGNOSIS — Z13.21 MALNUTRITION SCREEN: ICD-10-CM

## 2022-06-08 DIAGNOSIS — K91.2 POSTGASTRECTOMY MALABSORPTION: ICD-10-CM

## 2022-06-08 PROCEDURE — 99214 OFFICE O/P EST MOD 30 MIN: CPT | Performed by: PHYSICIAN ASSISTANT

## 2022-06-08 RX ORDER — IBUPROFEN 800 MG/1
TABLET ORAL
COMMUNITY
Start: 2022-05-14

## 2022-06-08 RX ORDER — FLUTICASONE PROPIONATE 50 MCG
SPRAY, SUSPENSION (ML) NASAL
COMMUNITY
Start: 2022-05-02

## 2022-06-08 RX ORDER — BLOOD-GLUCOSE METER
KIT MISCELLANEOUS
COMMUNITY
Start: 2022-04-15

## 2022-06-08 RX ORDER — CETIRIZINE HYDROCHLORIDE 10 MG/1
TABLET ORAL
COMMUNITY
Start: 2022-05-02

## 2022-06-08 RX ORDER — OMEPRAZOLE 20 MG/1
CAPSULE, DELAYED RELEASE ORAL
COMMUNITY
Start: 2022-04-13

## 2022-06-08 RX ORDER — LANCETS 28 GAUGE
EACH MISCELLANEOUS
COMMUNITY
Start: 2022-03-18

## 2022-06-08 NOTE — PROGRESS NOTES
[facility] Bariatric Surgery  [address]  [address]  [facility] [id number] [phone]        Patient Name:  Alee Wu.  :  1965        Reason for Visit:   16 months postop      HPI: Alee Wu is a 56 y.o. female s/p LSG/liver bx (steatohepatitis/cirrhosis) 21 GDW    LOV 2021 at 6 months postop- doing fine although dissatisfied with weight gain. 26lb down. PPI was increased to BID. Advised dietician consult and  referral. Bariatric labs were ordered but never drawn postop.     Presents today to discuss her weight gain. Says she Had covid in September and spiraled down from there. Went to ED with covid but wasn't admitted. Ate and drank whatever she wanted at that time and really didnt get back on track. Has persistent cough, headache and brain fog since.  Went to gyn with vaginal bleeding recently, dx perimenopausal. Feels this contributes. Saw Dr. Fernández for cirrhosis who retired and she needs a new GI. Also requesting to see a new cardiologist.   Denies dysphagia, reflux, nausea, vomiting and abdominal pain. She has noticed some swelling in legs and overall feels bloated/ swollen.  No other issues/concerns. drinks zero sugar pink lemonade. tries to avoid HFCS, Not tracking intake. Has been eating whatever she wants. She did watch her diet for a month making healthy choices and lost 2lb which was discouraging and stopped.  Feels her restriction has lessened.    Has not had any bariatric labs drawn since surgery.   Not taking vitamins.   On Omeprazole 20mg bruce, occ needs 40mg prn. Was walking until covid in the fall.      Presurgery weight: 222 pounds.  Today's weight is 101 kg (222 lb) pounds, today's  Body mass index is 35.83 kg/m²., and@ weight loss since surgery is 0 pounds.      Past Medical History:   Diagnosis Date   • Anxiety    • Arthritis Not sure   • CAD (coronary artery disease)     s/p stenting , on ASA + Plavix, follows w/ Dr. Walters    • Chronic back pain     on Norco daily, denies NSAIDS/steroids   • Chronic narcotic use     managed by PCP   • COPD (chronic obstructive pulmonary disease) (HCC)    • Depression    • Diabetes (HCC)     dx 2017, never on insulin, A1C 9.4   • Dyspepsia    • Dyspnea on exertion    • Edema    • Fatigue    • Former smoker     quit 2018   • Gastric bezoar     History endoscopic removal Dr. Fernández 2019   • GERD (gastroesophageal reflux disease)     controlled on Omeprazole BID, recent EGD 3/2020 w/ Dr. Fernández   • Hepatosplenomegaly     By CT scan   • History of blood transfusion     With CABG    • Hyperlipidemia    • Hypertension    • Insomnia    • Joint pain     rheumatology eval (P)   • Liver cirrhosis secondary to NARANJO (HCC)     follows w/ Dr. Fernández in [place].  MELD 8   • MI (myocardial infarction) (HCC)    • Microcytic anemia    • Morbid obesity (HCC)    • Neuropathy     on Amitriptyline + Carbamazapine   • RLS (restless legs syndrome)    • Sleep apnea     CPAP noncompliant last 6 months as above   • Tobacco use disorder, severe, in early remission    • Varices of spleen     And peritoneum CT scan 2020     Past Surgical History:   Procedure Laterality Date   •  SECTION     •  SECTION     • COLONOSCOPY     • CORONARY ANGIOPLASTY WITH STENT PLACEMENT     • CORONARY ARTERY BYPASS GRAFT      2 units of packed red blood cells given   • ENDOMETRIAL ABLATION     • ENDOSCOPY      w/ Dr. Fernández   • ENDOSCOPY N/A 2021    Procedure: ESOPHAGOGASTRODUODENOSCOPY;  Surgeon: Raymundo Mcnamara MD;  Location:  [facility];  Service: Bariatric;  Laterality: N/A;   • GASTRIC SLEEVE LAPAROSCOPIC N/A 2021    Procedure: GASTRIC SLEEVE LAPAROSCOPIC;  Surgeon: Raymundo Mcnamara MD;  Location:  [facility];  Service: Bariatric;  Laterality: N/A;   • KNEE SURGERY Right 2015    Laparoscopic meniscus repair   • LAPAROSCOPIC CHOLECYSTECTOMY  2016    dysfunction, no stones      Outpatient Medications Marked as Taking for the 22 encounter (Office Visit) with Kika Alexander PA-C   Medication Sig Dispense Refill   • aspirin 81 MG EC tablet Take 81 mg by mouth Daily.     • atorvastatin (LIPITOR) 40 MG tablet Take 40 mg by mouth Daily.     • cetirizine (zyrTEC) 10 MG tablet      • FLUoxetine (PROzac) 40 MG capsule Take 40 mg by mouth 2 (Two) Times a Day.     • fluticasone (FLONASE) 50 MCG/ACT nasal spray      • FREESTYLE LITE test strip      • ibuprofen (ADVIL,MOTRIN) 800 MG tablet      • isosorbide mononitrate (IMDUR) 60 MG 24 hr tablet Take 60 mg by mouth Daily. For chest pain.     • Lancets (freestyle) lancets      • lisinopril (PRINIVIL,ZESTRIL) 5 MG tablet Take 5 mg by mouth Daily.     • loratadine (CLARITIN) 10 MG tablet Take 10 mg by mouth Daily As Needed for Allergies.     • metFORMIN (GLUCOPHAGE) 1000 MG tablet      • metoprolol tartrate (LOPRESSOR) 100 MG tablet Take 100 mg by mouth 2 (Two) Times a Day.     • mirtazapine (REMERON) 15 MG tablet Take 15 mg by mouth Every Night.     • montelukast (SINGULAIR) 10 MG tablet Take 10 mg by mouth At Night As Needed (allergies).     • nitroglycerin (NITROSTAT) 0.4 MG SL tablet Place 0.4 mg under the tongue Every 5 (Five) Minutes As Needed for Chest Pain. Take no more than 3 doses in 15 minutes.     • omeprazole (priLOSEC) 20 MG capsule      • rOPINIRole (REQUIP) 2 MG tablet Take 2 mg by mouth Every Night. Restless leg.     • zolpidem (AMBIEN) 5 MG tablet Take 5 mg by mouth At Night As Needed for Sleep.         No Known Allergies    Social History     Socioeconomic History   • Marital status:    Tobacco Use   • Smoking status: Former Smoker     Packs/day: 1.50     Years: 30.00     Pack years: 45.00     Types: Cigarettes     Start date: 1980     Quit date: 2016     Years since quittin.7   • Smokeless tobacco: Never Used   Vaping Use   • Vaping Use: Never used   Substance and Sexual Activity   • Alcohol use: Never  "  • Drug use: Never   • Sexual activity: Yes     Partners: Female     Birth control/protection: Post-menopausal       /70   Pulse 67   Temp 97.5 °F (36.4 °C) (Infrared)   Resp 14   Ht 167.6 cm (66\")   Wt 101 kg (222 lb)   SpO2 97%   BMI 35.83 kg/m²     Physical Exam  Constitutional:       Appearance: She is well-developed. She is obese.   HENT:      Head: Normocephalic and atraumatic.   Cardiovascular:      Rate and Rhythm: Normal rate and regular rhythm.   Pulmonary:      Effort: Pulmonary effort is normal.      Breath sounds: Normal breath sounds.   Abdominal:      General: Bowel sounds are normal.      Palpations: Abdomen is soft.      Comments: Incisions healing well   Skin:     General: Skin is warm and dry.   Neurological:      Mental Status: She is alert.   Psychiatric:         Mood and Affect: Mood normal.         Behavior: Behavior normal.         Thought Content: Thought content normal.         Judgment: Judgment normal.           Assessment:  16 months s/p LSG/liver bx (steatohepatitis/cirrhosis) 1/20/21 GDW    ICD-10-CM ICD-9-CM   1. Status post bariatric surgery  Z98.84 V45.86   2. Fatigue, unspecified type  R53.83 780.79   3. Screening, iron deficiency anemia  Z13.0 V78.0   4. Hypovitaminosis D  E55.9 268.9   5. Postgastrectomy malabsorption  K91.2 579.3    Z90.3    6. Malnutrition screen  Z13.21 V77.2   7. Obesity, Class II, BMI 35-39.9  E66.9 278.00         Plan:  Will refer to GI for cirrhosis and cardiology per pt request with h/o CAD. Refer to medical weight management. She is interested in revision and will fill out a packet. Offered dietician consult.  Continue w/ good food choices and healthy habits.  Continue to focus on high protein, low carb.  Start tracking intake.  Encouraged routine exercise.  Routine bariatric labs ordered.  vitamin recs  pending lab results.  Call w/ problems/concerns.     Class 2 Severe Obesity (BMI >=35 and <=39.9). Obesity-related health conditions " include the following: as above. Obesity is worsening. BMI is is above average; BMI management plan is completed. We discussed low calorie, low carb based diet program, portion control and increasing exercise.        The patient was instructed to follow up in 3 months, sooner if needed.

## 2022-06-13 LAB
25(OH)D3+25(OH)D2 SERPL-MCNC: 26.4 NG/ML (ref 30–100)
ALBUMIN SERPL-MCNC: 4.3 G/DL (ref 3.8–4.9)
ALBUMIN/GLOB SERPL: 1.5 {RATIO} (ref 1.2–2.2)
ALP SERPL-CCNC: 105 IU/L (ref 44–121)
ALT SERPL-CCNC: 25 IU/L (ref 0–32)
AST SERPL-CCNC: 23 IU/L (ref 0–40)
BASOPHILS # BLD AUTO: 0 X10E3/UL (ref 0–0.2)
BASOPHILS NFR BLD AUTO: 1 %
BILIRUB SERPL-MCNC: 0.3 MG/DL (ref 0–1.2)
BUN SERPL-MCNC: 13 MG/DL (ref 6–24)
BUN/CREAT SERPL: 18 (ref 9–23)
CALCIUM SERPL-MCNC: 9.3 MG/DL (ref 8.7–10.2)
CHLORIDE SERPL-SCNC: 103 MMOL/L (ref 96–106)
CO2 SERPL-SCNC: 24 MMOL/L (ref 20–29)
CREAT SERPL-MCNC: 0.72 MG/DL (ref 0.57–1)
EGFRCR SERPLBLD CKD-EPI 2021: 99 ML/MIN/1.73
EOSINOPHIL # BLD AUTO: 0.2 X10E3/UL (ref 0–0.4)
EOSINOPHIL NFR BLD AUTO: 2 %
ERYTHROCYTE [DISTWIDTH] IN BLOOD BY AUTOMATED COUNT: 14.2 % (ref 11.7–15.4)
FERRITIN SERPL-MCNC: 14 NG/ML (ref 15–150)
FOLATE SERPL-MCNC: 8 NG/ML
GLOBULIN SER CALC-MCNC: 2.9 G/DL (ref 1.5–4.5)
GLUCOSE SERPL-MCNC: 180 MG/DL (ref 65–99)
HCT VFR BLD AUTO: 35 % (ref 34–46.6)
HGB BLD-MCNC: 11 G/DL (ref 11.1–15.9)
IMM GRANULOCYTES # BLD AUTO: 0 X10E3/UL (ref 0–0.1)
IMM GRANULOCYTES NFR BLD AUTO: 0 %
IRON SERPL-MCNC: 36 UG/DL (ref 27–159)
LYMPHOCYTES # BLD AUTO: 2.7 X10E3/UL (ref 0.7–3.1)
LYMPHOCYTES NFR BLD AUTO: 38 %
MCH RBC QN AUTO: 25.2 PG (ref 26.6–33)
MCHC RBC AUTO-ENTMCNC: 31.4 G/DL (ref 31.5–35.7)
MCV RBC AUTO: 80 FL (ref 79–97)
METHYLMALONATE SERPL-SCNC: 133 NMOL/L (ref 0–378)
MONOCYTES # BLD AUTO: 0.6 X10E3/UL (ref 0.1–0.9)
MONOCYTES NFR BLD AUTO: 8 %
NEUTROPHILS # BLD AUTO: 3.6 X10E3/UL (ref 1.4–7)
NEUTROPHILS NFR BLD AUTO: 51 %
PLATELET # BLD AUTO: 133 X10E3/UL (ref 150–450)
POTASSIUM SERPL-SCNC: 4.2 MMOL/L (ref 3.5–5.2)
PREALB SERPL-MCNC: 21 MG/DL (ref 10–36)
PROT SERPL-MCNC: 7.2 G/DL (ref 6–8.5)
RBC # BLD AUTO: 4.37 X10E6/UL (ref 3.77–5.28)
SODIUM SERPL-SCNC: 141 MMOL/L (ref 134–144)
VIT B1 BLD-SCNC: 102.7 NMOL/L (ref 66.5–200)
WBC # BLD AUTO: 7.1 X10E3/UL (ref 3.4–10.8)

## 2022-06-28 ENCOUNTER — OFFICE VISIT (OUTPATIENT)
Dept: GASTROENTEROLOGY | Facility: CLINIC | Age: 57
End: 2022-06-28

## 2022-06-28 ENCOUNTER — LAB (OUTPATIENT)
Dept: LAB | Facility: HOSPITAL | Age: 57
End: 2022-06-28

## 2022-06-28 VITALS
HEART RATE: 72 BPM | SYSTOLIC BLOOD PRESSURE: 142 MMHG | OXYGEN SATURATION: 99 % | HEIGHT: 66 IN | DIASTOLIC BLOOD PRESSURE: 72 MMHG | TEMPERATURE: 97.8 F | WEIGHT: 223.4 LBS | BODY MASS INDEX: 35.9 KG/M2

## 2022-06-28 DIAGNOSIS — K74.60 HEPATIC CIRRHOSIS, UNSPECIFIED HEPATIC CIRRHOSIS TYPE, UNSPECIFIED WHETHER ASCITES PRESENT: Primary | ICD-10-CM

## 2022-06-28 DIAGNOSIS — K74.60 HEPATIC CIRRHOSIS, UNSPECIFIED HEPATIC CIRRHOSIS TYPE, UNSPECIFIED WHETHER ASCITES PRESENT: ICD-10-CM

## 2022-06-28 LAB
ALBUMIN SERPL-MCNC: 4.4 G/DL (ref 3.5–5.2)
ALBUMIN/GLOB SERPL: 1.4 G/DL
ALP SERPL-CCNC: 119 U/L (ref 39–117)
ALT SERPL W P-5'-P-CCNC: 31 U/L (ref 1–33)
ANION GAP SERPL CALCULATED.3IONS-SCNC: 11.4 MMOL/L (ref 5–15)
AST SERPL-CCNC: 36 U/L (ref 1–32)
BILIRUB SERPL-MCNC: 0.3 MG/DL (ref 0–1.2)
BUN SERPL-MCNC: 12 MG/DL (ref 6–20)
BUN/CREAT SERPL: 15.8 (ref 7–25)
CALCIUM SPEC-SCNC: 9.6 MG/DL (ref 8.6–10.5)
CHLORIDE SERPL-SCNC: 101 MMOL/L (ref 98–107)
CO2 SERPL-SCNC: 25.6 MMOL/L (ref 22–29)
CREAT SERPL-MCNC: 0.76 MG/DL (ref 0.57–1)
DEPRECATED RDW RBC AUTO: 39.7 FL (ref 37–54)
EGFRCR SERPLBLD CKD-EPI 2021: 92.7 ML/MIN/1.73
ERYTHROCYTE [DISTWIDTH] IN BLOOD BY AUTOMATED COUNT: 13.9 % (ref 12.3–15.4)
GLOBULIN UR ELPH-MCNC: 3.2 GM/DL
GLUCOSE SERPL-MCNC: 208 MG/DL (ref 65–99)
HAV IGM SERPL QL IA: NORMAL
HBV CORE IGM SERPL QL IA: NORMAL
HBV SURFACE AB SER RIA-ACNC: REACTIVE
HBV SURFACE AG SERPL QL IA: NORMAL
HCT VFR BLD AUTO: 34.4 % (ref 34–46.6)
HCV AB SER DONR QL: NORMAL
HGB BLD-MCNC: 11 G/DL (ref 12–15.9)
IGA1 MFR SER: 370 MG/DL (ref 70–400)
IGG1 SER-MCNC: 1194 MG/DL (ref 700–1600)
IGM SERPL-MCNC: 266 MG/DL (ref 40–230)
MCH RBC QN AUTO: 25.3 PG (ref 26.6–33)
MCHC RBC AUTO-ENTMCNC: 32 G/DL (ref 31.5–35.7)
MCV RBC AUTO: 79.3 FL (ref 79–97)
PLATELET # BLD AUTO: 158 10*3/MM3 (ref 140–450)
PMV BLD AUTO: 9.2 FL (ref 6–12)
POTASSIUM SERPL-SCNC: 4.4 MMOL/L (ref 3.5–5.2)
PROT SERPL-MCNC: 7.6 G/DL (ref 6–8.5)
RBC # BLD AUTO: 4.34 10*6/MM3 (ref 3.77–5.28)
SODIUM SERPL-SCNC: 138 MMOL/L (ref 136–145)
WBC NRBC COR # BLD: 7.35 10*3/MM3 (ref 3.4–10.8)

## 2022-06-28 PROCEDURE — 86015 ACTIN ANTIBODY EACH: CPT

## 2022-06-28 PROCEDURE — 86038 ANTINUCLEAR ANTIBODIES: CPT

## 2022-06-28 PROCEDURE — 85027 COMPLETE CBC AUTOMATED: CPT

## 2022-06-28 PROCEDURE — 80074 ACUTE HEPATITIS PANEL: CPT

## 2022-06-28 PROCEDURE — 86706 HEP B SURFACE ANTIBODY: CPT

## 2022-06-28 PROCEDURE — 80053 COMPREHEN METABOLIC PANEL: CPT

## 2022-06-28 PROCEDURE — 86704 HEP B CORE ANTIBODY TOTAL: CPT

## 2022-06-28 PROCEDURE — 82784 ASSAY IGA/IGD/IGG/IGM EACH: CPT

## 2022-06-28 PROCEDURE — 86381 MITOCHONDRIAL ANTIBODY EACH: CPT

## 2022-06-28 PROCEDURE — 99244 OFF/OP CNSLTJ NEW/EST MOD 40: CPT | Performed by: INTERNAL MEDICINE

## 2022-06-28 NOTE — PROGRESS NOTES
PCP: Crut Michaud DO    Chief Complaint   Patient presents with   • Cirrhosis        History of Present Illness:   Alee Wu is a 56 y.o. female who presents to the GI clinic as a consult for steatohepatitis with cirrhosis. S/p liver biopsy  showing steatohepatitis with cirrhosis. H/o gerd, chronic back pain, obesity, s/p gastric sleeve. Denies confusion, jaundice, ascites. + fatigue. Occasional gerd on ppi therapy.    Past Medical History:   Diagnosis Date   • Anxiety    • Arthritis Not sure   • CAD (coronary artery disease)     s/p stenting , on ASA + Plavix, follows w/ Dr. Walters   • Chronic back pain     on Norco daily, denies NSAIDS/steroids   • Chronic narcotic use     managed by PCP   • COPD (chronic obstructive pulmonary disease) (HCC)    • Depression    • Diabetes (HCC)     dx , never on insulin, A1C 9.4   • Dyspepsia    • Dyspnea on exertion    • Edema    • Fatigue    • Former smoker     quit    • Gastric bezoar     History endoscopic removal Dr. Fernández 2019   • GERD (gastroesophageal reflux disease)     controlled on Omeprazole BID, recent EGD 3/2020 w/ Dr. Fernández   • Hepatosplenomegaly     By CT scan   • History of blood transfusion     With CABG    • Hyperlipidemia    • Hypertension    • Insomnia    • Joint pain     rheumatology eval (P)   • Liver cirrhosis secondary to NARANJO (HCC)     follows w/ Dr. Fernández in [place].  MELD 8   • MI (myocardial infarction) (HCC)    • Microcytic anemia    • Morbid obesity (HCC)    • Neuropathy     on Amitriptyline + Carbamazapine   • RLS (restless legs syndrome)    • Sleep apnea     CPAP noncompliant last 6 months as above   • Tobacco use disorder, severe, in early remission    • Varices of spleen     And peritoneum CT scan 2020       Past Surgical History:   Procedure Laterality Date   •  SECTION     •  SECTION     • COLONOSCOPY     • CORONARY ANGIOPLASTY WITH STENT PLACEMENT     • CORONARY ARTERY  BYPASS GRAFT  2010    2 units of packed red blood cells given   • ENDOMETRIAL ABLATION  2007   • ENDOSCOPY  2020    w/ Dr. Fernández   • ENDOSCOPY N/A 01/20/2021    Procedure: ESOPHAGOGASTRODUODENOSCOPY;  Surgeon: Raymundo Mcnamara MD;  Location:  [facility];  Service: Bariatric;  Laterality: N/A;   • GASTRIC SLEEVE LAPAROSCOPIC N/A 01/20/2021    Procedure: GASTRIC SLEEVE LAPAROSCOPIC;  Surgeon: Raymundo Mcnamara MD;  Location:  [facility];  Service: Bariatric;  Laterality: N/A;   • KNEE SURGERY Right 2015    Laparoscopic meniscus repair   • LAPAROSCOPIC CHOLECYSTECTOMY  2016    dysfunction, no stones         Current Outpatient Medications:   •  aspirin 81 MG EC tablet, Take 81 mg by mouth Daily., Disp: , Rfl:   •  atorvastatin (LIPITOR) 40 MG tablet, Take 40 mg by mouth Daily., Disp: , Rfl:   •  cetirizine (zyrTEC) 10 MG tablet, , Disp: , Rfl:   •  Cholecalciferol 1.25 MG (08921 UT) tablet, Take 1 tablet by mouth Every 7 (Seven) Days for 12 doses., Disp: 12 tablet, Rfl: 0  •  FLUoxetine (PROzac) 40 MG capsule, Take 40 mg by mouth 2 (Two) Times a Day., Disp: , Rfl:   •  fluticasone (FLONASE) 50 MCG/ACT nasal spray, , Disp: , Rfl:   •  FREESTYLE LITE test strip, , Disp: , Rfl:   •  ibuprofen (ADVIL,MOTRIN) 800 MG tablet, , Disp: , Rfl:   •  isosorbide mononitrate (IMDUR) 60 MG 24 hr tablet, Take 60 mg by mouth Daily. For chest pain., Disp: , Rfl:   •  Lancets (freestyle) lancets, , Disp: , Rfl:   •  lisinopril (PRINIVIL,ZESTRIL) 5 MG tablet, Take 5 mg by mouth Daily., Disp: , Rfl:   •  loratadine (CLARITIN) 10 MG tablet, Take 10 mg by mouth Daily As Needed for Allergies., Disp: , Rfl:   •  metFORMIN (GLUCOPHAGE) 1000 MG tablet, , Disp: , Rfl:   •  metoprolol tartrate (LOPRESSOR) 100 MG tablet, Take 100 mg by mouth 2 (Two) Times a Day., Disp: , Rfl:   •  montelukast (SINGULAIR) 10 MG tablet, Take 10 mg by mouth At Night As Needed (allergies)., Disp: , Rfl:   •  nitroglycerin (NITROSTAT) 0.4 MG SL tablet, Place 0.4 mg  under the tongue Every 5 (Five) Minutes As Needed for Chest Pain. Take no more than 3 doses in 15 minutes., Disp: , Rfl:   •  omeprazole (priLOSEC) 20 MG capsule, , Disp: , Rfl:   •  omeprazole (priLOSEC) 40 MG capsule, Take 1 capsule by mouth 2 (two) times a day., Disp: 60 capsule, Rfl: 3  •  rOPINIRole (REQUIP) 2 MG tablet, Take 2 mg by mouth Every Night. Restless leg., Disp: , Rfl:   •  zolpidem (AMBIEN) 5 MG tablet, Take 5 mg by mouth At Night As Needed for Sleep., Disp: , Rfl:   •  mirtazapine (REMERON) 15 MG tablet, Take 15 mg by mouth Every Night., Disp: , Rfl:     No Known Allergies    Family History   Problem Relation Age of Onset   • Colon polyps Mother    • Obesity Mother    • Hypertension Mother    • Heart disease Mother    • Arthritis Mother    • Hypertension Father    • Heart disease Father    • Heart attack Father    • Obesity Father    • Obesity Sister    • Diabetes Sister    • Hypertension Sister    • Kidney disease Sister    • COPD Maternal Grandmother    • Obesity Maternal Grandmother    • Hypertension Maternal Grandmother    • Stroke Maternal Grandmother    • Cirrhosis Maternal Grandfather    • Hypertension Paternal Grandmother    • Obesity Paternal Grandmother    • Cancer Paternal Grandfather        Social History     Socioeconomic History   • Marital status:    Tobacco Use   • Smoking status: Former Smoker     Packs/day: 1.50     Years: 30.00     Pack years: 45.00     Types: Cigarettes     Start date: 1980     Quit date: 2016     Years since quittin.8   • Smokeless tobacco: Never Used   Vaping Use   • Vaping Use: Never used   Substance and Sexual Activity   • Alcohol use: Never   • Drug use: Never   • Sexual activity: Yes     Partners: Female     Birth control/protection: Post-menopausal       Review of Systems  All other systems reviewed and are negative.    Vitals:    22 1427   BP: 142/72   Pulse: 72   Temp: 97.8 °F (36.6 °C)   SpO2: 99%       Physical Exam  General  Appearance:  Vitals as above. no acute distress  Head/face:  Normocephalic, atraumatic  Eyes:   EOMI, no conjunctivitis or icterus   Nose/Sinuses:  Nares patent bilaterally without discharge or lesions  Mouth/Throat:  Normal oral movements without dyskinesia  Neck:  trachea is midline, no thyromegaly  Lungs:  Normal work of breathing effort, no overt rales  Heart:  No overt JVD or type VI murmur  Abdomen:  Nondistended, no guarding or rebound tenderness  Neurologic:  Alert; no focal deficits; age appropriate behavior and speech  Psychiatric: mood and affect are congruent  Vascular: extremities without edema  Skin: no rash or cyanosis.  High bmi    Assessment/Plan  1.) Cirrhosis, mas  Previously followed with Dr. Fernández who has retired  Etiology: MAS, will complete serologic profile  MELD: < 15  No signs of decompensation  Plan:  EGD  U/s abdomen  MELD Labs  Serologic injury profile  Avoid nsaids    2.) Obesity  3.) Steatohepatitis  Recommend exercise and wt loss    4.) Fatigue  Likely mas/cirrhosis contributing  Recommend good sleep hygiene    5.) GERD  Continue ppi    6.) HCM  Last colonoscopy by dr. Fernández, will consider repeating screening based upon records    7.) CAD/ s/p mi  Per cardiology  Joe Bhandari MD  6/28/2022

## 2022-06-29 LAB
ANA SER QL: NEGATIVE
HAV AB SER QL IA: NEGATIVE
HBV CORE AB SERPL QL IA: NEGATIVE
MITOCHONDRIA M2 IGG SER-ACNC: <20 UNITS (ref 0–20)
SMA IGG SER-ACNC: 7 UNITS (ref 0–19)

## 2022-07-05 DIAGNOSIS — Z01.818 PREOP EXAMINATION: Primary | ICD-10-CM

## 2022-07-08 ENCOUNTER — OUTSIDE FACILITY SERVICE (OUTPATIENT)
Dept: GASTROENTEROLOGY | Facility: CLINIC | Age: 57
End: 2022-07-08

## 2022-07-08 ENCOUNTER — HOSPITAL ENCOUNTER (OUTPATIENT)
Dept: CARDIOLOGY | Facility: HOSPITAL | Age: 57
Discharge: HOME | End: 2022-07-08
Admitting: INTERNAL MEDICINE

## 2022-07-08 ENCOUNTER — TRANSCRIBE ORDERS (OUTPATIENT)
Dept: CARDIOLOGY | Facility: HOSPITAL | Age: 57
End: 2022-07-08

## 2022-07-08 DIAGNOSIS — R00.0 HEART RATE FAST: Primary | ICD-10-CM

## 2022-07-08 DIAGNOSIS — R00.0 HEART RATE FAST: ICD-10-CM

## 2022-07-08 PROCEDURE — 93005 ELECTROCARDIOGRAM TRACING: CPT | Performed by: INTERNAL MEDICINE

## 2022-07-08 PROCEDURE — 93010 ELECTROCARDIOGRAM REPORT: CPT | Performed by: INTERNAL MEDICINE

## 2022-07-08 PROCEDURE — 43235 EGD DIAGNOSTIC BRUSH WASH: CPT | Performed by: INTERNAL MEDICINE

## 2022-07-21 ENCOUNTER — HOSPITAL ENCOUNTER (OUTPATIENT)
Dept: ULTRASOUND IMAGING | Facility: HOSPITAL | Age: 57
End: 2022-07-21

## 2022-08-13 ENCOUNTER — APPOINTMENT (OUTPATIENT)
Dept: ULTRASOUND IMAGING | Facility: HOSPITAL | Age: 57
End: 2022-08-13

## 2022-09-12 RX ORDER — CHOLECALCIFEROL (VITAMIN D3) 1250 MCG
CAPSULE ORAL
Qty: 12 CAPSULE | OUTPATIENT
Start: 2022-09-12

## 2022-10-05 ENCOUNTER — OUTSIDE FACILITY SERVICE (OUTPATIENT)
Dept: CARDIOLOGY | Facility: CLINIC | Age: 57
End: 2022-10-05

## 2022-10-05 PROCEDURE — 93306 TTE W/DOPPLER COMPLETE: CPT | Performed by: INTERNAL MEDICINE

## 2022-11-07 LAB
QT INTERVAL: 408 MS
QTC INTERVAL: 449 MS

## 2022-11-08 ENCOUNTER — OFFICE VISIT (OUTPATIENT)
Dept: CARDIOLOGY | Facility: CLINIC | Age: 57
End: 2022-11-08

## 2022-11-08 VITALS
DIASTOLIC BLOOD PRESSURE: 69 MMHG | SYSTOLIC BLOOD PRESSURE: 131 MMHG | HEART RATE: 80 BPM | WEIGHT: 223 LBS | HEIGHT: 66 IN | BODY MASS INDEX: 35.84 KG/M2

## 2022-11-08 DIAGNOSIS — I10 HYPERTENSION, ESSENTIAL: ICD-10-CM

## 2022-11-08 DIAGNOSIS — I25.118 CORONARY ARTERY DISEASE OF NATIVE ARTERY OF NATIVE HEART WITH STABLE ANGINA PECTORIS: Primary | ICD-10-CM

## 2022-11-08 DIAGNOSIS — E78.2 HYPERLIPEMIA, MIXED: ICD-10-CM

## 2022-11-08 PROCEDURE — 99204 OFFICE O/P NEW MOD 45 MIN: CPT | Performed by: INTERNAL MEDICINE

## 2022-11-08 RX ORDER — RANOLAZINE 500 MG/1
500 TABLET, EXTENDED RELEASE ORAL 2 TIMES DAILY
Qty: 180 TABLET | Refills: 3 | Status: SHIPPED | OUTPATIENT
Start: 2022-11-08

## 2022-11-08 RX ORDER — ISOSORBIDE MONONITRATE 60 MG/1
90 TABLET, EXTENDED RELEASE ORAL DAILY
Qty: 135 TABLET | Refills: 3 | Status: SHIPPED | OUTPATIENT
Start: 2022-11-08

## 2022-11-08 NOTE — PROGRESS NOTES
Chief Complaint  Chest Pain (Pt states she takes a nitro when this occurs last 7 days, bp/hr high when this happens)    Subjective            Alee Wu presents to [facility] CARDIOLOGY  History of Present Illness    56-year-old white female.  She has known coronary artery disease.  She had CABG in 2010 and then a couple of additional PCI procedures 2015, 2017 according to the patient.  These were done at [facility].  She recently had COVID and in the setting of acute respiratory illness went to the hospital.  Her EKG was abnormal showing diffuse ST depression.  She had taken a couple of extra nitroglycerin the day of admission as well after moving some boxes.  She was markedly hypertensive and felt poorly.  She ruled out for ACS.  Her echocardiogram showed moderate LVH with trace mitral and tricuspid regurgitation.  She was managed medically and scheduled for establishing care with me in the office.  She has not followed up with cardiology in the last couple of years.    PMH  Past Medical History:   Diagnosis Date   • Anxiety    • Arthritis Not sure   • CAD (coronary artery disease)     s/p stenting 2017, on ASA + Plavix, follows w/ Dr. Walters   • Chronic back pain     on Norco daily, denies NSAIDS/steroids   • Chronic narcotic use     managed by PCP   • COPD (chronic obstructive pulmonary disease) (HCC)    • Depression    • Diabetes (HCC)     dx 2017, never on insulin, A1C 9.4   • Dyspepsia    • Dyspnea on exertion    • Edema    • Fatigue    • Former smoker     quit 2018   • Gastric bezoar     History endoscopic removal Dr. Fernández 7/24/2019   • GERD (gastroesophageal reflux disease)     controlled on Omeprazole BID, recent EGD 3/2020 w/ Dr. Fernández   • Hepatosplenomegaly     By CT scan   • History of blood transfusion     With CABG    • Hyperlipidemia    • Hypertension    • Insomnia    • Joint pain     rheumatology eval (P)   • Liver cirrhosis secondary to NARANJO (HCC)     follows w/ Dr. Fernández  in [place].  MELD 8   • MI (myocardial infarction) (HCC)    • Microcytic anemia    • Morbid obesity (HCC)    • Neuropathy     on Amitriptyline + Carbamazapine   • RLS (restless legs syndrome)    • Sleep apnea     CPAP noncompliant last 6 months as above   • Tobacco use disorder, severe, in early remission    • Varices of spleen     And peritoneum CT scan 2020         SURGICALHX  Past Surgical History:   Procedure Laterality Date   •  SECTION     •  SECTION     • COLONOSCOPY     • CORONARY ANGIOPLASTY WITH STENT PLACEMENT     • CORONARY ARTERY BYPASS GRAFT      2 units of packed red blood cells given   • ENDOMETRIAL ABLATION     • ENDOSCOPY      w/ Dr. Fernández   • ENDOSCOPY N/A 2021    Procedure: ESOPHAGOGASTRODUODENOSCOPY;  Surgeon: Raymundo Mcnamara MD;  Location:  [facility];  Service: Bariatric;  Laterality: N/A;   • GASTRIC SLEEVE LAPAROSCOPIC N/A 2021    Procedure: GASTRIC SLEEVE LAPAROSCOPIC;  Surgeon: Raymundo Mcnamara MD;  Location:  [facility];  Service: Bariatric;  Laterality: N/A;   • KNEE SURGERY Right 2015    Laparoscopic meniscus repair   • LAPAROSCOPIC CHOLECYSTECTOMY  2016    dysfunction, no stones        SOC  Social History     Socioeconomic History   • Marital status:    Tobacco Use   • Smoking status: Former     Packs/day: 1.50     Years: 30.00     Pack years: 45.00     Types: Cigarettes     Start date: 1980     Quit date: 2016     Years since quittin.1   • Smokeless tobacco: Never   Vaping Use   • Vaping Use: Never used   Substance and Sexual Activity   • Alcohol use: Never   • Drug use: Never   • Sexual activity: Yes     Partners: Female     Birth control/protection: Post-menopausal         FAMHX  Family History   Problem Relation Age of Onset   • Colon polyps Mother    • Obesity Mother    • Hypertension Mother    • Heart disease Mother    • Arthritis Mother    • Hypertension Father    • Heart disease  Father    • Heart attack Father    • Obesity Father    • Obesity Sister    • Diabetes Sister    • Hypertension Sister    • Kidney disease Sister    • COPD Maternal Grandmother    • Obesity Maternal Grandmother    • Hypertension Maternal Grandmother    • Stroke Maternal Grandmother    • Cirrhosis Maternal Grandfather    • Hypertension Paternal Grandmother    • Obesity Paternal Grandmother    • Cancer Paternal Grandfather           ALLERGY  No Known Allergies     MEDSCURRENT    Current Outpatient Medications:   •  aspirin 81 MG EC tablet, Take 81 mg by mouth Daily., Disp: , Rfl:   •  atorvastatin (LIPITOR) 40 MG tablet, Take 40 mg by mouth Daily., Disp: , Rfl:   •  cetirizine (zyrTEC) 10 MG tablet, , Disp: , Rfl:   •  FLUoxetine (PROzac) 40 MG capsule, Take 40 mg by mouth 2 (Two) Times a Day., Disp: , Rfl:   •  fluticasone (FLONASE) 50 MCG/ACT nasal spray, , Disp: , Rfl:   •  FREESTYLE LITE test strip, , Disp: , Rfl:   •  ibuprofen (ADVIL,MOTRIN) 800 MG tablet, , Disp: , Rfl:   •  isosorbide mononitrate (IMDUR) 60 MG 24 hr tablet, Take 1.5 tablets by mouth Daily. For chest pain., Disp: 135 tablet, Rfl: 3  •  Lancets (freestyle) lancets, , Disp: , Rfl:   •  lisinopril (PRINIVIL,ZESTRIL) 5 MG tablet, Take 5 mg by mouth Daily., Disp: , Rfl:   •  loratadine (CLARITIN) 10 MG tablet, Take 10 mg by mouth Daily As Needed for Allergies., Disp: , Rfl:   •  metFORMIN (GLUCOPHAGE) 1000 MG tablet, , Disp: , Rfl:   •  metoprolol tartrate (LOPRESSOR) 100 MG tablet, Take 100 mg by mouth 2 (Two) Times a Day., Disp: , Rfl:   •  montelukast (SINGULAIR) 10 MG tablet, Take 10 mg by mouth At Night As Needed (allergies)., Disp: , Rfl:   •  nitroglycerin (NITROSTAT) 0.4 MG SL tablet, Place 0.4 mg under the tongue Every 5 (Five) Minutes As Needed for Chest Pain. Take no more than 3 doses in 15 minutes., Disp: , Rfl:   •  omeprazole (priLOSEC) 20 MG capsule, , Disp: , Rfl:   •  omeprazole (priLOSEC) 40 MG capsule, Take 1 capsule by mouth 2  "(two) times a day., Disp: 60 capsule, Rfl: 3  •  rOPINIRole (REQUIP) 2 MG tablet, Take 2 mg by mouth Every Night. Restless leg., Disp: , Rfl:   •  zolpidem (AMBIEN) 5 MG tablet, Take 5 mg by mouth At Night As Needed for Sleep., Disp: , Rfl:   •  mirtazapine (REMERON) 15 MG tablet, Take 15 mg by mouth Every Night., Disp: , Rfl:   •  ranolazine (Ranexa) 500 MG 12 hr tablet, Take 1 tablet by mouth 2 (Two) Times a Day., Disp: 180 tablet, Rfl: 3      Review of Systems   Constitutional: Positive for malaise/fatigue.   HENT: Negative.    Eyes: Negative.    Cardiovascular: Positive for chest pain and dyspnea on exertion.   Respiratory: Positive for shortness of breath.    Endocrine: Negative.    Hematologic/Lymphatic: Negative.    Skin: Negative.    Musculoskeletal: Negative.    Gastrointestinal: Negative.    Genitourinary: Negative.    Neurological: Negative.    Psychiatric/Behavioral: Negative.         Objective     /69   Pulse 80   Ht 167.6 cm (65.98\")   Wt 101 kg (223 lb)   BMI 36.01 kg/m²       General Appearance:   · well developed  · well nourished  HENT:   · oropharynx moist  · lips not cyanotic  Neck:  · thyroid not enlarged  · supple  Respiratory:  · no respiratory distress  · normal breath sounds  · no rales  Cardiovascular:  · no jugular venous distention  · regular rhythm  · apical impulse normal  · S1 normal, S2 normal  · no S3, no S4   · Grade 2 parasternal systolic murmur  · no rub, no thrill  · carotid pulses normal; no bruit  · pedal pulses normal  · lower extremity edema: none    Musculoskeletal:  · no clubbing of fingers.   · normocephalic, head atraumatic  Skin:   · warm, dry  Psychiatric:  · judgement and insight appropriate  · normal mood and affect      Result Review :             Data reviewed: Hospital records reviewed, echocardiogram reviewed.  EKG personally reviewed by me as above.     Procedures                   Assessment and Plan        ASSESSMENT:  Encounter Diagnoses   Name " Primary?   • Coronary artery disease of native artery of native heart with stable angina pectoris (HCC) Yes   • Hypertension, essential    • Hyperlipemia, mixed          PLAN:    1.  Coronary artery disease, the patient has had some anginal chest pain recently.  It does not seem unstable at this point.  She has not recently had an ischemia evaluation.  There is a handwritten report of her catheterization from 2020 and at that point it looked like her native LAD and RCA were occluded, and possibly her SIMI anastomosis is occluded along with 1 SVG.  We are going to get records from [facility] to get the long form reports.  Continue current medical therapy, I am increasing her Imdur to 90 mg daily and adding Ranexa 500 mg twice daily.  2.  Will discuss further diagnostic evaluation pending my review of her last catheterization anatomy and whether she has a favorable response to the medical therapy adjustments.  3.  Routine follow-up in 3 months will be set as well          Patient was given instructions and counseling regarding her condition or for health maintenance advice. Please see specific information pulled into the AVS if appropriate.             EFE Linton MD  11/8/2022    12:25 EST

## 2023-08-06 ENCOUNTER — LAB (OUTPATIENT)
Dept: LAB | Facility: HOSPITAL | Age: 58
End: 2023-08-06
Payer: COMMERCIAL

## 2023-08-06 ENCOUNTER — HOSPITAL ENCOUNTER (OUTPATIENT)
Dept: ULTRASOUND IMAGING | Facility: HOSPITAL | Age: 58
Discharge: HOME | End: 2023-08-06
Payer: COMMERCIAL

## 2023-08-06 DIAGNOSIS — K74.60 HEPATIC CIRRHOSIS, UNSPECIFIED HEPATIC CIRRHOSIS TYPE, UNSPECIFIED WHETHER ASCITES PRESENT: ICD-10-CM

## 2023-08-06 LAB
ALBUMIN SERPL-MCNC: 4.5 G/DL (ref 3.5–5.2)
ALBUMIN/GLOB SERPL: 1.7 G/DL
ALP SERPL-CCNC: 94 U/L (ref 39–117)
ALT SERPL W P-5'-P-CCNC: 17 U/L (ref 1–33)
ANION GAP SERPL CALCULATED.3IONS-SCNC: 12.5 MMOL/L (ref 5–15)
AST SERPL-CCNC: 24 U/L (ref 1–32)
BILIRUB SERPL-MCNC: 0.5 MG/DL (ref 0–1.2)
BUN SERPL-MCNC: 15 MG/DL (ref 6–20)
BUN/CREAT SERPL: 16.9 (ref 7–25)
CALCIUM SPEC-SCNC: 9.7 MG/DL (ref 8.6–10.5)
CHLORIDE SERPL-SCNC: 107 MMOL/L (ref 98–107)
CO2 SERPL-SCNC: 25.5 MMOL/L (ref 22–29)
CREAT SERPL-MCNC: 0.89 MG/DL (ref 0.57–1)
DEPRECATED RDW RBC AUTO: 40.2 FL (ref 37–54)
EGFRCR SERPLBLD CKD-EPI 2021: 76.2 ML/MIN/1.73
ERYTHROCYTE [DISTWIDTH] IN BLOOD BY AUTOMATED COUNT: 13.7 % (ref 12.3–15.4)
GLOBULIN UR ELPH-MCNC: 2.7 GM/DL
GLUCOSE SERPL-MCNC: 81 MG/DL (ref 65–99)
HCT VFR BLD AUTO: 40 % (ref 34–46.6)
HGB BLD-MCNC: 13.1 G/DL (ref 12–15.9)
INR PPP: 1.08 (ref 0.89–1.12)
MCH RBC QN AUTO: 27 PG (ref 26.6–33)
MCHC RBC AUTO-ENTMCNC: 32.8 G/DL (ref 31.5–35.7)
MCV RBC AUTO: 82.5 FL (ref 79–97)
PLATELET # BLD AUTO: 130 10*3/MM3 (ref 140–450)
PMV BLD AUTO: 9 FL (ref 6–12)
POTASSIUM SERPL-SCNC: 4 MMOL/L (ref 3.5–5.2)
PROT SERPL-MCNC: 7.2 G/DL (ref 6–8.5)
PROTHROMBIN TIME: 14.1 SECONDS (ref 12.2–14.5)
RBC # BLD AUTO: 4.85 10*6/MM3 (ref 3.77–5.28)
SODIUM SERPL-SCNC: 145 MMOL/L (ref 136–145)
WBC NRBC COR # BLD: 6.17 10*3/MM3 (ref 3.4–10.8)

## 2023-08-06 PROCEDURE — 80053 COMPREHEN METABOLIC PANEL: CPT

## 2023-08-06 PROCEDURE — 85610 PROTHROMBIN TIME: CPT

## 2023-08-06 PROCEDURE — 76700 US EXAM ABDOM COMPLETE: CPT

## 2023-08-06 PROCEDURE — 85027 COMPLETE CBC AUTOMATED: CPT

## 2024-08-08 ENCOUNTER — HOSPITAL ENCOUNTER (OUTPATIENT)
Facility: HOSPITAL | Age: 59
Discharge: HOME | End: 2024-08-11
Attending: INTERNAL MEDICINE | Admitting: STUDENT IN AN ORGANIZED HEALTH CARE EDUCATION/TRAINING PROGRAM
Payer: COMMERCIAL

## 2024-08-08 DIAGNOSIS — I20.0 UNSTABLE ANGINA: Primary | ICD-10-CM

## 2024-08-08 PROBLEM — R07.9 CHEST PAIN: Status: ACTIVE | Noted: 2024-08-08

## 2024-08-08 PROBLEM — E83.42 HYPOMAGNESEMIA: Status: ACTIVE | Noted: 2024-08-08

## 2024-08-08 LAB
ALBUMIN SERPL-MCNC: 4.2 G/DL (ref 3.5–5.2)
ALBUMIN/GLOB SERPL: 1.4 G/DL
ALP SERPL-CCNC: 91 U/L (ref 39–117)
ALT SERPL W P-5'-P-CCNC: 13 U/L (ref 1–33)
ANION GAP SERPL CALCULATED.3IONS-SCNC: 11 MMOL/L (ref 5–15)
APTT PPP: 30.8 SECONDS (ref 60–90)
AST SERPL-CCNC: 17 U/L (ref 1–32)
BASOPHILS # BLD AUTO: 0.03 10*3/MM3 (ref 0–0.2)
BASOPHILS NFR BLD AUTO: 0.5 % (ref 0–1.5)
BILIRUB SERPL-MCNC: 0.6 MG/DL (ref 0–1.2)
BUN SERPL-MCNC: 21 MG/DL (ref 6–20)
BUN/CREAT SERPL: 30 (ref 7–25)
CALCIUM SPEC-SCNC: 8.8 MG/DL (ref 8.6–10.5)
CHLORIDE SERPL-SCNC: 104 MMOL/L (ref 98–107)
CO2 SERPL-SCNC: 23 MMOL/L (ref 22–29)
CREAT SERPL-MCNC: 0.7 MG/DL (ref 0.57–1)
D DIMER PPP FEU-MCNC: <0.27 MCGFEU/ML (ref 0–0.57)
DEPRECATED RDW RBC AUTO: 38 FL (ref 37–54)
EGFRCR SERPLBLD CKD-EPI 2021: 101 ML/MIN/1.73
EOSINOPHIL # BLD AUTO: 0.11 10*3/MM3 (ref 0–0.4)
EOSINOPHIL NFR BLD AUTO: 1.8 % (ref 0.3–6.2)
ERYTHROCYTE [DISTWIDTH] IN BLOOD BY AUTOMATED COUNT: 13.3 % (ref 12.3–15.4)
GEN 5 2HR TROPONIN T REFLEX: <6 NG/L
GLOBULIN UR ELPH-MCNC: 2.9 GM/DL
GLUCOSE BLDC GLUCOMTR-MCNC: 72 MG/DL (ref 70–130)
GLUCOSE SERPL-MCNC: 79 MG/DL (ref 65–99)
HCT VFR BLD AUTO: 37.3 % (ref 34–46.6)
HGB BLD-MCNC: 12 G/DL (ref 12–15.9)
IMM GRANULOCYTES # BLD AUTO: 0.02 10*3/MM3 (ref 0–0.05)
IMM GRANULOCYTES NFR BLD AUTO: 0.3 % (ref 0–0.5)
INR PPP: 1.14 (ref 0.89–1.12)
LYMPHOCYTES # BLD AUTO: 2.64 10*3/MM3 (ref 0.7–3.1)
LYMPHOCYTES NFR BLD AUTO: 43 % (ref 19.6–45.3)
MAGNESIUM SERPL-MCNC: 2.1 MG/DL (ref 1.6–2.6)
MCH RBC QN AUTO: 25.5 PG (ref 26.6–33)
MCHC RBC AUTO-ENTMCNC: 32.2 G/DL (ref 31.5–35.7)
MCV RBC AUTO: 79.4 FL (ref 79–97)
MONOCYTES # BLD AUTO: 0.55 10*3/MM3 (ref 0.1–0.9)
MONOCYTES NFR BLD AUTO: 9 % (ref 5–12)
NEUTROPHILS NFR BLD AUTO: 2.79 10*3/MM3 (ref 1.7–7)
NEUTROPHILS NFR BLD AUTO: 45.4 % (ref 42.7–76)
NRBC BLD AUTO-RTO: 0 /100 WBC (ref 0–0.2)
NT-PROBNP SERPL-MCNC: <36 PG/ML (ref 0–900)
PLATELET # BLD AUTO: 139 10*3/MM3 (ref 140–450)
PMV BLD AUTO: 8.6 FL (ref 6–12)
POTASSIUM SERPL-SCNC: 3.9 MMOL/L (ref 3.5–5.2)
PROT SERPL-MCNC: 7.1 G/DL (ref 6–8.5)
PROTHROMBIN TIME: 14.7 SECONDS (ref 12.2–14.5)
RBC # BLD AUTO: 4.7 10*6/MM3 (ref 3.77–5.28)
SODIUM SERPL-SCNC: 138 MMOL/L (ref 136–145)
TROPONIN T DELTA: NORMAL
TROPONIN T SERPL HS-MCNC: <6 NG/L
UFH PPP CHRO-ACNC: 0.1 IU/ML (ref 0.3–0.7)
WBC NRBC COR # BLD AUTO: 6.14 10*3/MM3 (ref 3.4–10.8)

## 2024-08-08 PROCEDURE — 93005 ELECTROCARDIOGRAM TRACING: CPT | Performed by: INTERNAL MEDICINE

## 2024-08-08 PROCEDURE — G0379 DIRECT REFER HOSPITAL OBSERV: HCPCS

## 2024-08-08 PROCEDURE — 96365 THER/PROPH/DIAG IV INF INIT: CPT

## 2024-08-08 PROCEDURE — 25010000002 MORPHINE PER 10 MG: Performed by: NURSE PRACTITIONER

## 2024-08-08 PROCEDURE — 83735 ASSAY OF MAGNESIUM: CPT | Performed by: NURSE PRACTITIONER

## 2024-08-08 PROCEDURE — 96366 THER/PROPH/DIAG IV INF ADDON: CPT

## 2024-08-08 PROCEDURE — 85520 HEPARIN ASSAY: CPT | Performed by: NURSE PRACTITIONER

## 2024-08-08 PROCEDURE — 84484 ASSAY OF TROPONIN QUANT: CPT | Performed by: NURSE PRACTITIONER

## 2024-08-08 PROCEDURE — 85025 COMPLETE CBC W/AUTO DIFF WBC: CPT | Performed by: NURSE PRACTITIONER

## 2024-08-08 PROCEDURE — 25010000002 HEPARIN (PORCINE) 25000-0.45 UT/250ML-% SOLUTION: Performed by: NURSE PRACTITIONER

## 2024-08-08 PROCEDURE — 99223 1ST HOSP IP/OBS HIGH 75: CPT | Performed by: NURSE PRACTITIONER

## 2024-08-08 PROCEDURE — 85379 FIBRIN DEGRADATION QUANT: CPT | Performed by: NURSE PRACTITIONER

## 2024-08-08 PROCEDURE — 82948 REAGENT STRIP/BLOOD GLUCOSE: CPT

## 2024-08-08 PROCEDURE — 80053 COMPREHEN METABOLIC PANEL: CPT | Performed by: NURSE PRACTITIONER

## 2024-08-08 PROCEDURE — G0378 HOSPITAL OBSERVATION PER HR: HCPCS

## 2024-08-08 PROCEDURE — 93010 ELECTROCARDIOGRAM REPORT: CPT | Performed by: INTERNAL MEDICINE

## 2024-08-08 PROCEDURE — 85730 THROMBOPLASTIN TIME PARTIAL: CPT | Performed by: NURSE PRACTITIONER

## 2024-08-08 PROCEDURE — 96375 TX/PRO/DX INJ NEW DRUG ADDON: CPT

## 2024-08-08 PROCEDURE — 83880 ASSAY OF NATRIURETIC PEPTIDE: CPT | Performed by: NURSE PRACTITIONER

## 2024-08-08 PROCEDURE — 85610 PROTHROMBIN TIME: CPT | Performed by: NURSE PRACTITIONER

## 2024-08-08 RX ORDER — FLUTICASONE PROPIONATE 50 MCG
2 SPRAY, SUSPENSION (ML) NASAL DAILY
Status: DISCONTINUED | OUTPATIENT
Start: 2024-08-09 | End: 2024-08-11 | Stop reason: HOSPADM

## 2024-08-08 RX ORDER — BISACODYL 10 MG
10 SUPPOSITORY, RECTAL RECTAL DAILY PRN
Status: DISCONTINUED | OUTPATIENT
Start: 2024-08-08 | End: 2024-08-11 | Stop reason: HOSPADM

## 2024-08-08 RX ORDER — LISINOPRIL 5 MG/1
5 TABLET ORAL DAILY
Status: DISCONTINUED | OUTPATIENT
Start: 2024-08-09 | End: 2024-08-10

## 2024-08-08 RX ORDER — ROPINIROLE 2 MG/1
2 TABLET, FILM COATED ORAL NIGHTLY
Status: DISCONTINUED | OUTPATIENT
Start: 2024-08-08 | End: 2024-08-08

## 2024-08-08 RX ORDER — ASPIRIN 81 MG/1
81 TABLET ORAL DAILY
Status: DISCONTINUED | OUTPATIENT
Start: 2024-08-09 | End: 2024-08-11 | Stop reason: HOSPADM

## 2024-08-08 RX ORDER — SODIUM CHLORIDE 9 MG/ML
40 INJECTION, SOLUTION INTRAVENOUS AS NEEDED
Status: DISCONTINUED | OUTPATIENT
Start: 2024-08-08 | End: 2024-08-11 | Stop reason: HOSPADM

## 2024-08-08 RX ORDER — CETIRIZINE HYDROCHLORIDE 10 MG/1
10 TABLET ORAL DAILY
Status: DISCONTINUED | OUTPATIENT
Start: 2024-08-09 | End: 2024-08-11 | Stop reason: HOSPADM

## 2024-08-08 RX ORDER — HEPARIN SODIUM 1000 [USP'U]/ML
50 INJECTION, SOLUTION INTRAVENOUS; SUBCUTANEOUS AS NEEDED
Status: DISCONTINUED | OUTPATIENT
Start: 2024-08-08 | End: 2024-08-08

## 2024-08-08 RX ORDER — HEPARIN SODIUM 10000 [USP'U]/100ML
14 INJECTION, SOLUTION INTRAVENOUS
Status: DISCONTINUED | OUTPATIENT
Start: 2024-08-08 | End: 2024-08-09

## 2024-08-08 RX ORDER — POLYETHYLENE GLYCOL 3350 17 G/17G
17 POWDER, FOR SOLUTION ORAL DAILY PRN
Status: DISCONTINUED | OUTPATIENT
Start: 2024-08-08 | End: 2024-08-11 | Stop reason: HOSPADM

## 2024-08-08 RX ORDER — MIRTAZAPINE 15 MG/1
15 TABLET, FILM COATED ORAL NIGHTLY
Status: DISCONTINUED | OUTPATIENT
Start: 2024-08-08 | End: 2024-08-08

## 2024-08-08 RX ORDER — CARVEDILOL 12.5 MG/1
12.5 TABLET ORAL EVERY 12 HOURS SCHEDULED
Status: DISCONTINUED | OUTPATIENT
Start: 2024-08-08 | End: 2024-08-11 | Stop reason: HOSPADM

## 2024-08-08 RX ORDER — ALPRAZOLAM 2 MG/1
2 TABLET ORAL NIGHTLY PRN
COMMUNITY

## 2024-08-08 RX ORDER — IBUPROFEN 600 MG/1
1 TABLET ORAL
Status: DISCONTINUED | OUTPATIENT
Start: 2024-08-08 | End: 2024-08-10

## 2024-08-08 RX ORDER — ZOLPIDEM TARTRATE 5 MG/1
5 TABLET ORAL NIGHTLY PRN
Status: DISCONTINUED | OUTPATIENT
Start: 2024-08-08 | End: 2024-08-11 | Stop reason: HOSPADM

## 2024-08-08 RX ORDER — NICOTINE POLACRILEX 4 MG
15 LOZENGE BUCCAL
Status: DISCONTINUED | OUTPATIENT
Start: 2024-08-08 | End: 2024-08-10

## 2024-08-08 RX ORDER — HYDROCODONE BITARTRATE AND ACETAMINOPHEN 5; 325 MG/1; MG/1
1 TABLET ORAL EVERY 4 HOURS PRN
Status: COMPLETED | OUTPATIENT
Start: 2024-08-08 | End: 2024-08-09

## 2024-08-08 RX ORDER — HEPARIN SODIUM 1000 [USP'U]/ML
25 INJECTION, SOLUTION INTRAVENOUS; SUBCUTANEOUS AS NEEDED
Status: DISCONTINUED | OUTPATIENT
Start: 2024-08-08 | End: 2024-08-08

## 2024-08-08 RX ORDER — DEXTROSE MONOHYDRATE 25 G/50ML
25 INJECTION, SOLUTION INTRAVENOUS
Status: DISCONTINUED | OUTPATIENT
Start: 2024-08-08 | End: 2024-08-10

## 2024-08-08 RX ORDER — MORPHINE SULFATE 2 MG/ML
1 INJECTION, SOLUTION INTRAMUSCULAR; INTRAVENOUS ONCE AS NEEDED
Status: COMPLETED | OUTPATIENT
Start: 2024-08-08 | End: 2024-08-08

## 2024-08-08 RX ORDER — MONTELUKAST SODIUM 10 MG/1
10 TABLET ORAL NIGHTLY PRN
Status: DISCONTINUED | OUTPATIENT
Start: 2024-08-08 | End: 2024-08-11 | Stop reason: HOSPADM

## 2024-08-08 RX ORDER — SODIUM CHLORIDE 0.9 % (FLUSH) 0.9 %
10 SYRINGE (ML) INJECTION AS NEEDED
Status: DISCONTINUED | OUTPATIENT
Start: 2024-08-08 | End: 2024-08-11 | Stop reason: HOSPADM

## 2024-08-08 RX ORDER — FLUOXETINE HYDROCHLORIDE 20 MG/1
40 CAPSULE ORAL 2 TIMES DAILY
Status: DISCONTINUED | OUTPATIENT
Start: 2024-08-08 | End: 2024-08-08

## 2024-08-08 RX ORDER — AMOXICILLIN 250 MG
2 CAPSULE ORAL 2 TIMES DAILY PRN
Status: DISCONTINUED | OUTPATIENT
Start: 2024-08-08 | End: 2024-08-11 | Stop reason: HOSPADM

## 2024-08-08 RX ORDER — RANOLAZINE 500 MG/1
500 TABLET, EXTENDED RELEASE ORAL 2 TIMES DAILY
Status: DISCONTINUED | OUTPATIENT
Start: 2024-08-08 | End: 2024-08-10

## 2024-08-08 RX ORDER — ATORVASTATIN CALCIUM 40 MG/1
40 TABLET, FILM COATED ORAL DAILY
Status: DISCONTINUED | OUTPATIENT
Start: 2024-08-09 | End: 2024-08-11 | Stop reason: HOSPADM

## 2024-08-08 RX ORDER — NITROGLYCERIN 0.4 MG/1
0.4 TABLET SUBLINGUAL
Status: DISCONTINUED | OUTPATIENT
Start: 2024-08-08 | End: 2024-08-11 | Stop reason: HOSPADM

## 2024-08-08 RX ORDER — ALPRAZOLAM 1 MG/1
2 TABLET ORAL NIGHTLY PRN
Status: DISCONTINUED | OUTPATIENT
Start: 2024-08-08 | End: 2024-08-10

## 2024-08-08 RX ORDER — BISACODYL 5 MG/1
5 TABLET, DELAYED RELEASE ORAL DAILY PRN
Status: DISCONTINUED | OUTPATIENT
Start: 2024-08-08 | End: 2024-08-11 | Stop reason: HOSPADM

## 2024-08-08 RX ORDER — CARVEDILOL 12.5 MG/1
12.5 TABLET ORAL DAILY
Status: DISCONTINUED | OUTPATIENT
Start: 2024-08-09 | End: 2024-08-08

## 2024-08-08 RX ORDER — SODIUM CHLORIDE 0.9 % (FLUSH) 0.9 %
10 SYRINGE (ML) INJECTION EVERY 12 HOURS SCHEDULED
Status: DISCONTINUED | OUTPATIENT
Start: 2024-08-08 | End: 2024-08-11 | Stop reason: HOSPADM

## 2024-08-08 RX ORDER — PANTOPRAZOLE SODIUM 40 MG/1
40 TABLET, DELAYED RELEASE ORAL
Status: DISCONTINUED | OUTPATIENT
Start: 2024-08-09 | End: 2024-08-11 | Stop reason: HOSPADM

## 2024-08-08 RX ORDER — INSULIN LISPRO 100 [IU]/ML
2-7 INJECTION, SOLUTION INTRAVENOUS; SUBCUTANEOUS
Status: DISCONTINUED | OUTPATIENT
Start: 2024-08-08 | End: 2024-08-10

## 2024-08-08 RX ADMIN — ALPRAZOLAM 2 MG: 1 TABLET ORAL at 22:13

## 2024-08-08 RX ADMIN — ZOLPIDEM TARTRATE 5 MG: 5 TABLET ORAL at 22:13

## 2024-08-08 RX ADMIN — Medication 10 ML: at 21:13

## 2024-08-08 RX ADMIN — ROPINIROLE HYDROCHLORIDE 2 MG: 2 TABLET, FILM COATED ORAL at 22:13

## 2024-08-08 RX ADMIN — HEPARIN SODIUM 12 UNITS/KG/HR: 10000 INJECTION, SOLUTION INTRAVENOUS at 21:13

## 2024-08-08 RX ADMIN — MORPHINE SULFATE 1 MG: 2 INJECTION, SOLUTION INTRAMUSCULAR; INTRAVENOUS at 22:13

## 2024-08-08 NOTE — Clinical Note
Hemostasis started on the left radial artery. R-Band was used in achieving hemostasis. Radial compression device applied to vessel. Hemostasis achieved successfully. Closure device additional comment: 11 cc of air in the band

## 2024-08-08 NOTE — Clinical Note
The right DP pulse is +2. The right PT pulse is +2. The left radial pulse is +2. The right radial pulse is +2.

## 2024-08-09 PROBLEM — I20.0 UNSTABLE ANGINA: Status: ACTIVE | Noted: 2024-08-08

## 2024-08-09 LAB
ALBUMIN SERPL-MCNC: 3.9 G/DL (ref 3.5–5.2)
ALBUMIN/GLOB SERPL: 1.3 G/DL
ALP SERPL-CCNC: 94 U/L (ref 39–117)
ALT SERPL W P-5'-P-CCNC: 13 U/L (ref 1–33)
ANION GAP SERPL CALCULATED.3IONS-SCNC: 7 MMOL/L (ref 5–15)
AST SERPL-CCNC: 18 U/L (ref 1–32)
BASOPHILS # BLD AUTO: 0.03 10*3/MM3 (ref 0–0.2)
BASOPHILS NFR BLD AUTO: 0.6 % (ref 0–1.5)
BILIRUB SERPL-MCNC: 0.4 MG/DL (ref 0–1.2)
BUN SERPL-MCNC: 18 MG/DL (ref 6–20)
BUN/CREAT SERPL: 21.7 (ref 7–25)
CALCIUM SPEC-SCNC: 9 MG/DL (ref 8.6–10.5)
CATH EF ESTIMATED: 70 %
CHLORIDE SERPL-SCNC: 107 MMOL/L (ref 98–107)
CHOLEST SERPL-MCNC: 141 MG/DL (ref 0–200)
CO2 SERPL-SCNC: 29 MMOL/L (ref 22–29)
CREAT SERPL-MCNC: 0.83 MG/DL (ref 0.57–1)
DEPRECATED RDW RBC AUTO: 37.3 FL (ref 37–54)
EGFRCR SERPLBLD CKD-EPI 2021: 82.3 ML/MIN/1.73
EOSINOPHIL # BLD AUTO: 0.1 10*3/MM3 (ref 0–0.4)
EOSINOPHIL NFR BLD AUTO: 1.9 % (ref 0.3–6.2)
ERYTHROCYTE [DISTWIDTH] IN BLOOD BY AUTOMATED COUNT: 13.2 % (ref 12.3–15.4)
GLOBULIN UR ELPH-MCNC: 3 GM/DL
GLUCOSE BLDC GLUCOMTR-MCNC: 199 MG/DL (ref 70–130)
GLUCOSE BLDC GLUCOMTR-MCNC: 74 MG/DL (ref 70–130)
GLUCOSE BLDC GLUCOMTR-MCNC: 83 MG/DL (ref 70–130)
GLUCOSE BLDC GLUCOMTR-MCNC: 87 MG/DL (ref 70–130)
GLUCOSE BLDC GLUCOMTR-MCNC: 91 MG/DL (ref 70–130)
GLUCOSE SERPL-MCNC: 90 MG/DL (ref 65–99)
HBA1C MFR BLD: 5 % (ref 4.8–5.6)
HCT VFR BLD AUTO: 37.7 % (ref 34–46.6)
HDLC SERPL-MCNC: 40 MG/DL (ref 40–60)
HGB BLD-MCNC: 12 G/DL (ref 12–15.9)
IMM GRANULOCYTES # BLD AUTO: 0.02 10*3/MM3 (ref 0–0.05)
IMM GRANULOCYTES NFR BLD AUTO: 0.4 % (ref 0–0.5)
LDLC SERPL CALC-MCNC: 72 MG/DL (ref 0–100)
LDLC/HDLC SERPL: 1.68 {RATIO}
LYMPHOCYTES # BLD AUTO: 2.03 10*3/MM3 (ref 0.7–3.1)
LYMPHOCYTES NFR BLD AUTO: 38.7 % (ref 19.6–45.3)
MCH RBC QN AUTO: 25.4 PG (ref 26.6–33)
MCHC RBC AUTO-ENTMCNC: 31.8 G/DL (ref 31.5–35.7)
MCV RBC AUTO: 79.7 FL (ref 79–97)
MONOCYTES # BLD AUTO: 0.51 10*3/MM3 (ref 0.1–0.9)
MONOCYTES NFR BLD AUTO: 9.7 % (ref 5–12)
NEUTROPHILS NFR BLD AUTO: 2.55 10*3/MM3 (ref 1.7–7)
NEUTROPHILS NFR BLD AUTO: 48.7 % (ref 42.7–76)
NRBC BLD AUTO-RTO: 0 /100 WBC (ref 0–0.2)
PLATELET # BLD AUTO: 138 10*3/MM3 (ref 140–450)
PMV BLD AUTO: 8.6 FL (ref 6–12)
POTASSIUM SERPL-SCNC: 3.8 MMOL/L (ref 3.5–5.2)
PROT SERPL-MCNC: 6.9 G/DL (ref 6–8.5)
QT INTERVAL: 402 MS
QTC INTERVAL: 452 MS
RBC # BLD AUTO: 4.73 10*6/MM3 (ref 3.77–5.28)
SODIUM SERPL-SCNC: 143 MMOL/L (ref 136–145)
TRIGL SERPL-MCNC: 169 MG/DL (ref 0–150)
UFH PPP CHRO-ACNC: 0.14 IU/ML (ref 0.3–0.7)
UFH PPP CHRO-ACNC: 0.3 IU/ML (ref 0.3–0.7)
VLDLC SERPL-MCNC: 29 MG/DL (ref 5–40)
WBC NRBC COR # BLD AUTO: 5.24 10*3/MM3 (ref 3.4–10.8)

## 2024-08-09 PROCEDURE — 85520 HEPARIN ASSAY: CPT

## 2024-08-09 PROCEDURE — 25010000002 FENTANYL CITRATE (PF) 50 MCG/ML SOLUTION: Performed by: INTERNAL MEDICINE

## 2024-08-09 PROCEDURE — 93459 L HRT ART/GRFT ANGIO: CPT | Performed by: INTERNAL MEDICINE

## 2024-08-09 PROCEDURE — 80053 COMPREHEN METABOLIC PANEL: CPT | Performed by: NURSE PRACTITIONER

## 2024-08-09 PROCEDURE — G0378 HOSPITAL OBSERVATION PER HR: HCPCS

## 2024-08-09 PROCEDURE — 99213 OFFICE O/P EST LOW 20 MIN: CPT | Performed by: NURSE PRACTITIONER

## 2024-08-09 PROCEDURE — 96366 THER/PROPH/DIAG IV INF ADDON: CPT

## 2024-08-09 PROCEDURE — 85025 COMPLETE CBC W/AUTO DIFF WBC: CPT | Performed by: NURSE PRACTITIONER

## 2024-08-09 PROCEDURE — 83036 HEMOGLOBIN GLYCOSYLATED A1C: CPT | Performed by: NURSE PRACTITIONER

## 2024-08-09 PROCEDURE — 25010000002 MIDAZOLAM PER 1 MG: Performed by: INTERNAL MEDICINE

## 2024-08-09 PROCEDURE — 82948 REAGENT STRIP/BLOOD GLUCOSE: CPT

## 2024-08-09 PROCEDURE — 25510000001 IOPAMIDOL PER 1 ML: Performed by: INTERNAL MEDICINE

## 2024-08-09 PROCEDURE — C1894 INTRO/SHEATH, NON-LASER: HCPCS | Performed by: INTERNAL MEDICINE

## 2024-08-09 PROCEDURE — 96376 TX/PRO/DX INJ SAME DRUG ADON: CPT

## 2024-08-09 PROCEDURE — 25810000003 SODIUM CHLORIDE 0.9 % SOLUTION: Performed by: INTERNAL MEDICINE

## 2024-08-09 PROCEDURE — 99232 SBSQ HOSP IP/OBS MODERATE 35: CPT | Performed by: STUDENT IN AN ORGANIZED HEALTH CARE EDUCATION/TRAINING PROGRAM

## 2024-08-09 PROCEDURE — C1769 GUIDE WIRE: HCPCS | Performed by: INTERNAL MEDICINE

## 2024-08-09 PROCEDURE — 25010000002 HEPARIN (PORCINE) PER 1000 UNITS

## 2024-08-09 PROCEDURE — 25010000002 MORPHINE PER 10 MG: Performed by: INTERNAL MEDICINE

## 2024-08-09 PROCEDURE — 25010000002 PHENYLEPHRINE 10 MG/ML SOLUTION: Performed by: INTERNAL MEDICINE

## 2024-08-09 PROCEDURE — 25010000002 NICARDIPINE 2.5 MG/ML SOLUTION: Performed by: INTERNAL MEDICINE

## 2024-08-09 PROCEDURE — 25010000002 ONDANSETRON PER 1 MG: Performed by: NURSE PRACTITIONER

## 2024-08-09 PROCEDURE — 80061 LIPID PANEL: CPT | Performed by: NURSE PRACTITIONER

## 2024-08-09 PROCEDURE — 25010000002 HEPARIN (PORCINE) PER 1000 UNITS: Performed by: INTERNAL MEDICINE

## 2024-08-09 RX ORDER — NITROGLYCERIN 0.4 MG/1
0.4 TABLET SUBLINGUAL
Status: DISCONTINUED | OUTPATIENT
Start: 2024-08-09 | End: 2024-08-09 | Stop reason: SDUPTHER

## 2024-08-09 RX ORDER — DIPHENHYDRAMINE HYDROCHLORIDE 50 MG/ML
25 INJECTION INTRAMUSCULAR; INTRAVENOUS EVERY 6 HOURS PRN
Status: DISCONTINUED | OUTPATIENT
Start: 2024-08-09 | End: 2024-08-11 | Stop reason: HOSPADM

## 2024-08-09 RX ORDER — PHENYLEPHRINE HYDROCHLORIDE 10 MG/ML
INJECTION INTRAVENOUS
Status: DISCONTINUED | OUTPATIENT
Start: 2024-08-09 | End: 2024-08-09 | Stop reason: HOSPADM

## 2024-08-09 RX ORDER — ACETAMINOPHEN 325 MG/1
650 TABLET ORAL EVERY 4 HOURS PRN
Status: DISCONTINUED | OUTPATIENT
Start: 2024-08-09 | End: 2024-08-11 | Stop reason: HOSPADM

## 2024-08-09 RX ORDER — FENTANYL CITRATE 50 UG/ML
INJECTION, SOLUTION INTRAMUSCULAR; INTRAVENOUS
Status: DISCONTINUED | OUTPATIENT
Start: 2024-08-09 | End: 2024-08-09 | Stop reason: HOSPADM

## 2024-08-09 RX ORDER — NICARDIPINE HCL-0.9% SOD CHLOR 1 MG/10 ML
SYRINGE (ML) INTRAVENOUS
Status: DISCONTINUED | OUTPATIENT
Start: 2024-08-09 | End: 2024-08-09 | Stop reason: HOSPADM

## 2024-08-09 RX ORDER — HEPARIN SODIUM 1000 [USP'U]/ML
2000 INJECTION, SOLUTION INTRAVENOUS; SUBCUTANEOUS ONCE
Status: COMPLETED | OUTPATIENT
Start: 2024-08-09 | End: 2024-08-09

## 2024-08-09 RX ORDER — HEPARIN SODIUM 1000 [USP'U]/ML
INJECTION, SOLUTION INTRAVENOUS; SUBCUTANEOUS
Status: DISCONTINUED | OUTPATIENT
Start: 2024-08-09 | End: 2024-08-09 | Stop reason: HOSPADM

## 2024-08-09 RX ORDER — ONDANSETRON 2 MG/ML
4 INJECTION INTRAMUSCULAR; INTRAVENOUS EVERY 6 HOURS PRN
Status: DISCONTINUED | OUTPATIENT
Start: 2024-08-09 | End: 2024-08-11 | Stop reason: HOSPADM

## 2024-08-09 RX ORDER — MIDAZOLAM HYDROCHLORIDE 1 MG/ML
INJECTION INTRAMUSCULAR; INTRAVENOUS
Status: DISCONTINUED | OUTPATIENT
Start: 2024-08-09 | End: 2024-08-09 | Stop reason: HOSPADM

## 2024-08-09 RX ORDER — HYDROCODONE BITARTRATE AND ACETAMINOPHEN 5; 325 MG/1; MG/1
1 TABLET ORAL EVERY 4 HOURS PRN
Status: COMPLETED | OUTPATIENT
Start: 2024-08-09 | End: 2024-08-09

## 2024-08-09 RX ORDER — ALPRAZOLAM 0.25 MG/1
0.25 TABLET ORAL 3 TIMES DAILY PRN
Status: DISCONTINUED | OUTPATIENT
Start: 2024-08-09 | End: 2024-08-09 | Stop reason: SDUPTHER

## 2024-08-09 RX ORDER — LIDOCAINE HYDROCHLORIDE 10 MG/ML
INJECTION, SOLUTION EPIDURAL; INFILTRATION; INTRACAUDAL; PERINEURAL
Status: DISCONTINUED | OUTPATIENT
Start: 2024-08-09 | End: 2024-08-09 | Stop reason: HOSPADM

## 2024-08-09 RX ORDER — DIAZEPAM 2 MG/1
2 TABLET ORAL EVERY 12 HOURS PRN
Status: DISCONTINUED | OUTPATIENT
Start: 2024-08-09 | End: 2024-08-10

## 2024-08-09 RX ORDER — SODIUM CHLORIDE 9 MG/ML
100 INJECTION, SOLUTION INTRAVENOUS CONTINUOUS
Status: DISCONTINUED | OUTPATIENT
Start: 2024-08-10 | End: 2024-08-11 | Stop reason: HOSPADM

## 2024-08-09 RX ORDER — MORPHINE SULFATE 2 MG/ML
INJECTION, SOLUTION INTRAMUSCULAR; INTRAVENOUS
Status: DISCONTINUED | OUTPATIENT
Start: 2024-08-09 | End: 2024-08-09 | Stop reason: HOSPADM

## 2024-08-09 RX ADMIN — TICAGRELOR 90 MG: 90 TABLET ORAL at 00:08

## 2024-08-09 RX ADMIN — ZOLPIDEM TARTRATE 5 MG: 5 TABLET ORAL at 23:10

## 2024-08-09 RX ADMIN — TICAGRELOR 90 MG: 90 TABLET ORAL at 22:02

## 2024-08-09 RX ADMIN — HYDROCODONE BITARTRATE AND ACETAMINOPHEN 1 TABLET: 5; 325 TABLET ORAL at 17:23

## 2024-08-09 RX ADMIN — LISINOPRIL 5 MG: 5 TABLET ORAL at 09:13

## 2024-08-09 RX ADMIN — ISOSORBIDE MONONITRATE 90 MG: 60 TABLET, EXTENDED RELEASE ORAL at 09:13

## 2024-08-09 RX ADMIN — FLUTICASONE PROPIONATE 2 SPRAY: 50 SPRAY, METERED NASAL at 09:13

## 2024-08-09 RX ADMIN — HEPARIN SODIUM 2000 UNITS: 1000 INJECTION INTRAVENOUS; SUBCUTANEOUS at 05:44

## 2024-08-09 RX ADMIN — RANOLAZINE 500 MG: 500 TABLET, FILM COATED, EXTENDED RELEASE ORAL at 00:08

## 2024-08-09 RX ADMIN — CETIRIZINE HYDROCHLORIDE 10 MG: 10 TABLET, FILM COATED ORAL at 09:12

## 2024-08-09 RX ADMIN — RANOLAZINE 500 MG: 500 TABLET, FILM COATED, EXTENDED RELEASE ORAL at 22:01

## 2024-08-09 RX ADMIN — HYDROCODONE BITARTRATE AND ACETAMINOPHEN 1 TABLET: 5; 325 TABLET ORAL at 23:00

## 2024-08-09 RX ADMIN — TICAGRELOR 90 MG: 90 TABLET ORAL at 09:12

## 2024-08-09 RX ADMIN — ONDANSETRON 4 MG: 2 INJECTION INTRAMUSCULAR; INTRAVENOUS at 20:10

## 2024-08-09 RX ADMIN — DIAZEPAM 2 MG: 2 TABLET ORAL at 15:21

## 2024-08-09 RX ADMIN — RANOLAZINE 500 MG: 500 TABLET, FILM COATED, EXTENDED RELEASE ORAL at 09:13

## 2024-08-09 RX ADMIN — ATORVASTATIN CALCIUM 40 MG: 40 TABLET, FILM COATED ORAL at 09:12

## 2024-08-09 RX ADMIN — CARVEDILOL 12.5 MG: 12.5 TABLET, FILM COATED ORAL at 09:12

## 2024-08-09 RX ADMIN — Medication 10 ML: at 22:08

## 2024-08-09 RX ADMIN — CARVEDILOL 12.5 MG: 12.5 TABLET, FILM COATED ORAL at 00:09

## 2024-08-09 RX ADMIN — ASPIRIN 81 MG: 81 TABLET, COATED ORAL at 09:11

## 2024-08-09 NOTE — PROGRESS NOTES
HEPARIN INFUSION  Alee Wu is a  58 y.o. female receiving heparin infusion.     Therapy for (VTE/Cardiac):   cardiac  Patient Weight: 77 kg  Initial Bolus (Y/N):   n  Any Bolus (Y/N):   y        Signs or Symptoms of Bleeding: no    Cardiac or Other (Not VTE)   Initial Bolus: 60 units/kg (Max 4,000 units)  Initial rate: 12 units/kg/hr (Max 1,000 units/hr)   Anti Xa Rebolus Infusion Hold time Change infusion Dose (Units/kg/hr) Next Anti Xa or aPTT Level Due   < 0.11 50 Units/kg  (4000 Units Max) None Increase by  3 Units/kg/hr 6 hours   0.11- 0.19 25 Units/kg  (2000 Units Max) None Increase by  2 Units/kg/hr 6 hours   0.2 - 0.29 0 None Increase by  1 Units/kg/hr 6 hours   0.3 - 0.5 0 None No Change 6 hours (after 2 consecutive levels in range check qAM)   0.51 - 0.6 0 None Decrease by  1 Units/kg/hr 6 hours   0.61 - 0.8 0 30 Minutes Decrease by  2 Units/kg/hr 6 hours   0.81 - 1 0 60 Minutes Decrease by  3 Units/kg/hr 6 hours   >1 0 Hold  After Anti Xa less than 0.5 decrease previous rate by  4 Units/kg/hr  Every 2 hours until Anti Xa  less than 0.5 then when infusion restarts in 6 hours     Recommend Xa every 6 hours.   Results from last 7 days   Lab Units 08/09/24  0350 08/08/24 2038 08/08/24 2018   INR   --  1.14*  --    HEMOGLOBIN g/dL 12.0  --  12.0   HEMATOCRIT % 37.7  --  37.3   PLATELETS 10*3/mm3 138*  --  139*          Date   Time   Anti-Xa Current Rate (Unit/kg/hr) Bolus   (Units) Rate Change   (Unit/kg/hr) New Rate (Unit/kg/hr) Next   Anti-Xa Comments  Pump Check Daily   8/8 2111 0.10  0 0 +12 12 0300 Heparin drip started at [facility]SH.  Heparin was off for ~1½ hours on transfer .  Restart heparin and re-check anti-Xa in 6 hours  DW RN   8/9 0350 0.14 12 2000 +2 14 1200 Nadja 3250 -acb    8/9 1154 0.30 14 -- -- 14 1800  RN                                                                                                                                                                                                          Chace Buenrostro, PharmD  8/9/2024  14:17 EDT

## 2024-08-09 NOTE — CONSULTS
[facility]   Consult Note    Referring Provider: Luba JACOME    Reason for Consultation: chest pain and CAD    Patient Care Team:  Anna Pulido APRN as PCP - General (Nurse Practitioner)    Problem List:  Coronary artery disease  CABG 2010 x 4, incomplete database.  2023 NSTEMI SIMONA to VG to RCA.  LIMA to LAD patent.  Apparent occlusion of 2 VG's and stenosis of VG to RCA.  Hypertension  Dyslipidemia  Diabetes   Neuropathy   Sleep apnea  RLS  Liver cirrhosis and NARANJO  GERD  Anxiety/depression  COPD  Surgeries:  CABG  Cholecystectomy  Knee surgery  Gastric sleeve  Endometrial ablation   section       No Known Allergies        Current Facility-Administered Medications:     ALPRAZolam (XANAX) tablet 2 mg, 2 mg, Oral, Nightly PRN, Tay, Luba, APRN, 2 mg at 24    aspirin EC tablet 81 mg, 81 mg, Oral, Daily, Tay, Luba, APRN, 81 mg at 24    atorvastatin (LIPITOR) tablet 40 mg, 40 mg, Oral, Daily, Tay, Luba, APRN, 40 mg at 24    sennosides-docusate (PERICOLACE) 8.6-50 MG per tablet 2 tablet, 2 tablet, Oral, BID PRN **AND** polyethylene glycol (MIRALAX) packet 17 g, 17 g, Oral, Daily PRN **AND** bisacodyl (DULCOLAX) EC tablet 5 mg, 5 mg, Oral, Daily PRN **AND** bisacodyl (DULCOLAX) suppository 10 mg, 10 mg, Rectal, Daily PRN, Tay, Luba, APRN    carvedilol (COREG) tablet 12.5 mg, 12.5 mg, Oral, Q12H, Gina Mcqueen, APRN, 12.5 mg at 24    cetirizine (zyrTEC) tablet 10 mg, 10 mg, Oral, Daily, Tay, Luba, APRN, 10 mg at 24    dextrose (D50W) (25 g/50 mL) IV injection 25 g, 25 g, Intravenous, Q15 Min PRN, Tay, Luba, APRN    dextrose (GLUTOSE) oral gel 15 g, 15 g, Oral, Q15 Min PRN, Tay, Luba, APRN    fluticasone (FLONASE) 50 MCG/ACT nasal spray 2 spray, 2 spray, Each Nare, Daily, Tay, Luba, APRN, 2 spray at 24 0913    glucagon (GLUCAGEN) injection 1 mg, 1 mg,  Intramuscular, Q15 Min PRN, Tay, Luba, APRN    heparin 85499 units/250 mL (100 units/mL) in 0.45 % NaCl infusion, 14 Units/kg/hr, Intravenous, Titrated, Bakari Lee, PharmD, Last Rate: 10.78 mL/hr at 08/09/24 0917, 14 Units/kg/hr at 08/09/24 0917    HYDROcodone-acetaminophen (NORCO) 5-325 MG per tablet 1 tablet, 1 tablet, Oral, Q4H PRN, Tay, Luba, APRN    Insulin Lispro (humaLOG) injection 2-7 Units, 2-7 Units, Subcutaneous, 4x Daily AC & at Bedtime, Tay, Luba, APRN    isosorbide mononitrate (IMDUR) 24 hr tablet 90 mg, 90 mg, Oral, Daily, Tay, Luba, APRN, 90 mg at 08/09/24 0913    lisinopril (PRINIVIL,ZESTRIL) tablet 5 mg, 5 mg, Oral, Daily, Tay, Luba, APRN, 5 mg at 08/09/24 0913    montelukast (SINGULAIR) tablet 10 mg, 10 mg, Oral, Nightly PRN, Tay, Luba, APRN    nitroglycerin (NITROSTAT) SL tablet 0.4 mg, 0.4 mg, Sublingual, Q5 Min PRN, Tay, Luba, APRN    pantoprazole (PROTONIX) EC tablet 40 mg, 40 mg, Oral, Q AM, Tay, Luba, APRN    Pharmacy to Dose Heparin, , Does not apply, Continuous PRN, Tay, Luba, APRN    ranolazine (RANEXA) 12 hr tablet 500 mg, 500 mg, Oral, BID, Tay, Luba, APRN, 500 mg at 08/09/24 0913    sodium chloride 0.9 % flush 10 mL, 10 mL, Intravenous, Q12H, Tay, Luba, APRN, 10 mL at 08/08/24 2113    sodium chloride 0.9 % flush 10 mL, 10 mL, Intravenous, PRN, Tay, Luba, APRN    sodium chloride 0.9 % infusion 40 mL, 40 mL, Intravenous, PRN, Tay, Luba, APRN    ticagrelor (BRILINTA) tablet 90 mg, 90 mg, Oral, Q12H, Gina Mcqueen, APRN, 90 mg at 08/09/24 0912    zolpidem (AMBIEN) tablet 5 mg, 5 mg, Oral, Nightly PRN, Tay, Luba, APRN, 5 mg at 08/08/24 2213    heparin, 14 Units/kg/hr, Last Rate: 14 Units/kg/hr (08/09/24 0917)  Pharmacy to Dose Heparin,         Medications Prior to Admission   Medication Sig Dispense Refill Last Dose    ALPRAZolam (XANAX) 2 MG tablet Take 1 tablet by mouth At Night As  Needed for Anxiety.   Past Month    aspirin 81 MG EC tablet Take 1 tablet by mouth Daily.   8/8/2024    Brilinta 90 MG tablet tablet Take 1 tablet by mouth Daily.   8/7/2024    carvedilol (COREG) 12.5 MG tablet Take 1 tablet by mouth Daily.   8/7/2024    Desvenlafaxine  MG tablet sustained-release 24 hour    8/7/2024    isosorbide mononitrate (IMDUR) 60 MG 24 hr tablet Take 1.5 tablets by mouth Daily. For chest pain. 135 tablet 3 Past Month    lisinopril (PRINIVIL,ZESTRIL) 5 MG tablet Take 1 tablet by mouth Daily.   8/7/2024    nitroglycerin (NITROSTAT) 0.4 MG SL tablet Place 1 tablet under the tongue Every 5 (Five) Minutes As Needed for Chest Pain. Take no more than 3 doses in 15 minutes.   8/8/2024    omeprazole (priLOSEC) 20 MG capsule    8/7/2024    ranolazine (RANEXA) 500 MG 12 hr tablet TAKE 1 TABLET BY MOUTH TWICE  DAILY 180 tablet 3 8/7/2024    rOPINIRole (REQUIP) 2 MG tablet Take 1 tablet by mouth Every Night. Restless leg.   8/7/2024    zolpidem (AMBIEN) 5 MG tablet Take 1 tablet by mouth At Night As Needed for Sleep.   8/7/2024    atorvastatin (LIPITOR) 40 MG tablet Take 40 mg by mouth Daily.       cetirizine (zyrTEC) 10 MG tablet        FLUoxetine (PROzac) 40 MG capsule Take 40 mg by mouth 2 (Two) Times a Day.       fluticasone (FLONASE) 50 MCG/ACT nasal spray        FREESTYLE LITE test strip        ibuprofen (ADVIL,MOTRIN) 800 MG tablet        Lancets (freestyle) lancets        loratadine (CLARITIN) 10 MG tablet Take 1 tablet by mouth Daily As Needed for Allergies.       metFORMIN (GLUCOPHAGE) 1000 MG tablet        metoprolol tartrate (LOPRESSOR) 100 MG tablet Take 100 mg by mouth 2 (Two) Times a Day.       mirtazapine (REMERON) 15 MG tablet Take 15 mg by mouth Every Night.       montelukast (SINGULAIR) 10 MG tablet Take 1 tablet by mouth At Night As Needed (allergies).       omeprazole (priLOSEC) 40 MG capsule Take 1 capsule by mouth 2 (two) times a day. 60 capsule 3          Subjective .    History of present illness:    Patient is a 58-year-old  female who is being seen today for further evaluation of chest pain.  She has previous history of coronary artery disease with previous coronary artery bypass grafting and intervention to her vein graft to the RCA over a year and a half ago.  Patient notes that she was in her usual state of health until yesterday whenever she woke up with significant chest pain.  She took sublingual nitroglycerin that helped to alleviate this.  She notes that this discomfort waxed and waned all day.  She then went and had an EKG and was told that there was some concern for possible changes.  She then presented to her local emergency department for further evaluation.  Cardiac enzymes were negative but her discomfort was similar to her previous angina and responded to nitroglycerin and she was transferred here for higher level of care and consideration of invasive management.  Currently having some discomfort off and on.  But much improved.      Social History     Socioeconomic History    Marital status:    Tobacco Use    Smoking status: Former     Current packs/day: 0.00     Average packs/day: 1.5 packs/day for 36.7 years (55.0 ttl pk-yrs)     Types: Cigarettes     Start date: 1980     Quit date: 2016     Years since quittin.9    Smokeless tobacco: Never   Vaping Use    Vaping status: Never Used   Substance and Sexual Activity    Alcohol use: Never    Drug use: Never    Sexual activity: Yes     Partners: Male     Birth control/protection: Post-menopausal     Family History   Problem Relation Age of Onset    Colon polyps Mother     Obesity Mother     Hypertension Mother     Heart disease Mother     Arthritis Mother     Hypertension Father     Heart disease Father     Heart attack Father     Obesity Father     Obesity Sister     Diabetes Sister     Hypertension Sister     Kidney disease Sister     COPD Maternal Grandmother     Obesity Maternal  "Grandmother     Hypertension Maternal Grandmother     Stroke Maternal Grandmother     Cirrhosis Maternal Grandfather     Hypertension Paternal Grandmother     Obesity Paternal Grandmother     Cancer Paternal Grandfather          Review of Systems:  Review of Systems   Constitutional: Positive for malaise/fatigue. Negative for fever.   HENT:  Negative for nosebleeds.    Eyes:  Negative for redness and visual disturbance.   Cardiovascular:  Positive for chest pain and dyspnea on exertion. Negative for orthopnea, palpitations and paroxysmal nocturnal dyspnea.   Respiratory:  Negative for cough, snoring, sputum production and wheezing.    Hematologic/Lymphatic: Negative for bleeding problem.   Skin:  Negative for flushing, itching and rash.   Musculoskeletal:  Positive for arthritis. Negative for falls, joint pain and muscle cramps.   Gastrointestinal:  Positive for heartburn. Negative for abdominal pain, diarrhea, nausea and vomiting.   Genitourinary:  Negative for hematuria.   Neurological:  Negative for excessive daytime sleepiness, dizziness, headaches, tremors and weakness.   Psychiatric/Behavioral:  Negative for substance abuse. The patient is not nervous/anxious.               Objective   Vitals:  /60 (BP Location: Left arm, Patient Position: Lying)   Pulse 82   Temp 97.8 °F (36.6 °C) (Oral)   Resp 16   Ht 167.6 cm (66\")   Wt 77 kg (169 lb 12.8 oz)   SpO2 97%   BMI 27.41 kg/m²      Intake/Output Summary (Last 24 hours) at 8/9/2024 1007  Last data filed at 8/9/2024 0910  Gross per 24 hour   Intake 115.63 ml   Output --   Net 115.63 ml       Vitals reviewed.   Constitutional:       Appearance: Healthy appearance. Well-developed and not in distress.   Neck:      Vascular: No JVD.      Trachea: No tracheal deviation.   Pulmonary:      Effort: Pulmonary effort is normal.      Breath sounds: Normal breath sounds.   Cardiovascular:      Normal rate. Regular rhythm.      Murmurs: There is no murmur. "   Pulses:     Intact distal pulses.   Edema:     Peripheral edema absent.   Abdominal:      General: Bowel sounds are normal.      Tenderness: There is no abdominal tenderness.   Musculoskeletal:         General: No deformity. Skin:     General: Skin is warm and dry.   Neurological:      Mental Status: Alert and oriented to person, place, and time.              Results Review:  I reviewed the patient's new clinical results.  Results from last 7 days   Lab Units 08/09/24  0350   WBC 10*3/mm3 5.24   HEMOGLOBIN g/dL 12.0   HEMATOCRIT % 37.7   PLATELETS 10*3/mm3 138*     Results from last 7 days   Lab Units 08/09/24  0350   SODIUM mmol/L 143   POTASSIUM mmol/L 3.8   CHLORIDE mmol/L 107   CO2 mmol/L 29.0   BUN mg/dL 18   CREATININE mg/dL 0.83   CALCIUM mg/dL 9.0   BILIRUBIN mg/dL 0.4   ALK PHOS U/L 94   ALT (SGPT) U/L 13   AST (SGOT) U/L 18   GLUCOSE mg/dL 90     Results from last 7 days   Lab Units 08/09/24  0350   SODIUM mmol/L 143   POTASSIUM mmol/L 3.8   CHLORIDE mmol/L 107   CO2 mmol/L 29.0   BUN mg/dL 18   CREATININE mg/dL 0.83   GLUCOSE mg/dL 90   CALCIUM mg/dL 9.0     Results from last 7 days   Lab Units 08/08/24 2038   INR  1.14*     Lab Results   Lab Value Date/Time    TROPONINT <6 08/08/2024 2226    TROPONINT <6 08/08/2024 2018         Results from last 7 days   Lab Units 08/09/24  0350   CHOLESTEROL mg/dL 141   TRIGLYCERIDES mg/dL 169*   HDL CHOL mg/dL 40   LDL CHOL mg/dL 72     Results from last 7 days   Lab Units 08/08/24  2018   PROBNP pg/mL <36.0           Tele: Sinus rhythm    EKG: Sinus rhythm with old IMI.  EKGs from outside facility noted with some ST-T wave changes in inferior leads consistent with possible ischemia.  Not present on EKGs here.      Assessment & Plan     Unstable angina  Coronary artery disease with previous CABG.  Last stent 1/2023 of VG to RCA.  Recent EKG with possible inferior ischemia.  Hypertension  Dyslipidemia, stable.  LDL 72.  Diabetes, on metformin as an  outpatient.      Plan:    Will proceed to cardiac catheterization plus or minus catheter-based intervention today via left radial access.  This was discussed with the patient and family.  They verbalized understanding and wished to proceed.  Further recommendations to follow procedure.        AYAZ Martin   Dictated utilizing Dragon dictation

## 2024-08-09 NOTE — CASE MANAGEMENT/SOCIAL WORK
Discharge Planning Assessment  [facility]     Patient Name: Alee Wu  MRN: [id number]  Today's Date: 8/9/2024    Admit Date: 8/8/2024    Plan: discharge plan   Discharge Needs Assessment       Row Name 08/09/24 1448       Living Environment    People in Home significant other    Name(s) of People in Home Teja Rosas(s/o)    Primary Care Provided by self    Provides Primary Care For no one    Family Caregiver if Needed child(salo), adult;significant other    Family Caregiver Names Teja Rosas(s/o), Fransisca Alejandro and Katelyn Mckinney(daughters)    Quality of Family Relationships involved;supportive    Able to Return to Prior Arrangements yes    Living Arrangement Comments I met with pt at bedside with permission regarding discharge plan. Pt resides in [facility] in a home with s/o Teja.       Transition Planning    Patient/Family Anticipates Transition to home with family    Patient/Family Anticipated Services at Transition     Transportation Anticipated family or friend will provide       Discharge Needs Assessment    Readmission Within the Last 30 Days no previous admission in last 30 days    Equipment Currently Used at Home none    Discharge Coordination/Progress Pt confirms that she has [employer] with prescription coverage. Pt uses [facility] or [facility] in [place]. Pt states she is independent with ADLs and uses no DME or HH. Pt is transfer from [facility] with chest pain. Cards consulted and pt is having a heart cath. Plan is home with family and denies discharge needs. CM will cont to follow                   Discharge Plan       Row Name 08/09/24 7789       Plan    Plan discharge plan    Plan Comments Pt is transfer from [facility] with chest pain. Cards consulted and pt is having a heart cath. Plan is home with family and denies discharge needs. CM will cont to follow    Final Discharge Disposition Code 01 - home or self-care                  Continued Care  and Services - Admitted Since 8/8/2024    No active coordination exists for this encounter.          Demographic Summary       Row Name 08/09/24 1447       General Information    General Information Comments PCP is NATHANAEL MCCALL       Contact Information    Permission Granted to Share Info With     Contact Information Obtained for     Contact Information Comments Teja Rosas(s/o)  [phone] or Fransisca Alejandro(daughter) [phone]                   Functional Status       Row Name 08/09/24 1448       Functional Status    Functional Status Comments Pt is independent with ADLs                   Psychosocial    No documentation.                  Abuse/Neglect    No documentation.                  Legal    No documentation.                  Substance Abuse    No documentation.                  Patient Forms    No documentation.                     Holly Nielson RN

## 2024-08-09 NOTE — H&P
[facility] Medicine Services  HISTORY AND PHYSICAL    Patient Name: Alee Wu  : 1965  MRN: [id number]  Primary Care Physician: Anna Pulido APRN  Date of admission: 2024    Subjective   Subjective     Chief Complaint:  Chest pain     HPI:  Alee Wu is a 58 y.o. female past medical history significant for anxiety, depression, CAD status post CABG x 4 in , hypertension, hyperlipidemia, diabetes mellitus type 2, chronic back pain, COPD, RLS, NARANJO and BELEM presented initially to [facility] with complaints of chest pain that started this morning.  Patient describes 10 out of 10 midsternal chest pain with radiation into her back with associated shortness of breath, nausea and diaphoresis that started this morning.  She did take 1 sublingual nitroglycerin tablet and reported improvement of her symptoms.  She then decided to go to the ED and at that time her symptoms returned.  She states with her prior history of MI her symptoms were somewhat different in which her pain radiated into her arm and in her jaw.  Her last cardiac catheterization was in .  Patient denies any recent fever, chills, vomiting, cough, palpitations, abdominal pain, dysuria or diarrhea.  She does report some dizziness.  Workup there included a troponin that was 5 x 2.  Patient was transferred to [facility] for higher level of care.          Review of Systems   Constitutional:  Positive for activity change and diaphoresis. Negative for appetite change, chills, fatigue, fever and unexpected weight change.   HENT: Negative.     Eyes:  Negative for photophobia and visual disturbance.   Respiratory:  Positive for shortness of breath. Negative for cough.    Cardiovascular:  Positive for chest pain. Negative for palpitations and leg swelling.   Gastrointestinal:  Positive for nausea. Negative for abdominal distention, abdominal pain, blood in stool, constipation,  diarrhea and vomiting.   Genitourinary: Negative.    Musculoskeletal:  Positive for back pain. Negative for neck pain and neck stiffness.   Skin: Negative.    Neurological:  Positive for dizziness. Negative for speech difficulty, weakness, light-headedness, numbness and headaches.   Psychiatric/Behavioral: Negative.                  Personal History     Past Medical History:   Diagnosis Date    Anxiety     Arthritis Not sure    CAD (coronary artery disease)     s/p stenting 2017, on ASA + Plavix, follows w/ Dr. Walters    Chronic back pain     on Norco daily, denies NSAIDS/steroids    Chronic narcotic use     managed by PCP    COPD (chronic obstructive pulmonary disease)     Depression     Diabetes     dx , never on insulin, A1C 9.4    Dyspepsia     Dyspnea on exertion     Edema     Fatigue     Former smoker     quit     Gastric bezoar     History endoscopic removal Dr. Fernández 2019    GERD (gastroesophageal reflux disease)     controlled on Omeprazole BID, recent EGD 3/2020 w/ Dr. Fernández    Hepatosplenomegaly     By CT scan    History of blood transfusion     With CABG     Hyperlipidemia     Hypertension     Insomnia     Joint pain     rheumatology eval (P)    Liver cirrhosis secondary to NARANJO     follows w/ Dr. Fernández in [place].  MELD 8    MI (myocardial infarction) 2010    Microcytic anemia     Morbid obesity     Neuropathy     on Amitriptyline + Carbamazapine    RLS (restless legs syndrome)     Sleep apnea     CPAP noncompliant last 6 months as above    Tobacco use disorder, severe, in early remission     Varices of spleen     And peritoneum CT scan 2020             Past Surgical History:   Procedure Laterality Date     SECTION  1987     SECTION      COLONOSCOPY      CORONARY ANGIOPLASTY WITH STENT PLACEMENT      CORONARY ARTERY BYPASS GRAFT  2010    2 units of packed red blood cells given    ENDOMETRIAL ABLATION      ENDOSCOPY      w/ Dr. Fernández    ENDOSCOPY  N/A 01/20/2021    Procedure: ESOPHAGOGASTRODUODENOSCOPY;  Surgeon: Raymundo Mcnamara MD;  Location:  [facility] OR;  Service: Bariatric;  Laterality: N/A;    GASTRIC SLEEVE LAPAROSCOPIC N/A 01/20/2021    Procedure: GASTRIC SLEEVE LAPAROSCOPIC;  Surgeon: Raymundo Mcnamara MD;  Location:  [facility] OR;  Service: Bariatric;  Laterality: N/A;    KNEE SURGERY Right 2015    Laparoscopic meniscus repair    LAPAROSCOPIC CHOLECYSTECTOMY  2016    dysfunction, no stones       Family History:  family history includes Arthritis in her mother; COPD in her maternal grandmother; Cancer in her paternal grandfather; Cirrhosis in her maternal grandfather; Colon polyps in her mother; Diabetes in her sister; Heart attack in her father; Heart disease in her father and mother; Hypertension in her father, maternal grandmother, mother, paternal grandmother, and sister; Kidney disease in her sister; Obesity in her father, maternal grandmother, mother, paternal grandmother, and sister; Stroke in her maternal grandmother.     Social History:  reports that she quit smoking about 7 years ago. Her smoking use included cigarettes. She started smoking about 44 years ago. She has a 55 pack-year smoking history. She has never used smokeless tobacco. She reports that she does not drink alcohol and does not use drugs.  Social History     Social History Narrative    Lives in [place], [place].   w/ 2 children (32yo and 23yo).  Unemployed.       Medications:  ALPRAZolam, Desvenlafaxine ER, FLUoxetine, aspirin, atorvastatin, carvedilol, cetirizine, fluticasone, freestyle, glucose blood, ibuprofen, isosorbide mononitrate, lisinopril, loratadine, metFORMIN, metoprolol tartrate, mirtazapine, montelukast, nitroglycerin, omeprazole, rOPINIRole, ranolazine, ticagrelor, and zolpidem    No Known Allergies    Objective   Objective     Vital Signs:   Temp:  [97.8 °F (36.6 °C)] 97.8 °F (36.6 °C)  Heart Rate:  [78] 78  Resp:  [16] 16  BP: (122)/(73)  122/73  Flow (L/min):  [1] 1    Physical Exam  Vitals and nursing note reviewed.   Constitutional:       General: She is not in acute distress.     Appearance: Normal appearance. She is not ill-appearing, toxic-appearing or diaphoretic.   HENT:      Head: Normocephalic and atraumatic.      Nose: Nose normal.      Mouth/Throat:      Mouth: Mucous membranes are dry.      Pharynx: Oropharynx is clear.   Eyes:      Extraocular Movements: Extraocular movements intact.      Conjunctiva/sclera: Conjunctivae normal.      Pupils: Pupils are equal, round, and reactive to light.   Cardiovascular:      Rate and Rhythm: Normal rate and regular rhythm.      Pulses: Normal pulses.      Heart sounds: Normal heart sounds.   Pulmonary:      Effort: Pulmonary effort is normal.      Breath sounds: Normal breath sounds.   Abdominal:      General: Bowel sounds are normal. There is no distension.      Palpations: Abdomen is soft. There is no mass.      Tenderness: There is no abdominal tenderness. There is no right CVA tenderness, left CVA tenderness, guarding or rebound.      Hernia: No hernia is present.   Musculoskeletal:         General: No swelling, tenderness, deformity or signs of injury. Normal range of motion.      Cervical back: Normal range of motion and neck supple.      Right lower leg: No edema.      Left lower leg: No edema.   Skin:     General: Skin is warm and dry.   Neurological:      General: No focal deficit present.      Mental Status: She is alert and oriented to person, place, and time. Mental status is at baseline.   Psychiatric:         Mood and Affect: Mood normal.         Behavior: Behavior normal.         Thought Content: Thought content normal.         Judgment: Judgment normal.            Result Review:  I have personally reviewed the results from the time of this admission to 8/8/2024 20:33 EDT and agree with these findings:  [x]  Laboratory list / accordion  [x]  Microbiology  [x]  Radiology  [x]   EKG/Telemetry   []  Cardiology/Vascular   []  Pathology  []  Old records  []  Other:  Most notable findings include:     LAB RESULTS:                              Brief Urine Lab Results       None          Microbiology Results (last 10 days)       ** No results found for the last 240 hours. **            No radiology results from the last 24 hrs        Assessment & Plan   Assessment & Plan       Chest pain    CAD (coronary artery disease)    Hypertension    Hyperlipidemia    Sleep apnea    Diabetes    Fatty liver    RLS (restless legs syndrome)    Depression    GERD (gastroesophageal reflux disease)    Insomnia    Hypomagnesemia      58-year-old female transferred from [facility] due to stable angina.    1) Unstable angina  [facility]  - Repeat labs pending  - Chest x-ray negative  - EKG with no acute ST changes compared to prior EKG in July 2022  -D-dimer  - Case was discussed with Dr. Ku whom agreed with transfer and possible heart cath tomorrow  [facility]  - Continue heparin drip  - Continue DAPT  -Check FLP, hemoglobin A1c  - N.p.o. after midnight    2) hypomagnesemia  - Magnesium 1.5 [facility] facility.  Received 2 g of magnesium  - Repeat labs pending  - Telemetry monitoring    3) essential hypertension  - On carvedilol, lisinopril, Imdur    4) NARANJO  -Hold hepatotoxic agents    5) GERD  - PPI    6) diabetes mellitus type 2  - Check hemoglobin A1c  - Fingersticks before meals and at bedtime  - Start sliding scale insulin    7) BELEM  -CPAP at HS    8) Insomnia  -takes ambien       DVT prophylaxis:  heparin drip     CODE STATUS:  Full Code        Expected Discharge  TBD     This note has been completed as part of a split-shared workflow.     Signature: Electronically signed by AYAZ Spencer, 08/08/24, 8:44 PM EDT.

## 2024-08-09 NOTE — PROGRESS NOTES
[facility] Medicine Services  PROGRESS NOTE    Patient Name: Alee Wu  : 1965  MRN: [id number]    Date of Admission: 2024  Primary Care Physician: Anna Pulido APRN    Subjective   Subjective     CC:  Chest pain    HPI:  Patient still reporting 5/10 chest tightness this morning. Somewhat improved from yesterday but still present. Admits to episode of nausea, chills, chest pain yesterday morning that was very concerning. Significant other present in room.      Objective   Objective     Vital Signs:   Temp:  [97.7 °F (36.5 °C)-98.8 °F (37.1 °C)] 98 °F (36.7 °C)  Heart Rate:  [77-93] 77  Resp:  [16] 16  BP: ()/(53-88) 91/53  Flow (L/min):  [1] 1     Physical Exam:  Constitutional: Awake, alert, resting comfortably  HENT: NCAT, mucous membranes moist  Respiratory: Clear to auscultation bilaterally, respiratory effort normal   Cardiovascular: RRR, no murmurs, rubs, or gallops  Gastrointestinal: soft, nontender, nondistended  Musculoskeletal: No bilateral ankle edema  Psychiatric: Appropriate affect, cooperative  Neurologic: Alert and oriented x 3, no focal deficits, speech clear  Skin: No rashes      Results Reviewed:  LAB RESULTS:      Lab 24  1154 24  0350 24   WBC  --  5.24  --  6.14   HEMOGLOBIN  --  12.0  --  12.0   HEMATOCRIT  --  37.7  --  37.3   PLATELETS  --  138*  --  139*   NEUTROS ABS  --  2.55  --  2.79   IMMATURE GRANS (ABS)  --  0.02  --  0.02   LYMPHS ABS  --  2.03  --  2.64   MONOS ABS  --  0.51  --  0.55   EOS ABS  --  0.10  --  0.11   MCV  --  79.7  --  79.4   PROTIME  --   --  14.7*  --    APTT  --   --  30.8*  --    HEPARIN ANTI-XA 0.30 0.14* 0.10*  --    D DIMER QUANT  --   --  <0.27  --          Lab 24  0350 24   SODIUM 143 138   POTASSIUM 3.8 3.9   CHLORIDE 107 104   CO2 29.0 23.0   ANION GAP 7.0 11.0   BUN 18 21*   CREATININE 0.83 0.70   EGFR 82.3 101.0   GLUCOSE 90 79   CALCIUM  9.0 8.8   MAGNESIUM  --  2.1   HEMOGLOBIN A1C 5.00  --          Lab 08/09/24  0350 08/08/24 2018   TOTAL PROTEIN 6.9 7.1   ALBUMIN 3.9 4.2   GLOBULIN 3.0 2.9   ALT (SGPT) 13 13   AST (SGOT) 18 17   BILIRUBIN 0.4 0.6   ALK PHOS 94 91         Lab 08/08/24  2226 08/08/24 2038 08/08/24 2018   PROBNP  --   --  <36.0   HSTROP T <6  --  <6   PROTIME  --  14.7*  --    INR  --  1.14*  --          Lab 08/09/24  0350   CHOLESTEROL 141   LDL CHOL 72   HDL CHOL 40   TRIGLYCERIDES 169*             Brief Urine Lab Results       None            Microbiology Results Abnormal       None            Cardiac Catheterization/Vascular Study    Result Date: 8/9/2024    Occluded RCA with widely patent SVG to right PDA.  Supplies collaterals to distal LAD and OM 2 as well.   Occluded obtuse marginal with occluded vein graft (chronic).   Occluded proximal LAD.  Patent LIMA to the mid LAD, however no significant flow due to proximal and distal anastomotic subtotal occlusions.   First diagonal branch with 70% heavily calcified tortuous artery stenosis.   LVEF 70%          Current medications:  Scheduled Meds:aspirin, 81 mg, Oral, Daily  atorvastatin, 40 mg, Oral, Daily  carvedilol, 12.5 mg, Oral, Q12H  cetirizine, 10 mg, Oral, Daily  fluticasone, 2 spray, Each Nare, Daily  insulin lispro, 2-7 Units, Subcutaneous, 4x Daily AC & at Bedtime  isosorbide mononitrate, 90 mg, Oral, Daily  lisinopril, 5 mg, Oral, Daily  pantoprazole, 40 mg, Oral, Q AM  ranolazine, 500 mg, Oral, BID  sodium chloride, 10 mL, Intravenous, Q12H  ticagrelor, 90 mg, Oral, Q12H      Continuous Infusions:Pharmacy to Dose Heparin,   [START ON 8/10/2024] sodium chloride, 100 mL/hr      PRN Meds:.  acetaminophen    ALPRAZolam    ALPRAZolam    senna-docusate sodium **AND** polyethylene glycol **AND** bisacodyl **AND** bisacodyl    dextrose    dextrose    diazePAM    diphenhydrAMINE    glucagon (human recombinant)    HYDROcodone-acetaminophen    montelukast    nitroglycerin     nitroglycerin    Pharmacy to Dose Heparin    sodium chloride    sodium chloride    zolpidem    Assessment & Plan   Assessment & Plan     Active Hospital Problems    Diagnosis  POA    **Chest pain [R07.9]  Yes    Hypomagnesemia [E83.42]  Yes    Unstable angina [I20.0]  Unknown    CAD (coronary artery disease) [I25.10]  Yes    Depression [F32.A]  Yes    Diabetes [E11.9]  Yes    Fatty liver [K76.0]  Yes    GERD (gastroesophageal reflux disease) [K21.9]  Yes    Hyperlipidemia [E78.5]  Yes    Hypertension [I10]  Yes    Insomnia [G47.00]  Yes    RLS (restless legs syndrome) [G25.81]  Yes    Sleep apnea [G47.30]  Yes      Resolved Hospital Problems   No resolved problems to display.        Brief Hospital Course to date:  Alee Wu is a 58 y.o. female with PMHx of anxiety, depression, CAD status post CABG x 4 in 2010, hypertension, hyperlipidemia, diabetes mellitus type 2, chronic back pain, COPD, RLS, NARANJO and BELEM presented initially to [facility] with complaint of chest pain at home which improved with nitroglycerin. Patient transferred to [facility] for cardiology evaluation.    This patient's problems and plans were partially entered by my partner and updated as appropriate by me 08/09/24.    Unstable angina  CAD s/p CABG 2010  -troponin negative x2 on admission   -Continue heparin infusion, DAPT, ranolazine.   -Cardiology evaluated, planning for cardiac catheterization +/- PCI.     Hypomagnesemia  -Replace per protocol.      Essential hypertension  -on carvedilol, lisinopril, Imdur     NARANJO  -Hold hepatotoxic agents     GERD  -PPI     Diabetes mellitus type 2  -A1c 5%  -Low dose SSI while admitted.     BELEM  -CPAP at      Insomnia  -on ambien    Expected Discharge Location and Transportation: Home  Expected Discharge   Expected Discharge Date: 8/10/2024; Expected Discharge Time:      VTE Prophylaxis:  Pharmacologic & mechanical VTE prophylaxis orders are present.         AM-PAC 6 Clicks Score (PT): 24 (08/09/24  0820)    CODE STATUS:   Code Status and Medical Interventions: CPR (Attempt to Resuscitate); Full Support   Ordered at: 08/08/24 2049     Level Of Support Discussed With:    Patient     Code Status (Patient has no pulse and is not breathing):    CPR (Attempt to Resuscitate)     Medical Interventions (Patient has pulse or is breathing):    Full Support       Heather Hanson,   08/09/24

## 2024-08-09 NOTE — PROGRESS NOTES
HEPARIN INFUSION  Alee Wu is a  58 y.o. female receiving heparin infusion.     Therapy for (VTE/Cardiac):   cardiac  Patient Weight: 77  Initial Bolus (Y/N):   n  Any Bolus (Y/N):   y        Signs or Symptoms of Bleeding: no    Cardiac or Other (Not VTE)   Initial Bolus: 60 units/kg (Max 4,000 units)  Initial rate: 12 units/kg/hr (Max 1,000 units/hr)   Anti Xa Rebolus Infusion Hold time Change infusion Dose (Units/kg/hr) Next Anti Xa or aPTT Level Due   < 0.11 50 Units/kg  (4000 Units Max) None Increase by  3 Units/kg/hr 6 hours   0.11- 0.19 25 Units/kg  (2000 Units Max) None Increase by  2 Units/kg/hr 6 hours   0.2 - 0.29 0 None Increase by  1 Units/kg/hr 6 hours   0.3 - 0.5 0 None No Change 6 hours (after 2 consecutive levels in range check qAM)   0.51 - 0.6 0 None Decrease by  1 Units/kg/hr 6 hours   0.61 - 0.8 0 30 Minutes Decrease by  2 Units/kg/hr 6 hours   0.81 - 1 0 60 Minutes Decrease by  3 Units/kg/hr 6 hours   >1 0 Hold  After Anti Xa less than 0.5 decrease previous rate by  4 Units/kg/hr  Every 2 hours until Anti Xa  less than 0.5 then when infusion restarts in 6 hours     Recommend Xa every 6 hours.   Results from last 7 days   Lab Units 08/08/24 2038 08/08/24 2018   INR  1.14*  --    HEMOGLOBIN g/dL  --  12.0   HEMATOCRIT %  --  37.3   PLATELETS 10*3/mm3  --  139*          Date   Time   Anti-Xa Current Rate (Unit/kg/hr) Bolus   (Units) Rate Change   (Unit/kg/hr) New Rate (Unit/kg/hr) Next   Anti-Xa Comments  Pump Check Daily   8/8 2111 Pending  0 0 +12 12 0300 DW EDITH Gaston, [facility]  8/8/2024  21:10 EDT

## 2024-08-10 PROBLEM — R07.9 CHEST PAIN: Status: RESOLVED | Noted: 2024-08-08 | Resolved: 2024-08-10

## 2024-08-10 PROBLEM — I20.0 UNSTABLE ANGINA: Status: RESOLVED | Noted: 2024-08-08 | Resolved: 2024-08-10

## 2024-08-10 PROBLEM — E83.42 HYPOMAGNESEMIA: Status: RESOLVED | Noted: 2024-08-08 | Resolved: 2024-08-10

## 2024-08-10 LAB
ANION GAP SERPL CALCULATED.3IONS-SCNC: 7 MMOL/L (ref 5–15)
BUN SERPL-MCNC: 16 MG/DL (ref 6–20)
BUN/CREAT SERPL: 21.6 (ref 7–25)
CALCIUM SPEC-SCNC: 8.7 MG/DL (ref 8.6–10.5)
CHLORIDE SERPL-SCNC: 107 MMOL/L (ref 98–107)
CHOLEST SERPL-MCNC: 115 MG/DL (ref 0–200)
CO2 SERPL-SCNC: 25 MMOL/L (ref 22–29)
CREAT SERPL-MCNC: 0.74 MG/DL (ref 0.57–1)
DEPRECATED RDW RBC AUTO: 39.6 FL (ref 37–54)
EGFRCR SERPLBLD CKD-EPI 2021: 94.5 ML/MIN/1.73
ERYTHROCYTE [DISTWIDTH] IN BLOOD BY AUTOMATED COUNT: 13.5 % (ref 12.3–15.4)
GLUCOSE BLDC GLUCOMTR-MCNC: 65 MG/DL (ref 70–130)
GLUCOSE BLDC GLUCOMTR-MCNC: 84 MG/DL (ref 70–130)
GLUCOSE BLDC GLUCOMTR-MCNC: 88 MG/DL (ref 70–130)
GLUCOSE BLDC GLUCOMTR-MCNC: 91 MG/DL (ref 70–130)
GLUCOSE SERPL-MCNC: 111 MG/DL (ref 65–99)
HBA1C MFR BLD: 4.9 % (ref 4.8–5.6)
HCT VFR BLD AUTO: 33.4 % (ref 34–46.6)
HCT VFR BLD AUTO: 33.6 % (ref 34–46.6)
HDLC SERPL-MCNC: 36 MG/DL (ref 40–60)
HGB BLD-MCNC: 10.6 G/DL (ref 12–15.9)
HGB BLD-MCNC: 10.7 G/DL (ref 12–15.9)
LDLC SERPL CALC-MCNC: 52 MG/DL (ref 0–100)
LDLC/HDLC SERPL: 1.3 {RATIO}
MCH RBC QN AUTO: 25.9 PG (ref 26.6–33)
MCHC RBC AUTO-ENTMCNC: 31.5 G/DL (ref 31.5–35.7)
MCV RBC AUTO: 82 FL (ref 79–97)
PLATELET # BLD AUTO: 99 10*3/MM3 (ref 140–450)
PMV BLD AUTO: 9.2 FL (ref 6–12)
POTASSIUM SERPL-SCNC: 4.6 MMOL/L (ref 3.5–5.2)
RBC # BLD AUTO: 4.1 10*6/MM3 (ref 3.77–5.28)
SODIUM SERPL-SCNC: 139 MMOL/L (ref 136–145)
TRIGL SERPL-MCNC: 161 MG/DL (ref 0–150)
VLDLC SERPL-MCNC: 27 MG/DL (ref 5–40)
WBC NRBC COR # BLD AUTO: 4 10*3/MM3 (ref 3.4–10.8)

## 2024-08-10 PROCEDURE — 85014 HEMATOCRIT: CPT | Performed by: NURSE PRACTITIONER

## 2024-08-10 PROCEDURE — 99232 SBSQ HOSP IP/OBS MODERATE 35: CPT | Performed by: NURSE PRACTITIONER

## 2024-08-10 PROCEDURE — 80048 BASIC METABOLIC PNL TOTAL CA: CPT | Performed by: INTERNAL MEDICINE

## 2024-08-10 PROCEDURE — 83036 HEMOGLOBIN GLYCOSYLATED A1C: CPT | Performed by: INTERNAL MEDICINE

## 2024-08-10 PROCEDURE — 25810000003 SODIUM CHLORIDE 0.9 % SOLUTION: Performed by: INTERNAL MEDICINE

## 2024-08-10 PROCEDURE — 85018 HEMOGLOBIN: CPT | Performed by: NURSE PRACTITIONER

## 2024-08-10 PROCEDURE — 85027 COMPLETE CBC AUTOMATED: CPT | Performed by: INTERNAL MEDICINE

## 2024-08-10 PROCEDURE — 80061 LIPID PANEL: CPT | Performed by: INTERNAL MEDICINE

## 2024-08-10 PROCEDURE — G0378 HOSPITAL OBSERVATION PER HR: HCPCS

## 2024-08-10 PROCEDURE — 82948 REAGENT STRIP/BLOOD GLUCOSE: CPT

## 2024-08-10 PROCEDURE — 99213 OFFICE O/P EST LOW 20 MIN: CPT | Performed by: INTERNAL MEDICINE

## 2024-08-10 RX ORDER — NITROGLYCERIN 0.4 MG/1
0.4 TABLET SUBLINGUAL
Status: DISCONTINUED | OUTPATIENT
Start: 2024-08-10 | End: 2024-08-11 | Stop reason: HOSPADM

## 2024-08-10 RX ORDER — RANOLAZINE 500 MG/1
1000 TABLET, EXTENDED RELEASE ORAL 2 TIMES DAILY
Status: DISCONTINUED | OUTPATIENT
Start: 2024-08-10 | End: 2024-08-11 | Stop reason: HOSPADM

## 2024-08-10 RX ORDER — ALPRAZOLAM 1 MG/1
1 TABLET ORAL 2 TIMES DAILY PRN
Status: DISCONTINUED | OUTPATIENT
Start: 2024-08-10 | End: 2024-08-11 | Stop reason: HOSPADM

## 2024-08-10 RX ORDER — ALPRAZOLAM 1 MG/1
2 TABLET ORAL NIGHTLY PRN
Status: DISCONTINUED | OUTPATIENT
Start: 2024-08-10 | End: 2024-08-11 | Stop reason: HOSPADM

## 2024-08-10 RX ADMIN — Medication 10 ML: at 21:48

## 2024-08-10 RX ADMIN — SODIUM CHLORIDE 100 ML/HR: 9 INJECTION, SOLUTION INTRAVENOUS at 02:03

## 2024-08-10 RX ADMIN — LISINOPRIL 5 MG: 5 TABLET ORAL at 08:42

## 2024-08-10 RX ADMIN — TICAGRELOR 90 MG: 90 TABLET ORAL at 08:42

## 2024-08-10 RX ADMIN — CARVEDILOL 12.5 MG: 12.5 TABLET, FILM COATED ORAL at 08:42

## 2024-08-10 RX ADMIN — SODIUM CHLORIDE 100 ML/HR: 9 INJECTION, SOLUTION INTRAVENOUS at 15:11

## 2024-08-10 RX ADMIN — ALPRAZOLAM 1 MG: 1 TABLET ORAL at 21:53

## 2024-08-10 RX ADMIN — ZOLPIDEM TARTRATE 5 MG: 5 TABLET ORAL at 23:53

## 2024-08-10 RX ADMIN — TICAGRELOR 90 MG: 90 TABLET ORAL at 21:45

## 2024-08-10 RX ADMIN — RANOLAZINE 500 MG: 500 TABLET, FILM COATED, EXTENDED RELEASE ORAL at 08:42

## 2024-08-10 RX ADMIN — ISOSORBIDE MONONITRATE 90 MG: 60 TABLET, EXTENDED RELEASE ORAL at 08:42

## 2024-08-10 RX ADMIN — CARVEDILOL 12.5 MG: 12.5 TABLET, FILM COATED ORAL at 21:45

## 2024-08-10 RX ADMIN — RANOLAZINE 1000 MG: 500 TABLET, FILM COATED, EXTENDED RELEASE ORAL at 21:45

## 2024-08-10 RX ADMIN — Medication 10 ML: at 08:42

## 2024-08-10 RX ADMIN — ASPIRIN 81 MG: 81 TABLET, COATED ORAL at 08:42

## 2024-08-10 RX ADMIN — FLUTICASONE PROPIONATE 2 SPRAY: 50 SPRAY, METERED NASAL at 08:43

## 2024-08-10 RX ADMIN — CETIRIZINE HYDROCHLORIDE 10 MG: 10 TABLET, FILM COATED ORAL at 08:42

## 2024-08-10 RX ADMIN — ATORVASTATIN CALCIUM 40 MG: 40 TABLET, FILM COATED ORAL at 08:42

## 2024-08-10 RX ADMIN — PANTOPRAZOLE SODIUM 40 MG: 40 TABLET, DELAYED RELEASE ORAL at 05:17

## 2024-08-10 NOTE — PLAN OF CARE
Goal Outcome Evaluation:  Plan of Care Reviewed With: patient           Outcome Evaluation: Pt a/o, VSS, RA, sat. 92%, no c/o pain at this time. Continue monitoring.

## 2024-08-10 NOTE — PROGRESS NOTES
"[facility] Cardiology Daily Note       LOS: 0 days   Patient Care Team:  Anna Pulido APRN as PCP - General (Nurse Practitioner)    Chief Complaint: Unstable angina    Subjective     Subjective: Very anxious has not had her Xanax which she takes as needed.  She continues to have some chest discomfort through to her back.  She notes that it seems to be a little bit better since she came in but she feels very weak with low blood pressures.  96% on room air.  Blood pressures have been marginal overnight.  Heart rates in the 80s.    Review of Systems:   As above.    Medications:  aspirin, 81 mg, Oral, Daily  atorvastatin, 40 mg, Oral, Daily  carvedilol, 12.5 mg, Oral, Q12H  cetirizine, 10 mg, Oral, Daily  fluticasone, 2 spray, Each Nare, Daily  isosorbide mononitrate, 90 mg, Oral, Daily  lisinopril, 5 mg, Oral, Daily  pantoprazole, 40 mg, Oral, Q AM  ranolazine, 500 mg, Oral, BID  sodium chloride, 10 mL, Intravenous, Q12H  ticagrelor, 90 mg, Oral, Q12H        Objective     Vital Sign Min/Max for last 24 hours  Temp  Min: 98 °F (36.7 °C)  Max: 98.3 °F (36.8 °C)   BP  Min: 88/61  Max: 131/63   Pulse  Min: 75  Max: 88   Resp  Min: 16  Max: 18   SpO2  Min: 94 %  Max: 99 %   No data recorded   No data recorded    No intake or output data in the 24 hours ending 08/10/24 1709     Flowsheet Rows      Flowsheet Row First Filed Value   Admission Height 167.6 cm (66\") Documented at 08/08/2024 2002   Admission Weight 77 kg (169 lb 12.8 oz) Documented at 08/08/2024 1900            Physical Exam:    General: Alert and oriented.   Cardiovascular: Heart has a nondisplaced focal PMI. Regular rate and rhythm without murmur, gallop or rub.  Lungs: Clear without rales or wheezes. Equal expansion is noted.   Abdomen: Soft, nontender.  Extremities: Show no edema. Good left radial pulse  Skin: warm and dry.     Results Review:    I reviewed the patient's new clinical results.  EKG:  Tele: Sinus rhythm in the " "80s    Labs:    Results from last 7 days   Lab Units 08/10/24  0413 08/09/24  0350 08/08/24 2018   SODIUM mmol/L 139 143 138   POTASSIUM mmol/L 4.6 3.8 3.9   CHLORIDE mmol/L 107 107 104   CO2 mmol/L 25.0 29.0 23.0   BUN mg/dL 16 18 21*   CREATININE mg/dL 0.74 0.83 0.70   CALCIUM mg/dL 8.7 9.0 8.8   BILIRUBIN mg/dL  --  0.4 0.6   ALK PHOS U/L  --  94 91   ALT (SGPT) U/L  --  13 13   AST (SGOT) U/L  --  18 17   GLUCOSE mg/dL 111* 90 79     Results from last 7 days   Lab Units 08/10/24  1556 08/10/24  0413 08/09/24  0350 08/08/24 2018   WBC 10*3/mm3  --  4.00 5.24 6.14   HEMOGLOBIN g/dL 10.7* 10.6* 12.0 12.0   HEMATOCRIT % 33.4* 33.6* 37.7 37.3   PLATELETS 10*3/mm3  --  99* 138* 139*     Lab Results   Component Value Date    TROPONINT <6 08/08/2024    TROPONINT <6 08/08/2024     Lab Results   Component Value Date    CHOL 115 08/10/2024    CHOL 141 08/09/2024     Lab Results   Component Value Date    TRIG 161 (H) 08/10/2024    TRIG 169 (H) 08/09/2024     Lab Results   Component Value Date    HDL 36 (L) 08/10/2024    HDL 40 08/09/2024     No components found for: \"LDLCALC\"  Lab Results   Component Value Date    INR 1.14 (H) 08/08/2024    INR 1.08 08/06/2023    INR 1.04 01/01/2023    PROTIME 14.7 (H) 08/08/2024    PROTIME 14.1 08/06/2023    PROTIME 11.2 01/01/2023         Ejection Fraction:    Assessment   Assessment:  CAD  Unstable angina  Left heart catheterization 8/9/2024 patent SVG to the PDA with collaterals to the distal LAD and OM 2 patent LIMA to the LAD, 70% D1 heavily calcified.  EF 70%.  Medical management recommended.  Hypertension  Dyslipidemia, stable.  LDL 72.  Diabetes, on metformin as an outpatient.      Plan:  Begin Ranexa 500 mg every 12 hours first now  Xanax 1 mg twice daily as needed  If the Ranexa works well we may be able to cut back on the isosorbide since her blood pressures been as low as 85 systolic overnight.    Daksha Brownlee MD  08/10/24  17:09 EDT          "

## 2024-08-10 NOTE — PROGRESS NOTES
[facility] Medicine Services  PROGRESS NOTE    Patient Name: Alee Wu  : 1965  MRN: [id number]    Date of Admission: 2024  Primary Care Physician: Anna Pulido APRN    Subjective   Subjective     CC:  F/u chest pain    HPI:  Resting in bed.  Tells me her blood sugar was low overnight.  Patient also with low BP.  Says she feels lightheaded, woozy.  Is getting IV fluids at 100 mL/h.  Reviewed patient's home medications and updated med rec accordingly.  Describes chest pain as more mild discomfort, denies any radiating pain.      Objective   Objective     Vital Signs:   Temp:  [98 °F (36.7 °C)-98.3 °F (36.8 °C)] 98 °F (36.7 °C)  Heart Rate:  [75-88] 85  Resp:  [16-18] 16  BP: ()/(53-88) 97/61     Physical Exam:  Constitutional: No acute distress, awake, alert  HENT: NCAT, mucous membranes moist  Respiratory: Clear to auscultation bilaterally, respiratory effort normal, room air  Cardiovascular: RRR, no murmurs, rubs, or gallops  Gastrointestinal: Positive bowel sounds, soft, nontender, nondistended  Musculoskeletal: No bilateral ankle edema  Psychiatric: Appropriate affect, cooperative  Neurologic: Oriented x 3, moves all extremities, speech clear  Skin: No rashes      Results Reviewed:  LAB RESULTS:      Lab 08/10/24  0413 24  1154 24  0350 24   WBC 4.00  --  5.24  --  6.14   HEMOGLOBIN 10.6*  --  12.0  --  12.0   HEMATOCRIT 33.6*  --  37.7  --  37.3   PLATELETS 99*  --  138*  --  139*   NEUTROS ABS  --   --  2.55  --  2.79   IMMATURE GRANS (ABS)  --   --  0.02  --  0.02   LYMPHS ABS  --   --  2.03  --  2.64   MONOS ABS  --   --  0.51  --  0.55   EOS ABS  --   --  0.10  --  0.11   MCV 82.0  --  79.7  --  79.4   PROTIME  --   --   --  14.7*  --    APTT  --   --   --  30.8*  --    HEPARIN ANTI-XA  --  0.30 0.14* 0.10*  --    D DIMER QUANT  --   --   --  <0.27  --          Lab 08/10/24  0413 24  0350 24  2018    SODIUM 139 143 138   POTASSIUM 4.6 3.8 3.9   CHLORIDE 107 107 104   CO2 25.0 29.0 23.0   ANION GAP 7.0 7.0 11.0   BUN 16 18 21*   CREATININE 0.74 0.83 0.70   EGFR 94.5 82.3 101.0   GLUCOSE 111* 90 79   CALCIUM 8.7 9.0 8.8   MAGNESIUM  --   --  2.1   HEMOGLOBIN A1C  --  5.00  --          Lab 08/09/24  0350 08/08/24 2018   TOTAL PROTEIN 6.9 7.1   ALBUMIN 3.9 4.2   GLOBULIN 3.0 2.9   ALT (SGPT) 13 13   AST (SGOT) 18 17   BILIRUBIN 0.4 0.6   ALK PHOS 94 91         Lab 08/08/24 2226 08/08/24 2038 08/08/24 2018   PROBNP  --   --  <36.0   HSTROP T <6  --  <6   PROTIME  --  14.7*  --    INR  --  1.14*  --          Lab 08/10/24  0413 08/09/24  0350   CHOLESTEROL 115 141   LDL CHOL 52 72   HDL CHOL 36* 40   TRIGLYCERIDES 161* 169*             Brief Urine Lab Results       None            Microbiology Results Abnormal       None            Cardiac Catheterization/Vascular Study    Result Date: 8/9/2024    Occluded RCA with widely patent SVG to right PDA.  Supplies collaterals to distal LAD and OM 2 as well.   Occluded obtuse marginal with occluded vein graft (chronic).   Occluded proximal LAD.  Patent LIMA to the mid LAD, however no significant flow due to proximal and distal anastomotic subtotal occlusions.   First diagonal branch with 70% heavily calcified tortuous artery stenosis.   LVEF 70%          Current medications:  Scheduled Meds:aspirin, 81 mg, Oral, Daily  atorvastatin, 40 mg, Oral, Daily  carvedilol, 12.5 mg, Oral, Q12H  cetirizine, 10 mg, Oral, Daily  fluticasone, 2 spray, Each Nare, Daily  isosorbide mononitrate, 90 mg, Oral, Daily  lisinopril, 5 mg, Oral, Daily  pantoprazole, 40 mg, Oral, Q AM  ranolazine, 500 mg, Oral, BID  sodium chloride, 10 mL, Intravenous, Q12H  ticagrelor, 90 mg, Oral, Q12H      Continuous Infusions:sodium chloride, 100 mL/hr, Last Rate: 100 mL/hr (08/10/24 0818)      PRN Meds:.  acetaminophen    ALPRAZolam    senna-docusate sodium **AND** polyethylene glycol **AND** bisacodyl  **AND** bisacodyl    diazePAM    diphenhydrAMINE    montelukast    nitroglycerin    ondansetron    sodium chloride    sodium chloride    zolpidem    Assessment & Plan   Assessment & Plan     Active Hospital Problems    Diagnosis  POA    Unstable angina [I20.0]  Unknown    CAD (coronary artery disease) [I25.10]  Yes    Depression [F32.A]  Yes    Diabetes [E11.9]  Yes    Fatty liver [K76.0]  Yes    GERD (gastroesophageal reflux disease) [K21.9]  Yes    Hyperlipidemia [E78.5]  Yes    Hypertension [I10]  Yes    Insomnia [G47.00]  Yes    RLS (restless legs syndrome) [G25.81]  Yes    Sleep apnea [G47.30]  Yes      Resolved Hospital Problems    Diagnosis Date Resolved POA    **Chest pain [R07.9] 08/10/2024 Yes    Hypomagnesemia [E83.42] 08/10/2024 Yes        Brief Hospital Course to date:  Alee Wu is a 58 y.o. female with PMHx of anxiety, depression, CAD status post CABG x 4 in 2010, hypertension, hyperlipidemia, diabetes mellitus type 2, chronic back pain, COPD, RLS, NARANJO and BELEM presented initially to [facility] with complaint of chest pain at home which improved with nitroglycerin. Patient transferred to [facility] for cardiology evaluation.     This patient's problems and plans were partially entered by my partner and updated as appropriate by me 08/10/24.      Unstable angina  CAD s/p CABG 2010  -troponin negative x2 on admission   -Initiated on heparin infusion  -DAPT, ranolazine.   -Cardiology consulted. Underwent LHC with Dr. Smith on 8/9 without interventions.  EF 70%.    Hypotension  -Patient hypotensive this a.m.  Off BP meds given per MAR.    -IV fluids at 100 mL/h.   -Defer to cardiology for adjustment of BP meds.  -Check H&H q8h x 24 hrs.  Hemoglobin 12 on admission, 10.6 this morning.  -AM BMP    Hypomagnesemia  -Replace per protocol  -AM mag     Essential hypertension  -on carvedilol, lisinopril, Imdur     NARANJO  -Hold hepatotoxic agents     GERD  -PPI     Diabetes mellitus type 2  -A1c 5%  -Patient  on Mounjaro only at home.  No longer takes metformin  -Stop SSI.  Patient with greater than 4 BG's less than 140 and episode of low BG 65 this morning.     BELEM  -CPAP at HS     Insomnia  -on ambien    Expected Discharge Location and Transportation: Home  Expected Discharge pending cardiology recommendations, improvement in BP  Expected Discharge Date: 8/10/2024; Expected Discharge Time:      VTE Prophylaxis:  Mechanical VTE prophylaxis orders are present.         AM-PAC 6 Clicks Score (PT): 24 (08/09/24 2010)    CODE STATUS:   Code Status and Medical Interventions: CPR (Attempt to Resuscitate); Full Support   Ordered at: 08/08/24 2049     Level Of Support Discussed With:    Patient     Code Status (Patient has no pulse and is not breathing):    CPR (Attempt to Resuscitate)     Medical Interventions (Patient has pulse or is breathing):    Full Support       Jessie Laura, APRN  08/10/24

## 2024-08-11 VITALS
SYSTOLIC BLOOD PRESSURE: 147 MMHG | HEART RATE: 90 BPM | RESPIRATION RATE: 16 BRPM | DIASTOLIC BLOOD PRESSURE: 65 MMHG | OXYGEN SATURATION: 97 % | HEIGHT: 66 IN | WEIGHT: 169.8 LBS | BODY MASS INDEX: 27.29 KG/M2 | TEMPERATURE: 98.2 F

## 2024-08-11 LAB
ANION GAP SERPL CALCULATED.3IONS-SCNC: 8 MMOL/L (ref 5–15)
BUN SERPL-MCNC: 12 MG/DL (ref 6–20)
BUN/CREAT SERPL: 15 (ref 7–25)
CALCIUM SPEC-SCNC: 8.7 MG/DL (ref 8.6–10.5)
CHLORIDE SERPL-SCNC: 108 MMOL/L (ref 98–107)
CO2 SERPL-SCNC: 26 MMOL/L (ref 22–29)
CREAT SERPL-MCNC: 0.8 MG/DL (ref 0.57–1)
EGFRCR SERPLBLD CKD-EPI 2021: 86.1 ML/MIN/1.73
GLUCOSE SERPL-MCNC: 83 MG/DL (ref 65–99)
HCT VFR BLD AUTO: 32.8 % (ref 34–46.6)
HCT VFR BLD AUTO: 35 % (ref 34–46.6)
HGB BLD-MCNC: 10.6 G/DL (ref 12–15.9)
HGB BLD-MCNC: 11.2 G/DL (ref 12–15.9)
MAGNESIUM SERPL-MCNC: 1.6 MG/DL (ref 1.6–2.6)
POTASSIUM SERPL-SCNC: 4.4 MMOL/L (ref 3.5–5.2)
SODIUM SERPL-SCNC: 142 MMOL/L (ref 136–145)

## 2024-08-11 PROCEDURE — 85014 HEMATOCRIT: CPT | Performed by: NURSE PRACTITIONER

## 2024-08-11 PROCEDURE — 85018 HEMOGLOBIN: CPT | Performed by: NURSE PRACTITIONER

## 2024-08-11 PROCEDURE — 83735 ASSAY OF MAGNESIUM: CPT | Performed by: NURSE PRACTITIONER

## 2024-08-11 PROCEDURE — 99213 OFFICE O/P EST LOW 20 MIN: CPT

## 2024-08-11 PROCEDURE — 80048 BASIC METABOLIC PNL TOTAL CA: CPT | Performed by: NURSE PRACTITIONER

## 2024-08-11 PROCEDURE — G0378 HOSPITAL OBSERVATION PER HR: HCPCS

## 2024-08-11 PROCEDURE — 99239 HOSP IP/OBS DSCHRG MGMT >30: CPT | Performed by: NURSE PRACTITIONER

## 2024-08-11 RX ORDER — RANOLAZINE 1000 MG/1
1000 TABLET, EXTENDED RELEASE ORAL 2 TIMES DAILY
Qty: 60 TABLET | Refills: 1 | Status: SHIPPED | OUTPATIENT
Start: 2024-08-11

## 2024-08-11 RX ORDER — CARVEDILOL 12.5 MG/1
12.5 TABLET ORAL EVERY 12 HOURS SCHEDULED
Qty: 60 TABLET | Refills: 1 | Status: SHIPPED | OUTPATIENT
Start: 2024-08-11

## 2024-08-11 RX ORDER — ONDANSETRON 4 MG/1
4 TABLET, ORALLY DISINTEGRATING ORAL EVERY 8 HOURS PRN
Qty: 20 TABLET | Refills: 1 | Status: SHIPPED | OUTPATIENT
Start: 2024-08-11

## 2024-08-11 RX ORDER — ACETAMINOPHEN 325 MG/1
650 TABLET ORAL EVERY 4 HOURS PRN
Start: 2024-08-11

## 2024-08-11 RX ADMIN — CETIRIZINE HYDROCHLORIDE 10 MG: 10 TABLET, FILM COATED ORAL at 09:52

## 2024-08-11 RX ADMIN — Medication 10 ML: at 09:52

## 2024-08-11 RX ADMIN — PANTOPRAZOLE SODIUM 40 MG: 40 TABLET, DELAYED RELEASE ORAL at 05:15

## 2024-08-11 RX ADMIN — ASPIRIN 81 MG: 81 TABLET, COATED ORAL at 09:52

## 2024-08-11 RX ADMIN — TICAGRELOR 90 MG: 90 TABLET ORAL at 09:52

## 2024-08-11 RX ADMIN — ISOSORBIDE MONONITRATE 90 MG: 60 TABLET, EXTENDED RELEASE ORAL at 09:52

## 2024-08-11 RX ADMIN — CARVEDILOL 12.5 MG: 12.5 TABLET, FILM COATED ORAL at 09:52

## 2024-08-11 RX ADMIN — RANOLAZINE 1000 MG: 500 TABLET, FILM COATED, EXTENDED RELEASE ORAL at 09:52

## 2024-08-11 RX ADMIN — ATORVASTATIN CALCIUM 40 MG: 40 TABLET, FILM COATED ORAL at 09:52

## 2024-08-11 NOTE — DISCHARGE SUMMARY
[facility] Medicine Services  DISCHARGE SUMMARY    Patient Name: Alee Wu  : 1965  MRN: [id number]    Date of Admission: 2024  7:31 PM  Date of Discharge:  2024  Primary Care Physician: Anna Pulido APRN    Consults       Date and Time Order Name Status Description    2024  8:49 PM Inpatient Cardiology Consult Completed             Hospital Course       Active Hospital Problems    Diagnosis  POA    Unstable angina [I20.0]  Unknown    CAD (coronary artery disease) [I25.10]  Yes    Depression [F32.A]  Yes    Diabetes [E11.9]  Yes    Fatty liver [K76.0]  Yes    GERD (gastroesophageal reflux disease) [K21.9]  Yes    Hyperlipidemia [E78.5]  Yes    Hypertension [I10]  Yes    Insomnia [G47.00]  Yes    RLS (restless legs syndrome) [G25.81]  Yes    Sleep apnea [G47.30]  Yes      Resolved Hospital Problems    Diagnosis Date Resolved POA    **Chest pain [R07.9] 08/10/2024 Yes    Hypomagnesemia [E83.42] 08/10/2024 Yes      Hospital Course:  Alee Wu is a 58 y.o. female with PMHx of anxiety, depression, CAD status post CABG x 4 in , hypertension, hyperlipidemia, diabetes mellitus type 2, chronic back pain, COPD, RLS, NARANJO and BELEM presented initially to [facility] with complaint of chest pain at home which improved with nitroglycerin. Patient transferred to [facility] for cardiology evaluation.     Unstable angina  CAD s/p CABG   -troponin negative x2 on admission   -Initiated on heparin infusion, stopped at Dc  -DAPT, ranolazine.   -Cardiology consulted. Underwent LHC with Dr. Smith on  without interventions.  EF 70%.     Hypotension  -Patient hypotensive this a.m.  Off BP meds given per MAR.    -IV fluids at 100 mL/h.   -Defer to cardiology for adjustment of BP meds.  -Hgb 11.2,  Hemoglobin 12 on admission, 10.6 this morning.     Hypomagnesemia  -Replace per protocol     Essential hypertension  -on carvedilol, lisinopril, Imdur     NARANJO  -Hold  hepatotoxic agents     GERD  -PPI     T2DM w/A1c 4.6%  -Patient on Mounjaro only at home.  No longer takes metformin  -Stop SSI.  Patient with greater than 4 BG's less than 140 and episode of low BG 65 this morning.     BELEM  -CPAP at HS     Insomnia  -on ambien    Patient is medically ready for discharge home every transported by her family.  Discharge follow-up recommendations and appointments are listed below and in the AVS.    Discharge Follow Up Recommendations for outpatient labs/diagnostics:  -Follow-up with your family doctor in 1 week  -Follow-up with Dr. Smith in 4 weeks  -Take Coreg 12.5 mg tablet every 12 hours  -Take Ranexa 1000 mg tablet 2 times a day  -Stop taking Prozac, lisinopril, metoprolol, Singulair, metformin, Remeron, Claritin and Remeron  -Return to work only working half shifts Monday through Wednesday, then you can return to regular duty hours    Day of Discharge     HPI:   Patient sitting up on the chair stating she is ready to be discharged home and that cardiology has already seen her.  Denies chest pain, vomiting, shortness of air, diaphoresis but states she has some nausea.  Discussed taking meds after she has eaten to help with the nausea.    Vital Signs:   Temp:  [97.9 °F (36.6 °C)-98.3 °F (36.8 °C)] 98.2 °F (36.8 °C)  Heart Rate:  [59-92] 90  Resp:  [16] 16  BP: (105-150)/(63-76) 147/65    Physical Exam:  Constitutional: Alert, WD, WN male sitting up in chair in NAD  ENT: Pink, moist mucous membranes   Respiratory: Nonlabored, symmetrical chest expansion, CTAB, RA  Cardiovascular: RRR, no M/R/G  Gastrointestinal: Soft, NT, ND +BS  Musculoskeletal: MALONE; no LE edema bilaterally  Neurologic: Oriented x4, equal strength, follows all commands, speech clear  Skin: No rashes on exposed skin  Psychiatric: Pleasant and cooperative; normal affect      Pertinent  and/or Most Recent Results     LAB RESULTS:      Lab 08/11/24  0743 08/11/24  0011 08/10/24  1556 08/10/24  0413 08/09/24  1154  08/09/24  0350 08/08/24 2038 08/08/24 2018   WBC  --   --   --  4.00  --  5.24  --  6.14   HEMOGLOBIN 11.2* 10.6* 10.7* 10.6*  --  12.0  --  12.0   HEMATOCRIT 35.0 32.8* 33.4* 33.6*  --  37.7  --  37.3   PLATELETS  --   --   --  99*  --  138*  --  139*   NEUTROS ABS  --   --   --   --   --  2.55  --  2.79   IMMATURE GRANS (ABS)  --   --   --   --   --  0.02  --  0.02   LYMPHS ABS  --   --   --   --   --  2.03  --  2.64   MONOS ABS  --   --   --   --   --  0.51  --  0.55   EOS ABS  --   --   --   --   --  0.10  --  0.11   MCV  --   --   --  82.0  --  79.7  --  79.4   PROTIME  --   --   --   --   --   --  14.7*  --    APTT  --   --   --   --   --   --  30.8*  --    HEPARIN ANTI-XA  --   --   --   --  0.30 0.14* 0.10*  --    D DIMER QUANT  --   --   --   --   --   --  <0.27  --          Lab 08/11/24  0743 08/10/24  1246 08/10/24  0413 08/09/24 0350 08/08/24 2018   SODIUM 142  --  139 143 138   POTASSIUM 4.4  --  4.6 3.8 3.9   CHLORIDE 108*  --  107 107 104   CO2 26.0  --  25.0 29.0 23.0   ANION GAP 8.0  --  7.0 7.0 11.0   BUN 12  --  16 18 21*   CREATININE 0.80  --  0.74 0.83 0.70   EGFR 86.1  --  94.5 82.3 101.0   GLUCOSE 83  --  111* 90 79   CALCIUM 8.7  --  8.7 9.0 8.8   MAGNESIUM 1.6  --   --   --  2.1   HEMOGLOBIN A1C  --  4.90  --  5.00  --          Lab 08/09/24  0350 08/08/24 2018   TOTAL PROTEIN 6.9 7.1   ALBUMIN 3.9 4.2   GLOBULIN 3.0 2.9   ALT (SGPT) 13 13   AST (SGOT) 18 17   BILIRUBIN 0.4 0.6   ALK PHOS 94 91         Lab 08/08/24  2226 08/08/24 2038 08/08/24 2018   PROBNP  --   --  <36.0   HSTROP T <6  --  <6   PROTIME  --  14.7*  --    INR  --  1.14*  --          Lab 08/10/24  0413 08/09/24  0350   CHOLESTEROL 115 141   LDL CHOL 52 72   HDL CHOL 36* 40   TRIGLYCERIDES 161* 169*             Brief Urine Lab Results       None          Microbiology Results (last 10 days)       ** No results found for the last 240 hours. **            Cardiac Catheterization/Vascular Study    Result Date: 8/9/2024     Occluded RCA with widely patent SVG to right PDA.  Supplies collaterals to distal LAD and OM 2 as well.   Occluded obtuse marginal with occluded vein graft (chronic).   Occluded proximal LAD.  Patent LIMA to the mid LAD, however no significant flow due to proximal and distal anastomotic subtotal occlusions.   First diagonal branch with 70% heavily calcified tortuous artery stenosis.   LVEF 70%                  Plan for Follow-up of Pending Labs/Results:     Discharge Details        Discharge Medications        New Medications        Instructions Start Date   acetaminophen 325 MG tablet  Commonly known as: TYLENOL   650 mg, Oral, Every 4 Hours PRN      ondansetron ODT 4 MG disintegrating tablet  Commonly known as: ZOFRAN-ODT   4 mg, Translingual, Every 8 Hours PRN             Changes to Medications        Instructions Start Date   carvedilol 12.5 MG tablet  Commonly known as: COREG  What changed: when to take this   12.5 mg, Oral, Every 12 Hours Scheduled      omeprazole 20 MG capsule  Commonly known as: priLOSEC  What changed: Another medication with the same name was removed. Continue taking this medication, and follow the directions you see here.   No dose, route, or frequency recorded.      ranolazine 1000 MG 12 hr tablet  Commonly known as: RANEXA  What changed:   medication strength  how much to take   1,000 mg, Oral, 2 Times Daily             Continue These Medications        Instructions Start Date   ALPRAZolam 2 MG tablet  Commonly known as: XANAX   2 mg, Oral, Nightly PRN      aspirin 81 MG EC tablet   81 mg, Oral, Daily      atorvastatin 40 MG tablet  Commonly known as: LIPITOR   40 mg, Oral, Daily      Brilinta 90 MG tablet tablet  Generic drug: ticagrelor   90 mg, Oral, Daily      cetirizine 10 MG tablet  Commonly known as: zyrTEC   No dose, route, or frequency recorded.      Desvenlafaxine  MG tablet sustained-release 24 hour       fluticasone 50 MCG/ACT nasal spray  Commonly known as: FLONASE    No dose, route, or frequency recorded.      freestyle lancets   No dose, route, or frequency recorded.      FREESTYLE LITE test strip  Generic drug: glucose blood   No dose, route, or frequency recorded.      isosorbide mononitrate 60 MG 24 hr tablet  Commonly known as: IMDUR   90 mg, Oral, Daily, For chest pain.       nitroglycerin 0.4 MG SL tablet  Commonly known as: NITROSTAT   0.4 mg, Sublingual, Every 5 Minutes PRN, Take no more than 3 doses in 15 minutes.       rOPINIRole 2 MG tablet  Commonly known as: REQUIP   2 mg, Oral, Nightly, Restless leg.       zolpidem 5 MG tablet  Commonly known as: AMBIEN   5 mg, Oral, Nightly PRN             Stop These Medications      FLUoxetine 40 MG capsule  Commonly known as: PROzac     ibuprofen 800 MG tablet  Commonly known as: ADVIL,MOTRIN     lisinopril 5 MG tablet  Commonly known as: PRINIVIL,ZESTRIL     loratadine 10 MG tablet  Commonly known as: CLARITIN     metFORMIN 1000 MG tablet  Commonly known as: GLUCOPHAGE     metoprolol tartrate 100 MG tablet  Commonly known as: LOPRESSOR     mirtazapine 15 MG tablet  Commonly known as: REMERON     montelukast 10 MG tablet  Commonly known as: SINGULAIR              No Known Allergies      Discharge Disposition:  Home or Self Care    Diet:  Hospital:  Diet Order   Procedures    Diet: Cardiac; Healthy Heart (2-3 Na+); Fluid Consistency: Thin (IDDSI 0)       Diet Instructions       Diet: Cardiac Diets; Healthy Heart (2-3 Na+); Regular (IDDSI 7); Thin (IDDSI 0)      Discharge Diet: Cardiac Diets    Cardiac Diet: Healthy Heart (2-3 Na+)    Texture: Regular (IDDSI 7)    Fluid Consistency: Thin (IDDSI 0)             Activity:  Activity Instructions       Activity as Tolerated      Work Restrictions      Type of Restriction: Work    May Return to Work: Immediately    With / Without Restrictions: With Restrictions    Explain Work Restrictions: Work Shift Monday through Wednesday then return to regular schedule            Restrictions or  Other Recommendations:  Follow work excuse instructions       CODE STATUS:    Code Status and Medical Interventions: CPR (Attempt to Resuscitate); Full Support   Ordered at: 08/08/24 2049     Level Of Support Discussed With:    Patient     Code Status (Patient has no pulse and is not breathing):    CPR (Attempt to Resuscitate)     Medical Interventions (Patient has pulse or is breathing):    Full Support         Additional Instructions for the Follow-ups that You Need to Schedule       Discharge Follow-up with PCP   As directed       Currently Documented PCP:    Anna Pulido APRN    PCP Phone Number:    [phone]     Follow Up Details: Follow-up with your family doctor in 1 week; call the office Monday to schedule an appointment        Discharge Follow-up with Specified Provider: Follow-up with Dr. Smith in 4 weeks; call the office Monday to schedule an appointment   As directed      To: Follow-up with Dr. Smith in 4 weeks; call the office Monday to schedule an appointment                      AYAZ Burns  08/11/24      Time Spent on Discharge:  I spent  45  minutes on this discharge activity which included: face-to-face encounter with the patient, reviewing the data in the system, coordination of the care with the nursing staff as well as consultants, documentation, and entering orders.

## 2024-08-11 NOTE — PROGRESS NOTES
"  [facility]  PROGRESS NOTE    Date of Admission: 8/8/2024  Date of Service: 08/11/24    Primary Care Physician: Anna Pulido APRN    Chief Complaint: Unstable angina     Problem List:   CAD (coronary artery disease)    Hypertension    Hyperlipidemia    Sleep apnea    Diabetes    Fatty liver    RLS (restless legs syndrome)    Depression    GERD (gastroesophageal reflux disease)    Insomnia    Unstable angina      Subjective      No acute events overnight.  No chest pain or shortness of breath.  Feels like she can go home today.  Blood pressure 140s, heart rate in the 90s.  Has not been given a.m. medications      Objective   Vitals: /64 (BP Location: Right arm, Patient Position: Lying)   Pulse 59   Temp 97.9 °F (36.6 °C) (Oral)   Resp 16   Ht 167.6 cm (66\")   Wt 77 kg (169 lb 12.8 oz)   SpO2 97%   BMI 27.41 kg/m²     Physical Exam:  GENERAL:  in no acute distress.   HEART: Regular rhythm, normal rate, and no murmurs, gallops, or rubs.   LUNGS: Clear to auscultation bilaterally. No wheezing, rales or rhonchi.  EXTREMITIES: No edema noted.     Results:  Results from last 7 days   Lab Units 08/11/24  0011 08/10/24  1556 08/10/24  0413 08/09/24  0350 08/08/24 2018   WBC 10*3/mm3  --   --  4.00 5.24 6.14   HEMOGLOBIN g/dL 10.6* 10.7* 10.6* 12.0 12.0   HEMATOCRIT % 32.8* 33.4* 33.6* 37.7 37.3   PLATELETS 10*3/mm3  --   --  99* 138* 139*     Results from last 7 days   Lab Units 08/10/24  0413 08/09/24  0350 08/08/24 2018   SODIUM mmol/L 139 143 138   POTASSIUM mmol/L 4.6 3.8 3.9   CHLORIDE mmol/L 107 107 104   CO2 mmol/L 25.0 29.0 23.0   BUN mg/dL 16 18 21*   CREATININE mg/dL 0.74 0.83 0.70   GLUCOSE mg/dL 111* 90 79      Lab Results   Component Value Date    CHOL 115 08/10/2024    TRIG 161 (H) 08/10/2024    HDL 36 (L) 08/10/2024    LDL 52 08/10/2024    AST 18 08/09/2024    ALT 13 08/09/2024     Results from last 7 days   Lab Units 08/10/24  1246   HEMOGLOBIN " A1C % 4.90     Results from last 7 days   Lab Units 08/10/24  0413   CHOLESTEROL mg/dL 115   TRIGLYCERIDES mg/dL 161*   HDL CHOL mg/dL 36*   LDL CHOL mg/dL 52             Results from last 7 days   Lab Units 08/08/24  2038   PROTIME Seconds 14.7*   INR  1.14*   APTT seconds 30.8*     Results from last 7 days   Lab Units 08/08/24 2226 08/08/24 2018   HSTROP T ng/L <6 <6     Results from last 7 days   Lab Units 08/08/24 2018   PROBNP pg/mL <36.0       No intake or output data in the 24 hours ending 08/11/24 0708    I personally reviewed the patient's EKG/Telemetry data      Current Medications:  aspirin, 81 mg, Oral, Daily  atorvastatin, 40 mg, Oral, Daily  carvedilol, 12.5 mg, Oral, Q12H  cetirizine, 10 mg, Oral, Daily  fluticasone, 2 spray, Each Nare, Daily  isosorbide mononitrate, 90 mg, Oral, Daily  pantoprazole, 40 mg, Oral, Q AM  ranolazine, 1,000 mg, Oral, BID  sodium chloride, 10 mL, Intravenous, Q12H  ticagrelor, 90 mg, Oral, Q12H      sodium chloride, 100 mL/hr, Last Rate: 100 mL/hr (08/10/24 1816)        Assessment:  CAD  Unstable angina  Left heart catheterization 8/9/2024 patent SVG to the PDA with collaterals to the distal LAD and OM 2 patent LIMA to the LAD, 70% D1 heavily calcified.  EF 70%.  Medical management recommended.  Hypertension  Dyslipidemia, stable.  LDL 72.  Diabetes, on metformin as an outpatient.        Plan  Continue aspirin, Brilinta, and statin for CAD.  Continue Ranexa 1000 mg twice daily and Imdur 90 mg daily for antianginal therapy.  Continue carvedilol 12.5 mg twice daily.  Monitor blood pressure at home and call our office with any issues of hypotension.  Follow-up with Dr. Smith in 4 weeks    Katelyn Perez PA-C    Seen independently by VARSHA Espinosa MD, [facility]

## 2024-08-12 ENCOUNTER — READMISSION MANAGEMENT (OUTPATIENT)
Dept: CALL CENTER | Facility: HOSPITAL | Age: 59
End: 2024-08-12
Payer: COMMERCIAL

## 2024-08-12 ENCOUNTER — DOCUMENTATION (OUTPATIENT)
Dept: CARDIAC REHAB | Facility: HOSPITAL | Age: 59
End: 2024-08-12
Payer: COMMERCIAL

## 2024-08-13 ENCOUNTER — TELEPHONE (OUTPATIENT)
Dept: CARDIOLOGY | Facility: CLINIC | Age: 59
End: 2024-08-13
Payer: COMMERCIAL

## 2024-08-14 ENCOUNTER — READMISSION MANAGEMENT (OUTPATIENT)
Dept: CALL CENTER | Facility: HOSPITAL | Age: 59
End: 2024-08-14
Payer: COMMERCIAL

## 2024-08-14 NOTE — OUTREACH NOTE
Medical Week 1 Survey      Flowsheet Row Responses   [facility] patient discharged from? [place]   Does the patient have one of the following disease processes/diagnoses(primary or secondary)? Other   Week 1 attempt successful? No   Unsuccessful attempts Attempt 1            Jana VENTURA - Registered Nurse

## 2024-08-21 ENCOUNTER — READMISSION MANAGEMENT (OUTPATIENT)
Dept: CALL CENTER | Facility: HOSPITAL | Age: 59
End: 2024-08-21
Payer: COMMERCIAL

## 2024-08-21 NOTE — OUTREACH NOTE
Medical Week 2 Survey      Flowsheet Row Responses   [facility] patient discharged from? [place]   Does the patient have one of the following disease processes/diagnoses(primary or secondary)? Other   Week 2 attempt successful? No   Unsuccessful attempts Attempt 1            Steven GONZALES - Registered Nurse

## 2024-08-28 ENCOUNTER — READMISSION MANAGEMENT (OUTPATIENT)
Dept: CALL CENTER | Facility: HOSPITAL | Age: 59
End: 2024-08-28
Payer: COMMERCIAL

## 2024-08-28 NOTE — OUTREACH NOTE
Medical Week 3 Survey      Flowsheet Row Responses   [facility] patient discharged from? [place]   Does the patient have one of the following disease processes/diagnoses(primary or secondary)? Other   Week 3 attempt successful? Yes   Call start time 0948   Call end time 0950   Discharge diagnosis Unstable angina,Hypotension,Hypomagnesemia   Is the patient taking all medications as directed (includes completed medication regime)? Yes   Does the patient have a primary care provider?  Yes   Has the patient kept scheduled appointments due by today? Yes   Psychosocial issues? No   What is the patient's perception of their health status since discharge? Improving   Is the patient/caregiver able to teach back signs and symptoms related to disease process for when to call PCP? Yes   Is the patient/caregiver able to teach back signs and symptoms related to disease process for when to call 911? Yes   Is the patient/caregiver able to teach back the hierarchy of who to call/visit for symptoms/problems? PCP, Specialist, Home health nurse, Urgent Care, ED, 911 Yes   Additional teach back comments States she is doing well.  Has been to f/u appts and taking meds.   Week 3 Call Completed? Yes   Graduated Yes   Graduated/Revoked comments No questions or needs at this time.   Call end time 0950            Lorrie SCALES - Licensed Nurse

## (undated) DEVICE — INTENDED USE FOR SURGICAL MARKING ON INTACT SKIN, ALSO PROVIDES A PERMANENT METHOD OF IDENTIFYING OBJECTS IN THE OPERATING ROOM: Brand: WRITESITE® REGULAR TIP SKIN MARKER

## (undated) DEVICE — PK BARIATRIC 10

## (undated) DEVICE — CATH DIAG EXPO M/ PK 6FR FL4/FR4 PIG 3PK

## (undated) DEVICE — ST EXT IV SMRTSTE 2VLV FIX M LL 6ML 41

## (undated) DEVICE — TROCAR: Brand: KII FIOS FIRST ENTRY

## (undated) DEVICE — Device: Brand: DEFENDO AIR/WATER/SUCTION AND BIOPSY VALVE

## (undated) DEVICE — CATH DIAG EXPO .056 FL3.5 6F 100CM

## (undated) DEVICE — APPL CHLORAPREP TINTED 26ML TEAL

## (undated) DEVICE — GLIDESHEATH BASIC HYDROPHILIC COATED INTRODUCER SHEATH: Brand: GLIDESHEATH

## (undated) DEVICE — MODEL AT P65, P/N 701554-001KIT CONTENTS: HAND CONTROLLER, 3-WAY HIGH-PRESSURE STOPCOCK WITH ROTATING END AND PREMIUM HIGH-PRESSURE TUBING: Brand: ANGIOTOUCH® KIT

## (undated) DEVICE — CVR HNDL LIGHT RIGID

## (undated) DEVICE — DRSNG TELFA PAD NONADH STR 1S 3X8IN

## (undated) DEVICE — GLV SURG SENSICARE PI MIC PF SZ8.5 LF STRL

## (undated) DEVICE — NDL ANGIOGR ADV THN SMOTH SGLWALL 21G 1.5

## (undated) DEVICE — Device

## (undated) DEVICE — APL DUPLOSPRAYER MIS 40CM

## (undated) DEVICE — ENDOPATH 5MM ENDOSCOPIC BLUNT TIP DISSECTORS (12 POUCHES CONTAINING 3 DISSECTORS EACH): Brand: ENDOPATH

## (undated) DEVICE — CONTN GRAD MEAS TRIANG 32OZ BLK

## (undated) DEVICE — ENDOPATH XCEL BLADELESS TROCARS WITH STABILITY SLEEVES: Brand: ENDOPATH XCEL

## (undated) DEVICE — [HIGH FLOW INSUFFLATOR,  DO NOT USE IF PACKAGE IS DAMAGED,  KEEP DRY,  KEEP AWAY FROM SUNLIGHT,  PROTECT FROM HEAT AND RADIOACTIVE SOURCES.]: Brand: PNEUMOSURE

## (undated) DEVICE — ST INF PRI SMRTSTE 20DRP 2VLV 24ML 117

## (undated) DEVICE — UNDRPD COMFRT GLD DRYPAD 36X57IN

## (undated) DEVICE — MAX-CORE® DISPOSABLE CORE BIOPSY INSTRUMENT, 18G X 20CM: Brand: MAX-CORE

## (undated) DEVICE — CLMP STD 22CM DISP

## (undated) DEVICE — PK CATH CARD 10

## (undated) DEVICE — GW PERIPH GUIDERIGHT STD/EXCHNG/J/TIP SS 0.035IN 5X260CM

## (undated) DEVICE — SHT AIR TRANSFR COMFRT GLIDE LAT 40X80IN

## (undated) DEVICE — POWER SHELL: Brand: SIGNIA

## (undated) DEVICE — FLTR PLUMEPORT LAP W/CONN STRL

## (undated) DEVICE — MARYLAND JAW LAPAROSCOPIC SEALER/DIVIDER COATED: Brand: LIGASURE

## (undated) DEVICE — MODEL BT2000 P/N 700287-012KIT CONTENTS: MANIFOLD WITH SALINE AND CONTRAST PORTS, SALINE TUBING WITH SPIKE AND HAND SYRINGE, TRANSDUCER: Brand: BT2000 AUTOMATED MANIFOLD KIT

## (undated) DEVICE — GOWN,NON-REINFORCED,SIRUS,SET IN SLV,XXL: Brand: MEDLINE